# Patient Record
Sex: MALE | Race: ASIAN | NOT HISPANIC OR LATINO | URBAN - METROPOLITAN AREA
[De-identification: names, ages, dates, MRNs, and addresses within clinical notes are randomized per-mention and may not be internally consistent; named-entity substitution may affect disease eponyms.]

---

## 2017-12-26 PROBLEM — Z00.00 ENCOUNTER FOR PREVENTIVE HEALTH EXAMINATION: Status: ACTIVE | Noted: 2017-12-26

## 2018-06-18 ENCOUNTER — INPATIENT (INPATIENT)
Facility: HOSPITAL | Age: 78
LOS: 3 days | Discharge: HOME | End: 2018-06-22
Attending: LEGAL MEDICINE | Admitting: LEGAL MEDICINE
Payer: MEDICARE

## 2018-06-18 VITALS
DIASTOLIC BLOOD PRESSURE: 77 MMHG | OXYGEN SATURATION: 98 % | RESPIRATION RATE: 18 BRPM | TEMPERATURE: 98 F | HEART RATE: 77 BPM | SYSTOLIC BLOOD PRESSURE: 148 MMHG

## 2018-06-18 DIAGNOSIS — I10 ESSENTIAL (PRIMARY) HYPERTENSION: ICD-10-CM

## 2018-06-18 DIAGNOSIS — E11.59 TYPE 2 DIABETES MELLITUS WITH OTHER CIRCULATORY COMPLICATIONS: ICD-10-CM

## 2018-06-18 DIAGNOSIS — N17.9 ACUTE KIDNEY FAILURE, UNSPECIFIED: ICD-10-CM

## 2018-06-18 DIAGNOSIS — E87.6 HYPOKALEMIA: ICD-10-CM

## 2018-06-18 DIAGNOSIS — I25.10 ATHEROSCLEROTIC HEART DISEASE OF NATIVE CORONARY ARTERY WITHOUT ANGINA PECTORIS: ICD-10-CM

## 2018-06-18 DIAGNOSIS — Z29.9 ENCOUNTER FOR PROPHYLACTIC MEASURES, UNSPECIFIED: ICD-10-CM

## 2018-06-18 DIAGNOSIS — M25.551 PAIN IN RIGHT HIP: ICD-10-CM

## 2018-06-18 DIAGNOSIS — E78.5 HYPERLIPIDEMIA, UNSPECIFIED: ICD-10-CM

## 2018-06-18 LAB
BASOPHILS # BLD AUTO: 0.03 K/UL — SIGNIFICANT CHANGE UP (ref 0–0.2)
BASOPHILS NFR BLD AUTO: 0.4 % — SIGNIFICANT CHANGE UP (ref 0–2)
BUN SERPL-MCNC: 16 MG/DL — SIGNIFICANT CHANGE UP (ref 7–23)
CALCIUM SERPL-MCNC: 8.5 MG/DL — SIGNIFICANT CHANGE UP (ref 8.4–10.5)
CHLORIDE SERPL-SCNC: 98 MMOL/L — SIGNIFICANT CHANGE UP (ref 98–107)
CO2 SERPL-SCNC: 27 MMOL/L — SIGNIFICANT CHANGE UP (ref 22–31)
CREAT SERPL-MCNC: 1.55 MG/DL — HIGH (ref 0.5–1.3)
EOSINOPHIL # BLD AUTO: 0.11 K/UL — SIGNIFICANT CHANGE UP (ref 0–0.5)
EOSINOPHIL NFR BLD AUTO: 1.3 % — SIGNIFICANT CHANGE UP (ref 0–6)
GLUCOSE SERPL-MCNC: 185 MG/DL — HIGH (ref 70–99)
HCT VFR BLD CALC: 46.4 % — SIGNIFICANT CHANGE UP (ref 39–50)
HGB BLD-MCNC: 16.1 G/DL — SIGNIFICANT CHANGE UP (ref 13–17)
IMM GRANULOCYTES # BLD AUTO: 0.03 # — SIGNIFICANT CHANGE UP
IMM GRANULOCYTES NFR BLD AUTO: 0.4 % — SIGNIFICANT CHANGE UP (ref 0–1.5)
LYMPHOCYTES # BLD AUTO: 1.39 K/UL — SIGNIFICANT CHANGE UP (ref 1–3.3)
LYMPHOCYTES # BLD AUTO: 16.3 % — SIGNIFICANT CHANGE UP (ref 13–44)
MCHC RBC-ENTMCNC: 29.6 PG — SIGNIFICANT CHANGE UP (ref 27–34)
MCHC RBC-ENTMCNC: 34.7 % — SIGNIFICANT CHANGE UP (ref 32–36)
MCV RBC AUTO: 85.3 FL — SIGNIFICANT CHANGE UP (ref 80–100)
MONOCYTES # BLD AUTO: 0.98 K/UL — HIGH (ref 0–0.9)
MONOCYTES NFR BLD AUTO: 11.5 % — SIGNIFICANT CHANGE UP (ref 2–14)
NEUTROPHILS # BLD AUTO: 6 K/UL — SIGNIFICANT CHANGE UP (ref 1.8–7.4)
NEUTROPHILS NFR BLD AUTO: 70.1 % — SIGNIFICANT CHANGE UP (ref 43–77)
NRBC # FLD: 0 — SIGNIFICANT CHANGE UP
PLATELET # BLD AUTO: 227 K/UL — SIGNIFICANT CHANGE UP (ref 150–400)
PMV BLD: 10.9 FL — SIGNIFICANT CHANGE UP (ref 7–13)
POTASSIUM SERPL-MCNC: 3.3 MMOL/L — LOW (ref 3.5–5.3)
POTASSIUM SERPL-SCNC: 3.3 MMOL/L — LOW (ref 3.5–5.3)
RBC # BLD: 5.44 M/UL — SIGNIFICANT CHANGE UP (ref 4.2–5.8)
RBC # FLD: 12.6 % — SIGNIFICANT CHANGE UP (ref 10.3–14.5)
SODIUM SERPL-SCNC: 139 MMOL/L — SIGNIFICANT CHANGE UP (ref 135–145)
WBC # BLD: 8.54 K/UL — SIGNIFICANT CHANGE UP (ref 3.8–10.5)
WBC # FLD AUTO: 8.54 K/UL — SIGNIFICANT CHANGE UP (ref 3.8–10.5)

## 2018-06-18 PROCEDURE — 99223 1ST HOSP IP/OBS HIGH 75: CPT

## 2018-06-18 PROCEDURE — 73522 X-RAY EXAM HIPS BI 3-4 VIEWS: CPT | Mod: 26

## 2018-06-18 RX ORDER — AMLODIPINE BESYLATE 2.5 MG/1
10 TABLET ORAL DAILY
Qty: 0 | Refills: 0 | Status: DISCONTINUED | OUTPATIENT
Start: 2018-06-18 | End: 2018-06-22

## 2018-06-18 RX ORDER — LOSARTAN POTASSIUM 100 MG/1
100 TABLET, FILM COATED ORAL DAILY
Qty: 0 | Refills: 0 | Status: DISCONTINUED | OUTPATIENT
Start: 2018-06-18 | End: 2018-06-22

## 2018-06-18 RX ORDER — LIDOCAINE 4 G/100G
1 CREAM TOPICAL ONCE
Qty: 0 | Refills: 0 | Status: COMPLETED | OUTPATIENT
Start: 2018-06-18 | End: 2018-06-18

## 2018-06-18 RX ORDER — ACETAMINOPHEN 500 MG
650 TABLET ORAL ONCE
Qty: 0 | Refills: 0 | Status: COMPLETED | OUTPATIENT
Start: 2018-06-18 | End: 2018-06-18

## 2018-06-18 RX ORDER — DEXTROSE 50 % IN WATER 50 %
15 SYRINGE (ML) INTRAVENOUS ONCE
Qty: 0 | Refills: 0 | Status: DISCONTINUED | OUTPATIENT
Start: 2018-06-18 | End: 2018-06-22

## 2018-06-18 RX ORDER — LOSARTAN POTASSIUM 100 MG/1
100 TABLET, FILM COATED ORAL DAILY
Qty: 0 | Refills: 0 | Status: DISCONTINUED | OUTPATIENT
Start: 2018-06-18 | End: 2018-06-18

## 2018-06-18 RX ORDER — DEXTROSE 50 % IN WATER 50 %
25 SYRINGE (ML) INTRAVENOUS ONCE
Qty: 0 | Refills: 0 | Status: DISCONTINUED | OUTPATIENT
Start: 2018-06-18 | End: 2018-06-22

## 2018-06-18 RX ORDER — METOPROLOL TARTRATE 50 MG
50 TABLET ORAL DAILY
Qty: 0 | Refills: 0 | Status: DISCONTINUED | OUTPATIENT
Start: 2018-06-18 | End: 2018-06-22

## 2018-06-18 RX ORDER — METOPROLOL TARTRATE 50 MG
50 TABLET ORAL DAILY
Qty: 0 | Refills: 0 | Status: DISCONTINUED | OUTPATIENT
Start: 2018-06-18 | End: 2018-06-18

## 2018-06-18 RX ORDER — POTASSIUM CHLORIDE 20 MEQ
40 PACKET (EA) ORAL ONCE
Qty: 0 | Refills: 0 | Status: COMPLETED | OUTPATIENT
Start: 2018-06-18 | End: 2018-06-18

## 2018-06-18 RX ORDER — ACETAMINOPHEN 500 MG
650 TABLET ORAL EVERY 6 HOURS
Qty: 0 | Refills: 0 | Status: DISCONTINUED | OUTPATIENT
Start: 2018-06-18 | End: 2018-06-22

## 2018-06-18 RX ORDER — GLUCAGON INJECTION, SOLUTION 0.5 MG/.1ML
1 INJECTION, SOLUTION SUBCUTANEOUS ONCE
Qty: 0 | Refills: 0 | Status: DISCONTINUED | OUTPATIENT
Start: 2018-06-18 | End: 2018-06-22

## 2018-06-18 RX ORDER — ASPIRIN/CALCIUM CARB/MAGNESIUM 324 MG
81 TABLET ORAL DAILY
Qty: 0 | Refills: 0 | Status: DISCONTINUED | OUTPATIENT
Start: 2018-06-18 | End: 2018-06-22

## 2018-06-18 RX ORDER — SODIUM CHLORIDE 9 MG/ML
1000 INJECTION, SOLUTION INTRAVENOUS
Qty: 0 | Refills: 0 | Status: DISCONTINUED | OUTPATIENT
Start: 2018-06-18 | End: 2018-06-22

## 2018-06-18 RX ORDER — TRAMADOL HYDROCHLORIDE 50 MG/1
25 TABLET ORAL EVERY 8 HOURS
Qty: 0 | Refills: 0 | Status: DISCONTINUED | OUTPATIENT
Start: 2018-06-18 | End: 2018-06-22

## 2018-06-18 RX ORDER — DEXTROSE 50 % IN WATER 50 %
12.5 SYRINGE (ML) INTRAVENOUS ONCE
Qty: 0 | Refills: 0 | Status: DISCONTINUED | OUTPATIENT
Start: 2018-06-18 | End: 2018-06-22

## 2018-06-18 RX ORDER — GABAPENTIN 400 MG/1
100 CAPSULE ORAL EVERY 8 HOURS
Qty: 0 | Refills: 0 | Status: DISCONTINUED | OUTPATIENT
Start: 2018-06-18 | End: 2018-06-22

## 2018-06-18 RX ORDER — INSULIN LISPRO 100/ML
VIAL (ML) SUBCUTANEOUS
Qty: 0 | Refills: 0 | Status: DISCONTINUED | OUTPATIENT
Start: 2018-06-18 | End: 2018-06-22

## 2018-06-18 RX ORDER — ENOXAPARIN SODIUM 100 MG/ML
40 INJECTION SUBCUTANEOUS EVERY 24 HOURS
Qty: 0 | Refills: 0 | Status: DISCONTINUED | OUTPATIENT
Start: 2018-06-18 | End: 2018-06-22

## 2018-06-18 RX ADMIN — LIDOCAINE 1 PATCH: 4 CREAM TOPICAL at 22:39

## 2018-06-18 RX ADMIN — Medication 650 MILLIGRAM(S): at 09:30

## 2018-06-18 RX ADMIN — GABAPENTIN 100 MILLIGRAM(S): 400 CAPSULE ORAL at 22:36

## 2018-06-18 RX ADMIN — LIDOCAINE 1 PATCH: 4 CREAM TOPICAL at 09:30

## 2018-06-18 RX ADMIN — Medication 40 MILLIEQUIVALENT(S): at 13:33

## 2018-06-18 RX ADMIN — LIDOCAINE 1 PATCH: 4 CREAM TOPICAL at 09:49

## 2018-06-18 RX ADMIN — Medication 1: at 22:36

## 2018-06-18 NOTE — ED PROVIDER NOTE - ATTENDING CONTRIBUTION TO CARE
78 yr old male with hx of HTn, HLD, DM and cad with stent presents to ed c/o right posterior gluteal and hip pain chronically but worsening past 4 wks after having a stress test.  pt 2wks ago using walker to ambulate then past 4 days pain worsen to point where bearing weigh causes pain fro posterior hip down to leg.  no incontinence, no pain or discomfort when resting, no trauma, no injection to site, no fever, no chills, no rashes, no abd pain, no n/v, no dysuria.  feels weak on right hip.     *GEN:   comfortable, in no apparent distress, AOx3  *EYES:   PERRL, extra-occular movements intact  *HEENT:   airway patent, moist mucosal membranes, uvula midline  *CV:   regular rate and rhythm, normal S1/S2, no murmur, bilateral equal femoral pulses no mass or thrill.  2+ bilateral DP, equal temp  *RESP:   clear to auscultation bilaterally, non-labored, speaking in full sentences  *ABD:   soft, non tender, no guarding  *:   no cva tenderness  *MSK:   no musculoskeletal tenderness, 5/5 strength except at RLE +4/5 at rest, moving all extremity, when ambulating visible right foot drag, no point tenderness,   *SKIN:   dry, intact, no rash  *NEURO:   AOx3, no focal weakness or loss of sensation, GCS 15    pt with RLE weakness without any neurovascular compromise very likely strain vs partial tear vs piriformis syndrome vs deconditioning.  xr of hip/pelvis, lidoderm, f/u with ortho

## 2018-06-18 NOTE — H&P ADULT - NSHPPHYSICALEXAM_GEN_ALL_CORE
Vital Signs Last 24 Hrs  T(C): 36.7 (18 Jun 2018 14:10), Max: 36.8 (18 Jun 2018 08:44)  T(F): 98 (18 Jun 2018 14:10), Max: 98.3 (18 Jun 2018 08:44)  HR: 72 (18 Jun 2018 14:10) (72 - 77)  BP: 148/86 (18 Jun 2018 14:10) (148/77 - 148/86)  BP(mean): --  RR: 17 (18 Jun 2018 14:10) (17 - 18)  SpO2: 99% (18 Jun 2018 14:10) (98% - 99%)    GENERAL: NAD, well-developed  HEAD:  Atraumatic, Normocephalic  NECK: Supple, No JVD  CHEST/LUNG: Clear to auscultation bilaterally; No wheeze  HEART: Regular rate and rhythm; No murmurs, rubs, or gallops  ABDOMEN: Soft, Nontender, Nondistended; Bowel sounds present  EXTREMITIES:  2+ Peripheral Pulses, No clubbing, cyanosis, or edema  PSYCH: AAOx3  NEUROLOGY: Mild RLE proximal > distal weakness 4/5 compared to left  MSK: ROM in right hip mildly limited by pain   SKIN: No rashes or lesions

## 2018-06-18 NOTE — H&P ADULT - PROBLEM SELECTOR PLAN 3
S/p JOSH with recent stress test  - C/w ASA  - Hold statin for now in case of statin induced myalgias or myopathy; can re-start if no improvement off of statin   - C/w ARB and BB

## 2018-06-18 NOTE — H&P ADULT - NSHPLABSRESULTS_GEN_ALL_CORE
LABS:                      16.1   8.54  )-----------( 227      ( 18 Jun 2018 11:20 )             46.4     06-18    139  |  98  |  16  ----------------------------<  185<H>  3.3<L>   |  27  |  1.55<H>    Ca    8.5      18 Jun 2018 11:20    RADIOLOGY: Personally Reviewed  Right hip x-ray: No hip fracture or dislocation

## 2018-06-18 NOTE — H&P ADULT - PROBLEM SELECTOR PLAN 6
- Check A1C  - Hold oral hypoglycemic agents while hospitalized  - Monitor FS's on SS  - If FS's uncontrolled, will consider basal/bolus insulin

## 2018-06-18 NOTE — ED ADULT NURSE NOTE - OBJECTIVE STATEMENT
Received pt. in Intake 4 presenting to the ER complaining of inability to ambulate with pain in right hip and right leg. Patient have a history of DM, CAD with stent, High cholesterol, HTN. Patient stated " my right leg and hip have pain for 4 weeks and it is getting worse". Patient is alert and oriented x 4, vss, able to move right leg but unable to ambulate, labs sent will continue to monitor.

## 2018-06-18 NOTE — H&P ADULT - ASSESSMENT
78 yr old male with hx of HTN, HLD, DM 2 and CAD s/p JOSH who presents to ED who c/o chronic right posterior gluteal and hip pain worsening for the past 4 wks after having a stress test.

## 2018-06-18 NOTE — H&P ADULT - PMH
Diabetes mellitus type II    Essential hypertension  Hypertension  GERD (gastroesophageal reflux disease)    HTN - Hypertension    Hypercholesterolemia  Hypercholesteremia  Hyperlipidemia    Type 2 diabetes mellitus  Diabetes

## 2018-06-18 NOTE — H&P ADULT - HISTORY OF PRESENT ILLNESS
This is an  Patient was able to use walker to ambulate 2 weeks ago then in the past 4 days his pain worsened to the point where bearing weigh causes pain from posterior hip down to leg. He describes pain as aching, localized to hip and thighs, bilateral LE's, right worse than left, precipitated by doing treadmill stress test, better with rest, worse with ambulation, and associated with some right foot drop. He has no incontinence, no pain or discomfort when resting, no trauma, no injection to site, no fever, no chills, no rashes, no abd pain, no n/v, no dysuria. In the ED, patient HDS and afebrile. He was admitted to medicine for pain control and rehab evaluation.

## 2018-06-18 NOTE — H&P ADULT - PROBLEM SELECTOR PLAN 7
SCr 1.55; unlikely to be SUDHIR  - Would obtain outpatient records to confirm stability of SCr  - Can c/w ARB for now   - Trend SCr daily

## 2018-06-18 NOTE — ED PROVIDER NOTE - OBJECTIVE STATEMENT
78 yr old male with hx of HTn, HLD, DM and cad with stent presents to ed c/o right posterior gluteal and hip pain chronically but worsening past 4 wks after having a stress test.  pt 2wks ago using walker to ambulate then past 4 days pain worsen to point where bearing weigh causes pain fro posterior hip down to leg.  no incontinence, no pain or discomfort when resting, no trauma, no injection to site, no fever, no chills, no rashes, no abd pain, no n/v, no dysuria.  feels weak on right hip.

## 2018-06-18 NOTE — ED PROVIDER NOTE - PHYSICAL EXAMINATION
*GEN:   comfortable, in no apparent distress, AOx3  *EYES:   PERRL, extra-occular movements intact  *HEENT:   airway patent, moist mucosal membranes, uvula midline  *CV:   regular rate and rhythm, normal S1/S2, no murmur, bilateral equal femoral pulses no mass or thrill.  2+ bilateral DP, equal temp  *RESP:   clear to auscultation bilaterally, non-labored, speaking in full sentences  *ABD:   soft, non tender, no guarding  *:   no cva tenderness  *MSK:   no musculoskeletal tenderness, 5/5 strength except at RLE +4/5 at rest, moving all extremity, when ambulating visible right foot drag, no point tenderness,   *SKIN:   dry, intact, no rash  *NEURO:   AOx3, no focal weakness or loss of sensation, GCS 15

## 2018-06-18 NOTE — ED PROVIDER NOTE - MEDICAL DECISION MAKING DETAILS
pt with RLE weakness without any neurovascular compromise very likely strain vs partial tear vs piriformis syndrome vs deconditioning.  xr of hip/pelvis, lidoderm, f/u with ortho.

## 2018-06-18 NOTE — H&P ADULT - PROBLEM SELECTOR PLAN 1
R > L, appears to be acute on chronic with worsening after stress test 2 weeks ago. No clear injury noted on exam and without any signs of cord compression   - X-ray of right hip negative   - PMR consult placed; will await recs  - PT/OT  - Pain control: start gabapentin 100mg TID along with tramadol prn for pain  - Hold statin in case of statin induced myopathy although low suspicion   - Check CPK levels in AM  - Will hold off on MRI hip for now given patient clinically in no distress. Await PMR recs for further imaging R > L, appears to be acute on chronic with worsening after stress test 2 weeks ago. No clear injury noted on exam and without any signs of cord compression. DDx includes hip strain vs. neuropathy (less likely sciatica) vs. myopathy etc.  - X-ray of right hip negative   - PMR consult placed; will await recs  - PT/OT  - Pain control: start gabapentin 100mg TID along with tramadol prn for pain  - Hold statin in case of statin induced myopathy although low suspicion   - Check CPK levels in AM  - Will hold off on MRI hip for now given patient clinically in no distress. Await PMR recs for further imaging

## 2018-06-18 NOTE — H&P ADULT - FAMILY HISTORY
Father  Still living? Unknown  Family history of diabetes mellitus, Age at diagnosis: Age Unknown  Family history of cardiovascular disease, Age at diagnosis: Age Unknown     Mother  Still living? Unknown  Family history of diabetes mellitus, Age at diagnosis: Age Unknown  Family history of cardiovascular disease, Age at diagnosis: Age Unknown     Sibling  Still living? Unknown  Family history of diabetes mellitus, Age at diagnosis: Age Unknown  Family history of cardiovascular disease, Age at diagnosis: Age Unknown     Child  Still living? Unknown  Family history of diabetes mellitus, Age at diagnosis: Age Unknown  Family history of cardiovascular disease, Age at diagnosis: Age Unknown     Grandparent  Still living? Unknown  Family history of diabetes mellitus, Age at diagnosis: Age Unknown  Family history of cardiovascular disease, Age at diagnosis: Age Unknown     Aunt  Still living? Unknown  Family history of diabetes mellitus, Age at diagnosis: Age Unknown  Family history of cardiovascular disease, Age at diagnosis: Age Unknown

## 2018-06-18 NOTE — ED ADULT NURSE NOTE - CHPI ED SYMPTOMS NEG
no dizziness/no decreased eating/drinking/no fever/no chills/no weakness/no vomiting/no tingling/no numbness/no nausea

## 2018-06-19 LAB
BUN SERPL-MCNC: 15 MG/DL — SIGNIFICANT CHANGE UP (ref 7–23)
CALCIUM SERPL-MCNC: 8.6 MG/DL — SIGNIFICANT CHANGE UP (ref 8.4–10.5)
CHLORIDE SERPL-SCNC: 99 MMOL/L — SIGNIFICANT CHANGE UP (ref 98–107)
CHOLEST SERPL-MCNC: 198 MG/DL — SIGNIFICANT CHANGE UP (ref 120–199)
CK SERPL-CCNC: 130 U/L — SIGNIFICANT CHANGE UP (ref 30–200)
CO2 SERPL-SCNC: 29 MMOL/L — SIGNIFICANT CHANGE UP (ref 22–31)
CREAT SERPL-MCNC: 1.42 MG/DL — HIGH (ref 0.5–1.3)
GLUCOSE BLDC GLUCOMTR-MCNC: 101 MG/DL — HIGH (ref 70–99)
GLUCOSE BLDC GLUCOMTR-MCNC: 153 MG/DL — HIGH (ref 70–99)
GLUCOSE BLDC GLUCOMTR-MCNC: 163 MG/DL — HIGH (ref 70–99)
GLUCOSE SERPL-MCNC: 108 MG/DL — HIGH (ref 70–99)
HBA1C BLD-MCNC: 7.9 % — HIGH (ref 4–5.6)
HCT VFR BLD CALC: 51.4 % — HIGH (ref 39–50)
HDLC SERPL-MCNC: 42 MG/DL — SIGNIFICANT CHANGE UP (ref 35–55)
HGB BLD-MCNC: 16.9 G/DL — SIGNIFICANT CHANGE UP (ref 13–17)
LIPID PNL WITH DIRECT LDL SERPL: 135 MG/DL — SIGNIFICANT CHANGE UP
MCHC RBC-ENTMCNC: 29.7 PG — SIGNIFICANT CHANGE UP (ref 27–34)
MCHC RBC-ENTMCNC: 32.9 % — SIGNIFICANT CHANGE UP (ref 32–36)
MCV RBC AUTO: 89.3 FL — SIGNIFICANT CHANGE UP (ref 80–100)
NRBC # FLD: 0 — SIGNIFICANT CHANGE UP
PLATELET # BLD AUTO: 163 K/UL — SIGNIFICANT CHANGE UP (ref 150–400)
PMV BLD: 11.4 FL — SIGNIFICANT CHANGE UP (ref 7–13)
POTASSIUM SERPL-MCNC: 3.9 MMOL/L — SIGNIFICANT CHANGE UP (ref 3.5–5.3)
POTASSIUM SERPL-SCNC: 3.9 MMOL/L — SIGNIFICANT CHANGE UP (ref 3.5–5.3)
RBC # BLD: 5.69 M/UL — SIGNIFICANT CHANGE UP (ref 4.2–5.8)
RBC # FLD: 12.7 % — SIGNIFICANT CHANGE UP (ref 10.3–14.5)
SODIUM SERPL-SCNC: 141 MMOL/L — SIGNIFICANT CHANGE UP (ref 135–145)
TRIGL SERPL-MCNC: 165 MG/DL — HIGH (ref 10–149)
TSH SERPL-MCNC: 8.72 UIU/ML — HIGH (ref 0.27–4.2)
WBC # BLD: 8.65 K/UL — SIGNIFICANT CHANGE UP (ref 3.8–10.5)
WBC # FLD AUTO: 8.65 K/UL — SIGNIFICANT CHANGE UP (ref 3.8–10.5)

## 2018-06-19 PROCEDURE — 99222 1ST HOSP IP/OBS MODERATE 55: CPT | Mod: GC

## 2018-06-19 RX ADMIN — GABAPENTIN 100 MILLIGRAM(S): 400 CAPSULE ORAL at 21:54

## 2018-06-19 RX ADMIN — LOSARTAN POTASSIUM 100 MILLIGRAM(S): 100 TABLET, FILM COATED ORAL at 06:24

## 2018-06-19 RX ADMIN — GABAPENTIN 100 MILLIGRAM(S): 400 CAPSULE ORAL at 13:39

## 2018-06-19 RX ADMIN — AMLODIPINE BESYLATE 10 MILLIGRAM(S): 2.5 TABLET ORAL at 06:23

## 2018-06-19 RX ADMIN — GABAPENTIN 100 MILLIGRAM(S): 400 CAPSULE ORAL at 06:24

## 2018-06-19 RX ADMIN — Medication 81 MILLIGRAM(S): at 13:39

## 2018-06-19 RX ADMIN — Medication 50 MILLIGRAM(S): at 06:23

## 2018-06-19 RX ADMIN — Medication 1: at 12:16

## 2018-06-19 RX ADMIN — ENOXAPARIN SODIUM 40 MILLIGRAM(S): 100 INJECTION SUBCUTANEOUS at 06:23

## 2018-06-19 NOTE — PHYSICAL THERAPY INITIAL EVALUATION ADULT - GENERAL OBSERVATIONS, REHAB EVAL
Consult received, chart reviewed. Patient received supine in bed, NAD, family present. Patient agreed to Evaluation from Physical Therapist.

## 2018-06-19 NOTE — PHYSICAL THERAPY INITIAL EVALUATION ADULT - PERTINENT HX OF CURRENT PROBLEM, REHAB EVAL
Pt. admitted for right hip pain. Per documentation, right hip x-ray: No hip fracture or dislocation.

## 2018-06-19 NOTE — PHYSICAL THERAPY INITIAL EVALUATION ADULT - ADDITIONAL COMMENTS
Pt. reports owning DME of straight cane.     Pt. was left supine in bed post PT Evaluation, NAD, family present. RN aware of pt. participation in PT

## 2018-06-19 NOTE — PHYSICAL THERAPY INITIAL EVALUATION ADULT - CRITERIA FOR SKILLED THERAPEUTIC INTERVENTIONS
risk reduction/prevention/anticipated discharge recommendation/impairments found/anticipated equipment needs at discharge/rehab potential/predicted duration of therapy intervention/therapy frequency

## 2018-06-19 NOTE — CHART NOTE - NSCHARTNOTEFT_GEN_A_CORE
PM & R recs: MRI and Vascular consult :- this work-up can be done on an outpatient basis.:d/w Dr. Yasmani sequeira to work up as an outpt

## 2018-06-19 NOTE — CONSULT NOTE ADULT - SUBJECTIVE AND OBJECTIVE BOX
HPI:  This 79yo male was able to use walker (?cane) to ambulate 2 weeks ago; in the 4 days PTA 6/18/18 his pain worsened to the point where weight bearing causes pain from posterior hip down to leg. He describes pain as aching, localized to hip and thighs, bilateral LE's, right worse than left, precipitated by doing treadmill stress test, better with rest, worse with ambulation, and associated with some right foot drop. He has no incontinence, no pain or discomfort when resting, no trauma, no injection to site, no fever, no chills, no rashes, no abd pain, no n/v, no dysuria. In the ED, patient HDS and afebrile. He was admitted to medicine for pain control and rehab evaluation. (18 Jun 2018 15:58)    Interval: acute on chronic bilateral hip pain exacerbated by stress test several weeks ago. Not rushing to perform hip MRI. Statin on hold in case aetiology of pain is statin related myopathy. CK WNL.    XR hips bilateral 6/18/18:  IMPRESSION:  No acute bone or joint disease.    REVIEW OF SYSTEMS: No chest pain, shortness of breath, nausea, vomiting or diarrhea      PAST MEDICAL & SURGICAL HISTORY  Essential hypertension  Type 2 diabetes mellitus  Hypercholesterolemia  GERD (gastroesophageal reflux disease)  Hyperlipidemia  Other postprocedural status  H/O colonoscopy    SOCIAL HISTORY  Smoking - Denied, EtOH - Denied, Drugs - Denied    FUNCTIONAL HISTORY:   Lives in a house with wife and daughter, 2 TIARA   Independent PTA with AD (straight cane)    CURRENT FUNCTIONAL STATUS:  bed mobility independent  transfers independent  gait CGA/supervision 50' RW    FAMILY HISTORY   Family history of cardiovascular disease (Father, Mother, Sibling, Child, Grandparent, Aunt)  Family history of diabetes mellitus (Father, Mother, Sibling, Child, Grandparent, Aunt)    RECENT LABS/IMAGING  CBC Full  -  ( 19 Jun 2018 05:10 )  WBC Count : 8.65 K/uL  Hemoglobin : 16.9 g/dL  Hematocrit : 51.4 %  Platelet Count - Automated : 163 K/uL  Mean Cell Volume : 89.3 fL  Mean Cell Hemoglobin : 29.7 pg  Mean Cell Hemoglobin Concentration : 32.9 %  Auto Neutrophil # : x  Auto Lymphocyte # : x  Auto Monocyte # : x  Auto Eosinophil # : x  Auto Basophil # : x  Auto Neutrophil % : x  Auto Lymphocyte % : x  Auto Monocyte % : x  Auto Eosinophil % : x  Auto Basophil % : x    06-19    141  |  99  |  15  ----------------------------<  108<H>  3.9   |  29  |  1.42<H>    Ca    8.6      19 Jun 2018 05:10    VITALS  T(C): 36.4 (06-19-18 @ 05:19), Max: 36.8 (06-18-18 @ 21:35)  HR: 69 (06-19-18 @ 05:19) (63 - 72)  BP: 149/84 (06-19-18 @ 05:19) (146/74 - 152/79)  RR: 18 (06-19-18 @ 05:19) (17 - 18)  SpO2: 98% (06-19-18 @ 05:19) (95% - 99%)  Wt(kg): --    ALLERGIES  No Known Allergies    MEDICATIONS   acetaminophen   Tablet. 650 milliGRAM(s) Oral every 6 hours PRN  amLODIPine   Tablet 10 milliGRAM(s) Oral daily  aspirin enteric coated 81 milliGRAM(s) Oral daily  dextrose 40% Gel 15 Gram(s) Oral once PRN  dextrose 5%. 1000 milliLiter(s) IV Continuous <Continuous>  dextrose 50% Injectable 12.5 Gram(s) IV Push once  dextrose 50% Injectable 25 Gram(s) IV Push once  dextrose 50% Injectable 25 Gram(s) IV Push once  enoxaparin Injectable 40 milliGRAM(s) SubCutaneous every 24 hours  gabapentin 100 milliGRAM(s) Oral every 8 hours  glucagon  Injectable 1 milliGRAM(s) IntraMuscular once PRN  insulin lispro (HumaLOG) corrective regimen sliding scale   SubCutaneous three times a day before meals  losartan 100 milliGRAM(s) Oral daily  metoprolol succinate ER 50 milliGRAM(s) Oral daily  traMADol 25 milliGRAM(s) Oral every 8 hours PRN    ----------------------------------------------------------------------------------------  PHYSICAL EXAM  Constitutional - NAD, Comfortable  HEENT - NCAT, EOMI  Neck - Supple, No limited ROM  Chest - CTA bilaterally, No wheeze, No rhonchi, No crackles  Cardiovascular - RRR, S1S2, No murmurs  Abdomen - BS+, Soft, NTND  Extremities - No C/C/E, No calf tenderness   Neurologic Exam -                    Cognitive - Awake, Alert, AAO to self, place, date, year, situation     Communication - Fluent, No dysarthria, no aphasia     Cranial Nerves - CN 2-12 intact     Motor - No focal deficits                       Sensory - Intact to LT     Reflexes - DTR Intact, No primitive reflexive     Balance - WNL Static  Psychiatric - Mood stable, Affect WNL HPI:  This 77yo male was able to ambulate 2 weeks ago; in the 4 days PTA 6/18/18 his pain worsened to the point where weight bearing causes pain from posterior hip down to leg. He describes pain as aching, localized to hip and thighs, bilateral LE's, right worse than left, precipitated by doing treadmill stress test, better with rest, worse with ambulation, and associated with some right foot drop. He has no incontinence, no pain or discomfort when resting, no trauma, no injection to site, no fever, no chills, no rashes, no abd pain, no n/v, no dysuria. In the ED, patient HDS and afebrile. He was admitted to medicine for pain control and rehab evaluation. (18 Jun 2018 15:58)    Interval: acute on chronic bilateral hip pain exacerbated by stress test several weeks ago. Not rushing to perform hip MRI. Statin on hold in case aetiology of pain is statin related myopathy. CK WNL.    XR hips bilateral 6/18/18:  IMPRESSION:  No acute bone or joint disease.    REVIEW OF SYSTEMS: No chest pain, shortness of breath, nausea, vomiting or diarrhea    Patient endorses b/l R>LLE pain, especially right medial thigh and posterior calf, sore + aching in nature, exacerbated with weight bearing + walking.   Patient denies back pain.     PAST MEDICAL & SURGICAL HISTORY  Essential hypertension  Type 2 diabetes mellitus  Hypercholesterolemia  GERD (gastroesophageal reflux disease)  Hyperlipidemia  Other postprocedural status  H/O colonoscopy    SOCIAL HISTORY  Smoking - Denied, EtOH - Denied, Drugs - Denied    FUNCTIONAL HISTORY:   [address]   Independent PTA with no AD as per daughter     CURRENT FUNCTIONAL STATUS:  bed mobility independent  transfers independent  gait CGA/supervision 50' RW    FAMILY HISTORY   Family history of cardiovascular disease (Father, Mother, Sibling, Child, Grandparent, Aunt)  Family history of diabetes mellitus (Father, Mother, Sibling, Child, Grandparent, Aunt)    RECENT LABS/IMAGING  CBC Full  -  ( 19 Jun 2018 05:10 )  WBC Count : 8.65 K/uL  Hemoglobin : 16.9 g/dL  Hematocrit : 51.4 %  Platelet Count - Automated : 163 K/uL  Mean Cell Volume : 89.3 fL  Mean Cell Hemoglobin : 29.7 pg  Mean Cell Hemoglobin Concentration : 32.9 %  Auto Neutrophil # : x  Auto Lymphocyte # : x  Auto Monocyte # : x  Auto Eosinophil # : x  Auto Basophil # : x  Auto Neutrophil % : x  Auto Lymphocyte % : x  Auto Monocyte % : x  Auto Eosinophil % : x  Auto Basophil % : x    06-19    141  |  99  |  15  ----------------------------<  108<H>  3.9   |  29  |  1.42<H>    Ca    8.6      19 Jun 2018 05:10    VITALS  T(C): 36.4 (06-19-18 @ 05:19), Max: 36.8 (06-18-18 @ 21:35)  HR: 69 (06-19-18 @ 05:19) (63 - 72)  BP: 149/84 (06-19-18 @ 05:19) (146/74 - 152/79)  RR: 18 (06-19-18 @ 05:19) (17 - 18)  SpO2: 98% (06-19-18 @ 05:19) (95% - 99%)  Wt(kg): --    ALLERGIES  No Known Allergies    MEDICATIONS   acetaminophen   Tablet. 650 milliGRAM(s) Oral every 6 hours PRN  amLODIPine   Tablet 10 milliGRAM(s) Oral daily  aspirin enteric coated 81 milliGRAM(s) Oral daily  dextrose 40% Gel 15 Gram(s) Oral once PRN  dextrose 5%. 1000 milliLiter(s) IV Continuous <Continuous>  dextrose 50% Injectable 12.5 Gram(s) IV Push once  dextrose 50% Injectable 25 Gram(s) IV Push once  dextrose 50% Injectable 25 Gram(s) IV Push once  enoxaparin Injectable 40 milliGRAM(s) SubCutaneous every 24 hours  gabapentin 100 milliGRAM(s) Oral every 8 hours  glucagon  Injectable 1 milliGRAM(s) IntraMuscular once PRN  insulin lispro (HumaLOG) corrective regimen sliding scale   SubCutaneous three times a day before meals  losartan 100 milliGRAM(s) Oral daily  metoprolol succinate ER 50 milliGRAM(s) Oral daily  traMADol 25 milliGRAM(s) Oral every 8 hours PRN    ----------------------------------------------------------------------------------------  PHYSICAL EXAM  Constitutional - NAD, Comfortable  HEENT - NCAT, EOMI  Neck - Supple, No limited ROM  Chest - CTA bilaterally, No wheeze, No rhonchi, No crackles  Cardiovascular - RRR, S1S2, No murmurs  Abdomen - BS+, Soft, NTND  Extremities - No C/C/E, No calf tenderness. b/l LEs NTTP  Neurologic Exam -                    Cognitive - Awake, Alert, AAO to self, place, date, year, situation     Communication - Fluent, No dysarthria, no aphasia     Cranial Nerves - CN 2-12 intact     Motor -   5/5 MMT in b/ upper and lower extremities, however weak hip flexors when transferring from sit to stand   negative Audie test  negative MILKA test bilaterally.  no pain with SLR                  Sensory - Intact to LT     Reflexes - DTR Intact, No primitive reflexive     Balance - standing balance fair to poor  Psychiatric - Mood stable, Affect WNL    ASSESSMENT:    77yo male with RLE pain and 2 week h/o difficulty walking.     Patient with no focal motor deficits, no specific areas of LE tenderness, however patient endorses right medial thigh & right posterior calf pain. Special tests for hip pathology were unrevealing. He has independent bed mobility, and can rise from sit-stand with 1 person assist. He can ambulate with 1 person hand-held assist, with some unsteadiness and difficulty extending his knees in stance phase. Pain seems myalgic in nature, less so neuropathic.     Patient will benefit from continued bedside PT and OT to work on gait and ADLs. HPI:  This 77yo male was able to ambulate 2 weeks ago; in the 4 days PTA 6/18/18 his pain worsened to the point where weight bearing causes pain from posterior hip down to leg. He describes pain as aching, localized to hip and thighs, bilateral LE's, right worse than left, precipitated by doing treadmill stress test, better with rest, worse with ambulation, and associated with some right foot drop. He has no incontinence, no pain or discomfort when resting, no trauma, no injection to site, no fever, no chills, no rashes, no abd pain, no n/v, no dysuria. In the ED, patient HDS and afebrile. He was admitted to medicine for pain control and rehab evaluation. (18 Jun 2018 15:58)    Interval: acute on chronic bilateral hip pain exacerbated by stress test several weeks ago. Hip MRI not yet performed. Statin on hold in case aetiology of pain is statin related myopathy. CK WNL.    XR hips bilateral 6/18/18:  IMPRESSION:  No acute bone or joint disease.    REVIEW OF SYSTEMS: No chest pain, shortness of breath, nausea, vomiting or diarrhea    Patient endorses b/l R>LLE pain, especially right medial thigh and posterior calf, sore + aching in nature, exacerbated with weight bearing + walking.   Pain is better with forward flexion of the low back and exacerbated by extension.     PAST MEDICAL & SURGICAL HISTORY  Essential hypertension  Type 2 diabetes mellitus  Hypercholesterolemia  GERD (gastroesophageal reflux disease)  Hyperlipidemia  Other postprocedural status  H/O colonoscopy    SOCIAL HISTORY  Smoking - Denied, EtOH - Denied, Drugs - Denied    FUNCTIONAL HISTORY:   Lives in daughter's house in [address], [address] stairs inside   Independent PTA with no AD as per daughter     CURRENT FUNCTIONAL STATUS:  bed mobility independent  transfers independent  gait CGA/supervision 50' RW    FAMILY HISTORY   Family history of cardiovascular disease (Father, Mother, Sibling, Child, Grandparent, Aunt)  Family history of diabetes mellitus (Father, Mother, Sibling, Child, Grandparent, Aunt)    RECENT LABS/IMAGING  CBC Full  -  ( 19 Jun 2018 05:10 )  WBC Count : 8.65 K/uL  Hemoglobin : 16.9 g/dL  Hematocrit : 51.4 %  Platelet Count - Automated : 163 K/uL  Mean Cell Volume : 89.3 fL  Mean Cell Hemoglobin : 29.7 pg  Mean Cell Hemoglobin Concentration : 32.9 %  Auto Neutrophil # : x  Auto Lymphocyte # : x  Auto Monocyte # : x  Auto Eosinophil # : x  Auto Basophil # : x  Auto Neutrophil % : x  Auto Lymphocyte % : x  Auto Monocyte % : x  Auto Eosinophil % : x  Auto Basophil % : x    06-19    141  |  99  |  15  ----------------------------<  108<H>  3.9   |  29  |  1.42<H>    Ca    8.6      19 Jun 2018 05:10    VITALS  T(C): 36.4 (06-19-18 @ 05:19), Max: 36.8 (06-18-18 @ 21:35)  HR: 69 (06-19-18 @ 05:19) (63 - 72)  BP: 149/84 (06-19-18 @ 05:19) (146/74 - 152/79)  RR: 18 (06-19-18 @ 05:19) (17 - 18)  SpO2: 98% (06-19-18 @ 05:19) (95% - 99%)  Wt(kg): --    ALLERGIES  No Known Allergies    MEDICATIONS   acetaminophen   Tablet. 650 milliGRAM(s) Oral every 6 hours PRN  amLODIPine   Tablet 10 milliGRAM(s) Oral daily  aspirin enteric coated 81 milliGRAM(s) Oral daily  dextrose 40% Gel 15 Gram(s) Oral once PRN  dextrose 5%. 1000 milliLiter(s) IV Continuous <Continuous>  dextrose 50% Injectable 12.5 Gram(s) IV Push once  dextrose 50% Injectable 25 Gram(s) IV Push once  dextrose 50% Injectable 25 Gram(s) IV Push once  enoxaparin Injectable 40 milliGRAM(s) SubCutaneous every 24 hours  gabapentin 100 milliGRAM(s) Oral every 8 hours  glucagon  Injectable 1 milliGRAM(s) IntraMuscular once PRN  insulin lispro (HumaLOG) corrective regimen sliding scale   SubCutaneous three times a day before meals  losartan 100 milliGRAM(s) Oral daily  metoprolol succinate ER 50 milliGRAM(s) Oral daily  traMADol 25 milliGRAM(s) Oral every 8 hours PRN    ----------------------------------------------------------------------------------------  PHYSICAL EXAM  Constitutional - NAD, Comfortable  HEENT - NCAT, EOMI  Neck - Supple, No limited ROM  Chest - CTA bilaterally, No wheeze, No rhonchi, No crackles  Cardiovascular - RRR, S1S2, No murmurs  Abdomen - BS+, Soft, NTND  Extremities - No C/C/E, No calf tenderness. b/l LEs NTTP  Neurologic Exam -                    Cognitive - Awake, Alert, AAO to self, place, date, year, situation     Communication - Fluent, No dysarthria, no aphasia     Cranial Nerves - CN 2-12 intact     Motor -   5/5 MMT in b/l upper and lower extremities  negative Audie test  negative MILKA test bilaterally.  no pain with SLR                  Sensory - Intact to LT     Reflexes - DTR Intact, No primitive reflexive     Balance - standing balance fair. sit-stand transfer and walking with supervision/CGA  Psychiatric - Mood stable, Affect WNL    ASSESSMENT:    77yo male with right & left LE pain (R>>L) and 2 week h/o gait impairment.     Pain does not seem neuropathic in nature. Pain origin does not seem to be in the hips, in fact patient endorses pain in medial thigh region and posterior calves, same distribution bilaterally, but pain severity R >> L. Pain worse with ambulation and back extension.    Recommend lumbar spine MRI (r/o spinal stenosis, although identical distribution of b/l LE pain is atypical) and vascular consult (?claudication); this work-up can be done on an outpatient basis.    Discharge home with assistive device (d/w [facility] PT, consider rolling walker) when medically optimized.    Thank you for allowing us to participate in the care of this patient.

## 2018-06-20 LAB
GLUCOSE BLDC GLUCOMTR-MCNC: 124 MG/DL — HIGH (ref 70–99)
GLUCOSE BLDC GLUCOMTR-MCNC: 138 MG/DL — HIGH (ref 70–99)
GLUCOSE BLDC GLUCOMTR-MCNC: 142 MG/DL — HIGH (ref 70–99)
GLUCOSE BLDC GLUCOMTR-MCNC: 143 MG/DL — HIGH (ref 70–99)

## 2018-06-20 PROCEDURE — 99222 1ST HOSP IP/OBS MODERATE 55: CPT

## 2018-06-20 PROCEDURE — 99232 SBSQ HOSP IP/OBS MODERATE 35: CPT | Mod: GC

## 2018-06-20 RX ADMIN — TRAMADOL HYDROCHLORIDE 25 MILLIGRAM(S): 50 TABLET ORAL at 18:12

## 2018-06-20 RX ADMIN — AMLODIPINE BESYLATE 10 MILLIGRAM(S): 2.5 TABLET ORAL at 05:52

## 2018-06-20 RX ADMIN — Medication 81 MILLIGRAM(S): at 13:13

## 2018-06-20 RX ADMIN — LOSARTAN POTASSIUM 100 MILLIGRAM(S): 100 TABLET, FILM COATED ORAL at 05:52

## 2018-06-20 RX ADMIN — ENOXAPARIN SODIUM 40 MILLIGRAM(S): 100 INJECTION SUBCUTANEOUS at 05:52

## 2018-06-20 RX ADMIN — GABAPENTIN 100 MILLIGRAM(S): 400 CAPSULE ORAL at 13:13

## 2018-06-20 RX ADMIN — Medication 50 MILLIGRAM(S): at 05:52

## 2018-06-20 RX ADMIN — TRAMADOL HYDROCHLORIDE 25 MILLIGRAM(S): 50 TABLET ORAL at 18:40

## 2018-06-20 RX ADMIN — GABAPENTIN 100 MILLIGRAM(S): 400 CAPSULE ORAL at 05:52

## 2018-06-20 RX ADMIN — GABAPENTIN 100 MILLIGRAM(S): 400 CAPSULE ORAL at 22:12

## 2018-06-20 NOTE — CHART NOTE - NSCHARTNOTEFT_GEN_A_CORE
-Recommend  by Dr. Patten PM&R lumbar spine MRI (r/o spinal stenosis, although identical distribution of b/l LE pain is atypical) and vascular consult (?claudication): advised these test could be done as an outpt.  Daughter states she is not able to get the MRI as an outpt.  D/w Medical attending ok to get MRI as an outpt.    Pt can follow up with Vascular as an outp.

## 2018-06-21 DIAGNOSIS — M51.26 OTHER INTERVERTEBRAL DISC DISPLACEMENT, LUMBAR REGION: ICD-10-CM

## 2018-06-21 LAB
GLUCOSE BLDC GLUCOMTR-MCNC: 129 MG/DL — HIGH (ref 70–99)
GLUCOSE BLDC GLUCOMTR-MCNC: 153 MG/DL — HIGH (ref 70–99)
GLUCOSE BLDC GLUCOMTR-MCNC: 163 MG/DL — HIGH (ref 70–99)
GLUCOSE BLDC GLUCOMTR-MCNC: 181 MG/DL — HIGH (ref 70–99)

## 2018-06-21 PROCEDURE — 99233 SBSQ HOSP IP/OBS HIGH 50: CPT

## 2018-06-21 PROCEDURE — 72148 MRI LUMBAR SPINE W/O DYE: CPT | Mod: 26

## 2018-06-21 RX ADMIN — Medication 24 MILLIGRAM(S): at 18:34

## 2018-06-21 RX ADMIN — Medication 650 MILLIGRAM(S): at 20:17

## 2018-06-21 RX ADMIN — Medication 81 MILLIGRAM(S): at 13:29

## 2018-06-21 RX ADMIN — ENOXAPARIN SODIUM 40 MILLIGRAM(S): 100 INJECTION SUBCUTANEOUS at 06:24

## 2018-06-21 RX ADMIN — Medication 1: at 17:46

## 2018-06-21 RX ADMIN — Medication 650 MILLIGRAM(S): at 19:52

## 2018-06-21 RX ADMIN — GABAPENTIN 100 MILLIGRAM(S): 400 CAPSULE ORAL at 13:28

## 2018-06-21 RX ADMIN — Medication 1: at 12:38

## 2018-06-21 RX ADMIN — GABAPENTIN 100 MILLIGRAM(S): 400 CAPSULE ORAL at 06:23

## 2018-06-21 RX ADMIN — GABAPENTIN 100 MILLIGRAM(S): 400 CAPSULE ORAL at 21:48

## 2018-06-21 RX ADMIN — LOSARTAN POTASSIUM 100 MILLIGRAM(S): 100 TABLET, FILM COATED ORAL at 06:24

## 2018-06-21 RX ADMIN — AMLODIPINE BESYLATE 10 MILLIGRAM(S): 2.5 TABLET ORAL at 06:24

## 2018-06-21 RX ADMIN — Medication 50 MILLIGRAM(S): at 06:24

## 2018-06-21 NOTE — CONSULT NOTE ADULT - ATTENDING COMMENTS
Patient seen and examined  Agree with above    Plan for initial trial of Medrol pack but explained high likelihood of need for lumbar laminectomy and discectomy to alleviate symptoms  Patient will see me in the office in 2 weeks to discuss response to steroids

## 2018-06-21 NOTE — PROGRESS NOTE ADULT - SUBJECTIVE AND OBJECTIVE BOX
HPI:  This 77yo male was able to ambulate 2 weeks ago; in the 4 days PTA 6/18/18 his pain worsened to the point where weight bearing causes pain from posterior hip down to leg. He describes pain as aching, localized to hip and thighs, bilateral LE's, right worse than left, precipitated by doing treadmill stress test, better with rest, worse with ambulation, and associated with some right foot drop. He has no incontinence, no pain or discomfort when resting, no trauma, no injection to site, no fever, no chills, no rashes, no abd pain, no n/v, no dysuria. In the ED, patient HDS and afebrile. He was admitted to medicine for pain control and rehab evaluation. (18 Jun 2018 15:58)    Interval: acute on chronic bilateral hip pain exacerbated by stress test several weeks ago. Hip MRI not yet performed. Statin on hold in case aetiology of pain is statin related myopathy. CK WNL.XR hips bilateral 6/18/18:IMPRESSION:  No acute bone or joint disease.    Today pain is unchanged though pt was seen ambulating comfortably with nursing support and walker in hallway.     Vital Signs Last 24 Hrs  T(C): 36.8 (20 Jun 2018 12:43), Max: 36.8 (20 Jun 2018 12:43)  T(F): 98.3 (20 Jun 2018 12:43), Max: 98.3 (20 Jun 2018 12:43)  HR: 63 (20 Jun 2018 12:43) (60 - 70)  BP: 124/74 (20 Jun 2018 12:43) (124/74 - 145/87)  BP(mean): --  RR: 18 (20 Jun 2018 12:43) (17 - 18)  SpO2: 99% (20 Jun 2018 12:43) (97% - 100%)      ----------------------------------------------------------------------------------------  PHYSICAL EXAM  Constitutional - NAD, Comfortable  HEENT - NCAT, EOMI  Neck - Supple, No limited ROM  Chest - CTA bilaterally, No wheeze, No rhonchi, No crackles  Cardiovascular - RRR, S1S2, No murmurs  Abdomen - BS+, Soft, NTND  Extremities - No C/C/E, No calf tenderness. b/l LEs NTTP  Neurologic Exam -                    Cognitive - Awake, Alert, AAO to self, place, date, year, situation     Communication - Fluent, No dysarthria, no aphasia     Cranial Nerves - CN 2-12 intact     Motor -   5/5 MMT in b/l upper and lower extremities  negative Audie test  negative MILKA test bilaterally.  no pain with SLR                  Sensory - Intact to LT     Reflexes - DTR Intact, No primitive reflexive     Balance - standing balance fair. sit-stand transfer and walking with supervision/CGA  Psychiatric - Mood stable, Affect WNL    ASSESSMENT:    77yo male with right & left LE pain (R>>L) and 2 week h/o gait impairment.     Pain does not seem neuropathic in nature. Pain origin does not seem to be in the hips, in fact patient endorses pain in medial thigh region and posterior calves, same distribution bilaterally, but pain severity R >> L. Pain worse with ambulation and back extension.    MRI to be done as inpt, will follow results.     Discharge home with assistive device (d/w J PT, consider rolling walker) when medically optimized.    Thank you for allowing us to participate in the care of this patient.
MD DAHIANA  78y  Male      Patient is a 78y old  Male who presents with a chief complaint of Right hip pain (18 Jun 2018 15:58)  c/o b/l leg pains rt.>lt,mainly thigh area,worse on ambulation.n/o h/o fall.no numbness inlegs,no cp,no sob    REVIEW OF SYSTEMS:  as above  INTERVAL HPI/OVERNIGHT EVENTS:  T(C): 36.6 (06-20-18 @ 21:25), Max: 36.8 (06-20-18 @ 12:43)  HR: 71 (06-20-18 @ 21:25) (63 - 71)  BP: 142/84 (06-20-18 @ 21:25) (124/74 - 145/87)  RR: 17 (06-20-18 @ 21:25) (17 - 18)  SpO2: 95% (06-20-18 @ 21:25) (95% - 100%)  Wt(kg): --  I&O's Summary    T(C): 36.6 (06-20-18 @ 21:25), Max: 36.8 (06-20-18 @ 12:43)  HR: 71 (06-20-18 @ 21:25) (63 - 71)  BP: 142/84 (06-20-18 @ 21:25) (124/74 - 145/87)  RR: 17 (06-20-18 @ 21:25) (17 - 18)  SpO2: 95% (06-20-18 @ 21:25) (95% - 100%)  Wt(kg): --Vital Signs Last 24 Hrs  T(C): 36.6 (20 Jun 2018 21:25), Max: 36.8 (20 Jun 2018 12:43)  T(F): 97.8 (20 Jun 2018 21:25), Max: 98.3 (20 Jun 2018 12:43)  HR: 71 (20 Jun 2018 21:25) (63 - 71)  BP: 142/84 (20 Jun 2018 21:25) (124/74 - 145/87)  BP(mean): --  RR: 17 (20 Jun 2018 21:25) (17 - 18)  SpO2: 95% (20 Jun 2018 21:25) (95% - 100%)    LABS:                        16.9   8.65  )-----------( 163      ( 19 Jun 2018 05:10 )             51.4     06-19    141  |  99  |  15  ----------------------------<  108<H>  3.9   |  29  |  1.42<H>    Ca    8.6      19 Jun 2018 05:10          CAPILLARY BLOOD GLUCOSE      POCT Blood Glucose.: 124 mg/dL (20 Jun 2018 17:19)  POCT Blood Glucose.: 143 mg/dL (20 Jun 2018 11:54)  POCT Blood Glucose.: 142 mg/dL (20 Jun 2018 07:20)            PAST MEDICAL & SURGICAL HISTORY:  Essential hypertension: Hypertension  Type 2 diabetes mellitus: Diabetes  Hypercholesterolemia: Hypercholesteremia  GERD (gastroesophageal reflux disease)  Diabetes mellitus type II  Hyperlipidemia  HTN - Hypertension  Other postprocedural status: S/P hemorrhoidectomy  H/O colonoscopy: 7/12      MEDICATIONS  (STANDING):  amLODIPine   Tablet 10 milliGRAM(s) Oral daily  aspirin enteric coated 81 milliGRAM(s) Oral daily  dextrose 5%. 1000 milliLiter(s) (50 mL/Hr) IV Continuous <Continuous>  dextrose 50% Injectable 12.5 Gram(s) IV Push once  dextrose 50% Injectable 25 Gram(s) IV Push once  dextrose 50% Injectable 25 Gram(s) IV Push once  enoxaparin Injectable 40 milliGRAM(s) SubCutaneous every 24 hours  gabapentin 100 milliGRAM(s) Oral every 8 hours  insulin lispro (HumaLOG) corrective regimen sliding scale   SubCutaneous three times a day before meals  losartan 100 milliGRAM(s) Oral daily  metoprolol succinate ER 50 milliGRAM(s) Oral daily    MEDICATIONS  (PRN):  acetaminophen   Tablet. 650 milliGRAM(s) Oral every 6 hours PRN Mild Pain (1 - 3)  dextrose 40% Gel 15 Gram(s) Oral once PRN Blood Glucose LESS THAN 70 milliGRAM(s)/deciliter  glucagon  Injectable 1 milliGRAM(s) IntraMuscular once PRN Glucose LESS THAN 70 milligrams/deciliter  traMADol 25 milliGRAM(s) Oral every 8 hours PRN Moderate Pain (4 - 6)        RADIOLOGY & ADDITIONAL TESTS:    Imaging Personally Reviewed:  [ ] YES  [ ] NO    Consultant(s) Notes Reviewed:  [ ] YES  [ ] NO    PHYSICAL EXAM:  GENERAL: NAD, well-groomed, well-developed  HEAD:  Atraumatic, Normocephalic  EYES: EOMI, PERRLA, conjunctiva and sclera clear  ENMT: No tonsillar erythema, exudates, or enlargement; Moist mucous membranes, Good dentition, No lesions  NECK: Supple, No JVD, Normal thyroid  NERVOUS SYSTEM:  Alert & Oriented X3, Good concentration; Motor Strength 5/5 B/L upper and lower extremities; DTRs 2+ intact and symmetric  CHEST/LUNG: Clear to percussion bilaterally; No rales, rhonchi, wheezing, or rubs  HEART: Regular rate and rhythm; No murmurs, rubs, or gallops  ABDOMEN: Soft, Nontender, Nondistended; Bowel sounds present  EXTREMITIES:  2+ Peripheral Pulses, No clubbing, cyanosis, or edema  LYMPH: No lymphadenopathy noted  SKIN: No rashes or lesions    Care Discussed with Consultants/Other Providers [x ] YES  [ ] NO      Code Status: [] Full Code [] DNR [] DNI [] Goals of Care:   Disposition: [] ICU [] Stroke Unit [] RCU []PCU []Floor [] Discharge Home         NARINDER Gruber.FACP
MD DAHIANA  78y  Male      Patient is a 78y old  Male who presents with a chief complaint of Right hip pain (18 Jun 2018 15:58)  Patient seen and examined.c/o rt hip pains chronic?no h/o fall.x-ray hip-neg    REVIEW OF SYSTEMS:  as above      INTERVAL HPI/OVERNIGHT EVENTS:  T(C): 36.4 (06-19-18 @ 20:39), Max: 36.8 (06-18-18 @ 21:35)  HR: 60 (06-19-18 @ 20:39) (60 - 69)  BP: 131/68 (06-19-18 @ 20:39) (125/66 - 152/79)  RR: 17 (06-19-18 @ 20:39) (17 - 18)  SpO2: 97% (06-19-18 @ 20:39) (96% - 99%)  Wt(kg): --  I&O's Summary    T(C): 36.4 (06-19-18 @ 20:39), Max: 36.8 (06-18-18 @ 21:35)  HR: 60 (06-19-18 @ 20:39) (60 - 69)  BP: 131/68 (06-19-18 @ 20:39) (125/66 - 152/79)  RR: 17 (06-19-18 @ 20:39) (17 - 18)  SpO2: 97% (06-19-18 @ 20:39) (96% - 99%)  Wt(kg): --Vital Signs Last 24 Hrs  T(C): 36.4 (19 Jun 2018 20:39), Max: 36.8 (18 Jun 2018 21:35)  T(F): 97.6 (19 Jun 2018 20:39), Max: 98.2 (18 Jun 2018 21:35)  HR: 60 (19 Jun 2018 20:39) (60 - 69)  BP: 131/68 (19 Jun 2018 20:39) (125/66 - 152/79)  BP(mean): --  RR: 17 (19 Jun 2018 20:39) (17 - 18)  SpO2: 97% (19 Jun 2018 20:39) (96% - 99%)    LABS:                        16.9   8.65  )-----------( 163      ( 19 Jun 2018 05:10 )             51.4     06-19    141  |  99  |  15  ----------------------------<  108<H>  3.9   |  29  |  1.42<H>    Ca    8.6      19 Jun 2018 05:10          CAPILLARY BLOOD GLUCOSE      POCT Blood Glucose.: 101 mg/dL (19 Jun 2018 17:27)  POCT Blood Glucose.: 163 mg/dL (19 Jun 2018 11:54)  POCT Blood Glucose.: 128 mg/dL (19 Jun 2018 07:53)  POCT Blood Glucose.: 193 mg/dL (18 Jun 2018 22:11)            PAST MEDICAL & SURGICAL HISTORY:  Essential hypertension: Hypertension  Type 2 diabetes mellitus: Diabetes  Hypercholesterolemia: Hypercholesteremia  GERD (gastroesophageal reflux disease)  Diabetes mellitus type II  Hyperlipidemia  HTN - Hypertension  Other postprocedural status: S/P hemorrhoidectomy  H/O colonoscopy: 7/12      MEDICATIONS  (STANDING):  amLODIPine   Tablet 10 milliGRAM(s) Oral daily  aspirin enteric coated 81 milliGRAM(s) Oral daily  dextrose 5%. 1000 milliLiter(s) (50 mL/Hr) IV Continuous <Continuous>  dextrose 50% Injectable 12.5 Gram(s) IV Push once  dextrose 50% Injectable 25 Gram(s) IV Push once  dextrose 50% Injectable 25 Gram(s) IV Push once  enoxaparin Injectable 40 milliGRAM(s) SubCutaneous every 24 hours  gabapentin 100 milliGRAM(s) Oral every 8 hours  insulin lispro (HumaLOG) corrective regimen sliding scale   SubCutaneous three times a day before meals  losartan 100 milliGRAM(s) Oral daily  metoprolol succinate ER 50 milliGRAM(s) Oral daily    MEDICATIONS  (PRN):  acetaminophen   Tablet. 650 milliGRAM(s) Oral every 6 hours PRN Mild Pain (1 - 3)  dextrose 40% Gel 15 Gram(s) Oral once PRN Blood Glucose LESS THAN 70 milliGRAM(s)/deciliter  glucagon  Injectable 1 milliGRAM(s) IntraMuscular once PRN Glucose LESS THAN 70 milligrams/deciliter  traMADol 25 milliGRAM(s) Oral every 8 hours PRN Moderate Pain (4 - 6)        RADIOLOGY & ADDITIONAL TESTS:    Imaging Personally Reviewed:  [ ] YES  [ ] NO    Consultant(s) Notes Reviewed:  [ ] YES  [ ] NO    PHYSICAL EXAM:  GENERAL: NAD, well-groomed, well-developed  HEAD:  Atraumatic, Normocephalic  EYES: EOMI, PERRLA, conjunctiva and sclera clear  ENMT: No tonsillar erythema, exudates, or enlargement; Moist mucous membranes, Good dentition, No lesions  NECK: Supple, No JVD, Normal thyroid  NERVOUS SYSTEM:  Alert & Oriented X3, Good concentration; Motor Strength 5/5 B/L upper and lower extremities; DTRs 2+ intact and symmetric  CHEST/LUNG: Clear to percussion bilaterally; No rales, rhonchi, wheezing, or rubs  HEART: Regular rate and rhythm; No murmurs, rubs, or gallops  ABDOMEN: Soft, Nontender, Nondistended; Bowel sounds present  EXTREMITIES:  2+ Peripheral Pulses, No clubbing, cyanosis, or edema  LYMPH: No lymphadenopathy noted  SKIN: No rashes or lesions    Care Discussed with Consultants/Other Providers [x ] YES  [ ] NO      Code Status: [] Full Code [] DNR [] DNI [] Goals of Care:   Disposition: [] ICU [] Stroke Unit [] RCU []PCU []Floor [] Discharge Home         NARINDER Gruber.FACP
MD DAHIANA  78y  Male      Patient is a 78y old  Male who presents with a chief complaint of Right hip pain (18 Jun 2018 15:58)  c/o chronic pain in b/l legs,R>.L.NO NEW C/O    REVIEW OF SYSTEMS:        INTERVAL HPI/OVERNIGHT EVENTS:  T(C): 36.6 (06-21-18 @ 12:22), Max: 36.6 (06-20-18 @ 21:25)  HR: 65 (06-21-18 @ 12:22) (63 - 71)  BP: 139/70 (06-21-18 @ 12:22) (121/75 - 142/84)  RR: 18 (06-21-18 @ 12:22) (17 - 18)  SpO2: 96% (06-21-18 @ 12:22) (95% - 98%)  Wt(kg): --  I&O's Summary    T(C): 36.6 (06-21-18 @ 12:22), Max: 36.6 (06-20-18 @ 21:25)  HR: 65 (06-21-18 @ 12:22) (63 - 71)  BP: 139/70 (06-21-18 @ 12:22) (121/75 - 142/84)  RR: 18 (06-21-18 @ 12:22) (17 - 18)  SpO2: 96% (06-21-18 @ 12:22) (95% - 98%)  Wt(kg): --Vital Signs Last 24 Hrs  T(C): 36.6 (21 Jun 2018 12:22), Max: 36.6 (20 Jun 2018 21:25)  T(F): 97.8 (21 Jun 2018 12:22), Max: 97.8 (20 Jun 2018 21:25)  HR: 65 (21 Jun 2018 12:22) (63 - 71)  BP: 139/70 (21 Jun 2018 12:22) (121/75 - 142/84)  BP(mean): --  RR: 18 (21 Jun 2018 12:22) (17 - 18)  SpO2: 96% (21 Jun 2018 12:22) (95% - 98%)    LABS:              CAPILLARY BLOOD GLUCOSE      POCT Blood Glucose.: 181 mg/dL (21 Jun 2018 12:09)  POCT Blood Glucose.: 129 mg/dL (21 Jun 2018 07:41)  POCT Blood Glucose.: 138 mg/dL (20 Jun 2018 21:40)  POCT Blood Glucose.: 124 mg/dL (20 Jun 2018 17:19)            PAST MEDICAL & SURGICAL HISTORY:  Essential hypertension: Hypertension  Type 2 diabetes mellitus: Diabetes  Hypercholesterolemia: Hypercholesteremia  GERD (gastroesophageal reflux disease)  Diabetes mellitus type II  Hyperlipidemia  HTN - Hypertension  Other postprocedural status: S/P hemorrhoidectomy  H/O colonoscopy: 7/12      MEDICATIONS  (STANDING):  amLODIPine   Tablet 10 milliGRAM(s) Oral daily  aspirin enteric coated 81 milliGRAM(s) Oral daily  dextrose 5%. 1000 milliLiter(s) (50 mL/Hr) IV Continuous <Continuous>  dextrose 50% Injectable 12.5 Gram(s) IV Push once  dextrose 50% Injectable 25 Gram(s) IV Push once  dextrose 50% Injectable 25 Gram(s) IV Push once  enoxaparin Injectable 40 milliGRAM(s) SubCutaneous every 24 hours  gabapentin 100 milliGRAM(s) Oral every 8 hours  insulin lispro (HumaLOG) corrective regimen sliding scale   SubCutaneous three times a day before meals  losartan 100 milliGRAM(s) Oral daily  metoprolol succinate ER 50 milliGRAM(s) Oral daily    MEDICATIONS  (PRN):  acetaminophen   Tablet. 650 milliGRAM(s) Oral every 6 hours PRN Mild Pain (1 - 3)  dextrose 40% Gel 15 Gram(s) Oral once PRN Blood Glucose LESS THAN 70 milliGRAM(s)/deciliter  glucagon  Injectable 1 milliGRAM(s) IntraMuscular once PRN Glucose LESS THAN 70 milligrams/deciliter  traMADol 25 milliGRAM(s) Oral every 8 hours PRN Moderate Pain (4 - 6)        RADIOLOGY & ADDITIONAL TESTS:    Imaging Personally Reviewed:  [ ] YES  [ ] NO    Consultant(s) Notes Reviewed:  [X ] YES  [ ] NO    PHYSICAL EXAM:  GENERAL: NAD, well-groomed, well-developed  HEAD:  Atraumatic, Normocephalic  EYES: EOMI, PERRLA, conjunctiva and sclera clear  ENMT: No tonsillar erythema, exudates, or enlargement; Moist mucous membranes, Good dentition, No lesions  NECK: Supple, No JVD, Normal thyroid  NERVOUS SYSTEM:  Alert & Oriented X3, Good concentration; Motor Strength 5/5 B/L upper and lower extremities; DTRs 2+ intact and symmetric  CHEST/LUNG: Clear to percussion bilaterally; No rales, rhonchi, wheezing, or rubs  HEART: Regular rate and rhythm; No murmurs, rubs, or gallops  ABDOMEN: Soft, Nontender, Nondistended; Bowel sounds present  EXTREMITIES:  2+ Peripheral Pulses, No clubbing, cyanosis, or edema  LYMPH: No lymphadenopathy noted  SKIN: No rashes or lesions    Care Discussed with Consultants/Other Providers [ ] YES  [ ] NO      Code Status: [] Full Code [] DNR [] DNI [] Goals of Care:   Disposition: [] ICU [] Stroke Unit [] RCU []PCU []Floor [] Discharge Home         NARINDER Gruber.FACP

## 2018-06-21 NOTE — CONSULT NOTE ADULT - PROBLEM SELECTOR RECOMMENDATION 9
Pain Control  Medrol Dosepack  Physical Therapy  Follow up with Dr. Munoz as outpatient  Case to be d/w Dr. Munoz Trial of Conservative management  Medrol Dosepack  Follow up with Dr. Munoz as outpatient 7/3/18, Call the office to make an appointment  Will discuss surgery as follow up   Case d/w Dr. Munoz

## 2018-06-21 NOTE — PROGRESS NOTE ADULT - ATTENDING COMMENTS
PT  -d/c planning  outpt MRI HIPS
-f/u MRI -[id number] DISC  -PT ,SPONDYLOSIS?-ORTHO EVAL.D/W FAMILY AT BEDSIDE  -Outpt.vascular eval.  f/u with PCP,PMR as outpt
-f/u MRI Hips  -PT  -Outpt.vascular eval.  f/u with PCP,PMR as outpt

## 2018-06-21 NOTE — CONSULT NOTE ADULT - SUBJECTIVE AND OBJECTIVE BOX
HPI:  This is an  Patient was able to use walker to ambulate 2 weeks ago then in the past 4 days his pain worsened to the point where bearing weigh causes pain from posterior hip down to leg. He describes pain as aching, localized to hip and thighs, bilateral LE's, right worse than left, precipitated by doing treadmill stress test, better with rest, worse with ambulation, and associated with some right foot drop. He has no incontinence, no pain or discomfort when resting, no trauma, no injection to site, no fever, no chills, no rashes, no abd pain, no n/v, no dysuria. In the ED, patient HDS and afebrile. He was admitted to medicine for pain control and rehab evaluation. (18 Jun 2018 15:58)\  Neurosurgery consulted for MRI with L4-L5 HNP, pt endorses pain in b/l posterior LE Right side greater than left for a few months worsening over the last 4 weeks. Pt reports pain worsened after treadmill stress test. Over Last two weeks he has experienced difficulty with ambulation secondary to pain, now using a walker. He recently retired 7 months ago and states he does a lot of sitting    PAST MEDICAL & SURGICAL HISTORY:  Essential hypertension: Hypertension  Type 2 diabetes mellitus: Diabetes  Hypercholesterolemia: Hypercholesteremia  GERD (gastroesophageal reflux disease)  Diabetes mellitus type II  Hyperlipidemia  HTN - Hypertension  Other postprocedural status: S/P hemorrhoidectomy  H/O colonoscopy: 7/12    Allergies    No Known Allergies    Intolerances      acetaminophen   Tablet. 650 milliGRAM(s) Oral every 6 hours PRN  amLODIPine   Tablet 10 milliGRAM(s) Oral daily  aspirin enteric coated 81 milliGRAM(s) Oral daily  dextrose 40% Gel 15 Gram(s) Oral once PRN  dextrose 5%. 1000 milliLiter(s) IV Continuous <Continuous>  dextrose 50% Injectable 12.5 Gram(s) IV Push once  dextrose 50% Injectable 25 Gram(s) IV Push once  dextrose 50% Injectable 25 Gram(s) IV Push once  enoxaparin Injectable 40 milliGRAM(s) SubCutaneous every 24 hours  gabapentin 100 milliGRAM(s) Oral every 8 hours  glucagon  Injectable 1 milliGRAM(s) IntraMuscular once PRN  insulin lispro (HumaLOG) corrective regimen sliding scale   SubCutaneous three times a day before meals  losartan 100 milliGRAM(s) Oral daily  metoprolol succinate ER 50 milliGRAM(s) Oral daily  traMADol 25 milliGRAM(s) Oral every 8 hours PRN    SOCIAL HISTORY:  FAMILY HISTORY:  Family history of cardiovascular disease (Father, Mother, Sibling, Child, Grandparent, Aunt): Family history of hypertension  Family history of diabetes mellitus (Father, Mother, Sibling, Child, Grandparent, Aunt): Family history of diabetes mellitus    Vital Signs Last 24 Hrs  T(C): 36.6 (21 Jun 2018 12:22), Max: 36.6 (20 Jun 2018 21:25)  T(F): 97.8 (21 Jun 2018 12:22), Max: 97.8 (20 Jun 2018 21:25)  HR: 65 (21 Jun 2018 12:22) (63 - 71)  BP: 139/70 (21 Jun 2018 12:22) (121/75 - 142/84)  BP(mean): --  RR: 18 (21 Jun 2018 12:22) (17 - 18)  SpO2: 96% (21 Jun 2018 12:22) (95% - 98%)    PHYSICAL EXAM:  Awake Alert Attentive Affect appropriate Ox3  PERRL EOMI  Motor:   Tone: normal.                  Strength:     [X] Upper extremity                      Delt       Bicep    Tricep                                                  R         5/5        5/5        5/5       5/5                                               L          5/5        5/5        5/5       5/5  [X] Lower extremity                       HF          KE          KF        DF         PF                                               R        5/5        5/5        5/5       5/5       5/5                                               L         5/5        5/5       5/5       5/5        5/5  Sensory Intact to Light Touch  Reflexes WNL, No clonus    RADIOLOGY & ADDITIONAL STUDIES:  < from: MR Lumbar Spine No Cont (06.21.18 @ 11:57) >  IMPRESSION:    Spondylitic changes as described above.  L4-L5 disc bulge with superimposed central extruded disc herniation with   cephalad migration. Moderate to severe L4-L5 spinal stenosis with   compression of the thecal sac and bilateral foraminal stenosis.  Chronic T11 compression fracture.  < end of copied text >

## 2018-06-21 NOTE — PROGRESS NOTE ADULT - PROBLEM SELECTOR PROBLEM 3
CAD S/P percutaneous coronary angioplasty

## 2018-06-21 NOTE — PROGRESS NOTE ADULT - PROBLEM SELECTOR PROBLEM 7
R/O Stage 3 chronic kidney disease

## 2018-06-21 NOTE — PROGRESS NOTE ADULT - PROBLEM SELECTOR PLAN 8
DVT ppx- Lovenox  Diet- DASH Halal

## 2018-06-21 NOTE — PROGRESS NOTE ADULT - PROBLEM SELECTOR PLAN 1
R > L, appears to be acute on chronic with worsening after stress test 2 weeks ago. No clear injury noted on exam and without any signs of cord compression. DDx includes hip strain vs. neuropathy (less likely sciatica) vs. myopathy etc.  - X-ray of right hip negative   - PMR f/u noted.MRI Hips to be done inpatient  - PT/OT  - Pain control: start gabapentin 100mg TID along with tramadol prn for pain  - Hold statin in case of statin induced myopathy although low suspicion
R > L, appears to be acute on chronic with worsening after stress test 2 weeks ago. No clear injury noted on exam and without any signs of cord compression. DDx includes hip strain vs. neuropathy (less likely sciatica) vs. myopathy etc.  - X-ray of right hip negative   - PMR f/u noted.MRI Hips to be done inpatient  - PT/OT  - Pain control: start gabapentin 100mg TID along with tramadol prn for pain  - Hold statin in case of statin induced myopathy although low suspicion
R > L, appears to be acute on chronic with worsening after stress test 2 weeks ago. No clear injury noted on exam and without any signs of cord compression. DDx includes hip strain vs. neuropathy (less likely sciatica) vs. myopathy etc.  - X-ray of right hip negative   - PMR consult placed; will await recs  - PT/OT  - Pain control: start gabapentin 100mg TID along with tramadol prn for pain  - Hold statin in case of statin induced myopathy although low suspicion   - Check CPK levels in AM  - Will hold off on MRI hip for now given patient clinically in no distress. Await PMR recs for further imaging

## 2018-06-21 NOTE — CONSULT NOTE ADULT - ASSESSMENT
78year old male with PMH DM, HTN, HLD, CAD s/p stents p/w b/l lower extremity pain found to have L4-L5 HNP with spinal stenosis

## 2018-06-21 NOTE — PROGRESS NOTE ADULT - PROBLEM SELECTOR PROBLEM 6
Type 2 diabetes mellitus with other circulatory complication, without long-term current use of insulin

## 2018-06-21 NOTE — PROGRESS NOTE ADULT - PROBLEM SELECTOR PLAN 5
- Hold statin for now per above  - Check lipid panel

## 2018-06-21 NOTE — PROGRESS NOTE ADULT - PROBLEM SELECTOR PLAN 2
K 3.3 > repleted  - Check BMP in AM

## 2018-06-22 ENCOUNTER — TRANSCRIPTION ENCOUNTER (OUTPATIENT)
Age: 78
End: 2018-06-22

## 2018-06-22 VITALS
SYSTOLIC BLOOD PRESSURE: 121 MMHG | TEMPERATURE: 98 F | HEART RATE: 69 BPM | OXYGEN SATURATION: 98 % | DIASTOLIC BLOOD PRESSURE: 73 MMHG | RESPIRATION RATE: 17 BRPM

## 2018-06-22 LAB
GLUCOSE BLDC GLUCOMTR-MCNC: 220 MG/DL — HIGH (ref 70–99)
GLUCOSE BLDC GLUCOMTR-MCNC: 246 MG/DL — HIGH (ref 70–99)

## 2018-06-22 RX ORDER — GABAPENTIN 400 MG/1
1 CAPSULE ORAL
Qty: 90 | Refills: 0 | OUTPATIENT
Start: 2018-06-22 | End: 2018-07-21

## 2018-06-22 RX ORDER — ACETAMINOPHEN 500 MG
2 TABLET ORAL
Qty: 0 | Refills: 0 | COMMUNITY
Start: 2018-06-22

## 2018-06-22 RX ORDER — TRAMADOL HYDROCHLORIDE 50 MG/1
0.5 TABLET ORAL
Qty: 10.5 | Refills: 0 | OUTPATIENT
Start: 2018-06-22 | End: 2018-06-28

## 2018-06-22 RX ADMIN — ENOXAPARIN SODIUM 40 MILLIGRAM(S): 100 INJECTION SUBCUTANEOUS at 05:07

## 2018-06-22 RX ADMIN — LOSARTAN POTASSIUM 100 MILLIGRAM(S): 100 TABLET, FILM COATED ORAL at 05:05

## 2018-06-22 RX ADMIN — Medication 2: at 12:34

## 2018-06-22 RX ADMIN — Medication 4 MILLIGRAM(S): at 13:06

## 2018-06-22 RX ADMIN — Medication 81 MILLIGRAM(S): at 13:06

## 2018-06-22 RX ADMIN — GABAPENTIN 100 MILLIGRAM(S): 400 CAPSULE ORAL at 05:05

## 2018-06-22 RX ADMIN — AMLODIPINE BESYLATE 10 MILLIGRAM(S): 2.5 TABLET ORAL at 05:05

## 2018-06-22 RX ADMIN — GABAPENTIN 100 MILLIGRAM(S): 400 CAPSULE ORAL at 13:06

## 2018-06-22 RX ADMIN — Medication 50 MILLIGRAM(S): at 05:05

## 2018-06-22 RX ADMIN — Medication 4 MILLIGRAM(S): at 06:23

## 2018-06-22 RX ADMIN — Medication 2: at 08:00

## 2018-06-22 NOTE — DISCHARGE NOTE ADULT - CARE PROVIDERS DIRECT ADDRESSES
[email or web address]

## 2018-06-22 NOTE — DISCHARGE NOTE ADULT - CARE PLAN
Principal Discharge DX:	Herniated nucleus pulposus, L4-5  Goal:	pain management  Assessment and plan of treatment:	Please follow up with your pcp within 1 week of discharge.  Please call to make an appointment within 1 week of discharge. Please follow up with Dr. Donald Neurosurgeon.as outpatient 7/3/18  Please call to make an appointment within 1 week of discharge.  Please continue steroids as prescribed.  Secondary Diagnosis:	CAD S/P percutaneous coronary angioplasty  Goal:	continue current regimen  Assessment and plan of treatment:	Please follow up with your pcp within 1 week of discharge.  please call to make an appointment within 1 week of discharge. .  Please follow up with your cardiologist within 1 week of discharge.  Please call to make an appointment within 1 week of discharge.  Secondary Diagnosis:	SUDHIR (acute kidney injury)  Goal:	resolved  Assessment and plan of treatment:	- Can c/w ARB for now   - Trend SCr daily.  Secondary Diagnosis:	Diabetes mellitus type II, controlled  Goal:	maintain adequate glucose control  Assessment and plan of treatment:	Please monitor fingersticks.  please continue your oral regimen as prescribed.  Please follow up with your pcp within 1 week of discharge.  Please call to make an appointment within 1 week of discharge  Secondary Diagnosis:	Essential hypertension  Goal:	maintain adequate blood pressure control  Assessment and plan of treatment:	please monitor your blood pressure prior to taking your blood pressure medications.   Please continue your medication as prescribed. prescribed.  Please follow up with your pcp within 1 week of discharge.  Please call to make an appointment within 1 week of discharge  Secondary Diagnosis:	Hyperlipidemia  Goal:	continue current regimen  Assessment and plan of treatment:	prescribed.  Please follow up with your pcp within 1 week of discharge.  Please call to make an appointment within 1 week of discharge

## 2018-06-22 NOTE — DISCHARGE NOTE ADULT - MEDICATION SUMMARY - MEDICATIONS TO TAKE
I will START or STAY ON the medications listed below when I get home from the hospital:    Medrol Dosepak 4 mg oral tablet  -- 4mg before breakfast stop after 4 doses; 4mg after lunch x3 doses, 4mg after dinner x 2 doses, 8mg qhs x 1 dose, 4mg qhs x 3 doses medrolpak  -- It is very important that you take or use this exactly as directed.  Do not skip doses or discontinue unless directed by your doctor.  Obtain medical advice before taking any non-prescription drugs as some may affect the action of this medication.  Take with food or milk.    -- Indication: For Herniated nucleus pulposus, L4-5    Aspir 81 oral delayed release tablet  -- 1 tab(s) by mouth once a day  -- Indication: For CAD S/P percutaneous coronary angioplasty    acetaminophen 325 mg oral tablet  -- 2 tab(s) by mouth every 6 hours, As needed, Mild Pain (1 - 3)  -- Indication: For pain    traMADol 50 mg oral tablet  -- 0.5 tab(s) by mouth every 8 hours, As needed, Moderate Pain (4 - 6) MDD:[id number]  -- Indication: For pain    losartan 100 mg oral tablet  -- 1 tab(s) by mouth once a day  -- Indication: For Htn     gabapentin 100 mg oral capsule  -- 1 cap(s) by mouth every 8 hours  -- Indication: For pain     glimepiride 1 mg oral tablet  -- 1 tab(s) by mouth once a day  -- Indication: For diabetes mellitus    simvastatin 10 mg oral tablet  -- 1 tab(s) by mouth once a day (at bedtime)  -- Indication: For Hyperlipidemia     metoprolol succinate 50 mg oral tablet, extended release  -- 1 tab(s) by mouth once a day  -- Indication: For Htn     amLODIPine 10 mg oral tablet  -- 1 tab(s) by mouth once a day  -- Indication: For Htn

## 2018-06-22 NOTE — DISCHARGE NOTE ADULT - SECONDARY DIAGNOSIS.
CAD S/P percutaneous coronary angioplasty SUDHIR (acute kidney injury) Diabetes mellitus type II, controlled Essential hypertension Hyperlipidemia

## 2018-06-22 NOTE — DISCHARGE NOTE ADULT - CARE PROVIDER_API CALL
Hao Munoz), Neurosurgery  General  [address] [address]  Phone: [phone]  Fax: [phone]    Sanchez Locke (MD), Surgery; Vascular Surgery  [address] [place] [address]  Phone: [phone]  Fax: [phone]

## 2018-06-22 NOTE — DISCHARGE NOTE ADULT - HOSPITAL COURSE
78 yr old male with hx of HTN, HLD, DM 2 and CAD s/p JOSH who presents to ED who c/o chronic right posterior gluteal and hip pain worsening for the past 4 wks after having a stress test.    Hip pain, bilateral.  -R > L, appears to be acute on chronic with worsening after stress test 2 weeks ago.  - X-ray of right hip: No acute bone or joint disease.  -PM & R recs  Pt to follow up guillermo Locke [phone] Vascular as an outpt d/w medical attending r/o claudication  -Discharge home with assistive device (d/w [facility] PT, consider rolling walker) when medically optimized.  - PT/OT: home with home PT   - Pain control: start gabapentin 100mg TID along with tramadol prn for pain  - Hold statin in case of statin induced myopathy although low suspicion   - CK: 130 WNL  -PM &R:MRI lumbar spine:Spondyolitic changes. L4-L5 disc bulge with superimposed central extruded disc herniation with   cephalad migration. Moderate to severe L4-L5 spinal stenosis with   compression of the thecal sac and bilateral foraminal stenosis.  Chronic T11 compression fracture.  -Neuro Sx consulted  Herniated nucleus pulposus, L4-5  -Trial of Conservative management  -Medrol Dosepack  -Follow up with Dr. Munoz as outpatient 7/3/18:explained high likelihood of need for lumbar laminectomy and discectomy to alleviate symptoms    Hypokalemia  - K 3.3 > repleted    CAD   -S/P percutaneous coronary angioplasty.    - C/w ASA  - Hold statin for now in case of statin induced myalgias or myopathy; can re-start if no improvement off of statin   - C/w ARB and BB.     Essential hypertension  - C/w CCB, ARB, and BB  - Monitor VS's.     HLD  - Hold statin for now per above    Type 2 DM  - Hold oral hypoglycemic agents while hospitalized  -A1C: 7.9 elevated     R/O Stage 3 chronic kidney disease  - Can c/w ARB for now   - Trend SCr daily.       Dispo: Home with home PT

## 2018-06-22 NOTE — DISCHARGE NOTE ADULT - PATIENT PORTAL LINK FT
You can access the [facility] Patient Portal, offered by [facility], by registering with the following website: [email or web address]

## 2018-06-22 NOTE — DISCHARGE NOTE ADULT - PLAN OF CARE
continue current regimen prescribed.  Please follow up with your pcp within 1 week of discharge.  Please call to make an appointment within 1 week of discharge pain management Please follow up with your pcp within 1 week of discharge.  Please call to make an appointment within 1 week of discharge. Please follow up with Dr. Donald Neurosurgeon.as outpatient 7/3/18  Please call to make an appointment within 1 week of discharge.  Please continue steroids as prescribed. Please follow up with your pcp within 1 week of discharge.  please call to make an appointment within 1 week of discharge. .  Please follow up with your cardiologist within 1 week of discharge.  Please call to make an appointment within 1 week of discharge. resolved - Can c/w ARB for now   - Trend SCr daily. maintain adequate glucose control Please monitor fingersticks.  please continue your oral regimen as prescribed.  Please follow up with your pcp within 1 week of discharge.  Please call to make an appointment within 1 week of discharge maintain adequate blood pressure control please monitor your blood pressure prior to taking your blood pressure medications.   Please continue your medication as prescribed. prescribed.  Please follow up with your pcp within 1 week of discharge.  Please call to make an appointment within 1 week of discharge

## 2018-07-26 ENCOUNTER — APPOINTMENT (OUTPATIENT)
Dept: VASCULAR SURGERY | Facility: CLINIC | Age: 78
End: 2018-07-26
Payer: MEDICARE

## 2018-07-26 VITALS
WEIGHT: 150 LBS | SYSTOLIC BLOOD PRESSURE: 147 MMHG | HEART RATE: 75 BPM | DIASTOLIC BLOOD PRESSURE: 75 MMHG | HEIGHT: 62 IN | BODY MASS INDEX: 27.6 KG/M2 | TEMPERATURE: 98.1 F

## 2018-07-26 VITALS — SYSTOLIC BLOOD PRESSURE: 138 MMHG | DIASTOLIC BLOOD PRESSURE: 76 MMHG | HEART RATE: 76 BPM

## 2018-07-26 DIAGNOSIS — M79.604 PAIN IN RIGHT LEG: ICD-10-CM

## 2018-07-26 DIAGNOSIS — M79.605 PAIN IN RIGHT LEG: ICD-10-CM

## 2018-07-26 PROCEDURE — 99203 OFFICE O/P NEW LOW 30 MIN: CPT

## 2018-07-31 ENCOUNTER — APPOINTMENT (OUTPATIENT)
Dept: NEUROSURGERY | Facility: CLINIC | Age: 78
End: 2018-07-31
Payer: MEDICARE

## 2018-07-31 VITALS
HEART RATE: 67 BPM | WEIGHT: 150 LBS | HEIGHT: 62 IN | SYSTOLIC BLOOD PRESSURE: 159 MMHG | BODY MASS INDEX: 27.6 KG/M2 | DIASTOLIC BLOOD PRESSURE: 77 MMHG

## 2018-07-31 DIAGNOSIS — M54.16 RADICULOPATHY, LUMBAR REGION: ICD-10-CM

## 2018-07-31 DIAGNOSIS — M54.10 RADICULOPATHY, SITE UNSPECIFIED: ICD-10-CM

## 2018-07-31 DIAGNOSIS — Z86.39 PERSONAL HISTORY OF OTHER ENDOCRINE, NUTRITIONAL AND METABOLIC DISEASE: ICD-10-CM

## 2018-07-31 DIAGNOSIS — R20.2 PARESTHESIA OF SKIN: ICD-10-CM

## 2018-07-31 DIAGNOSIS — Z86.79 PERSONAL HISTORY OF OTHER DISEASES OF THE CIRCULATORY SYSTEM: ICD-10-CM

## 2018-07-31 DIAGNOSIS — Z83.3 FAMILY HISTORY OF DIABETES MELLITUS: ICD-10-CM

## 2018-07-31 DIAGNOSIS — Z82.49 FAMILY HISTORY OF ISCHEMIC HEART DISEASE AND OTHER DISEASES OF THE CIRCULATORY SYSTEM: ICD-10-CM

## 2018-07-31 PROCEDURE — 99214 OFFICE O/P EST MOD 30 MIN: CPT

## 2018-08-03 PROBLEM — Z86.79 HISTORY OF ESSENTIAL HYPERTENSION: Status: RESOLVED | Noted: 2018-07-26 | Resolved: 2018-08-03

## 2018-08-03 PROBLEM — Z83.3 FAMILY HISTORY OF DIABETES MELLITUS: Status: ACTIVE | Noted: 2018-07-26

## 2018-08-03 PROBLEM — M54.10 ACUTE LOW BACK PAIN WITH RADICULAR SYMPTOMS, DURATION LESS THAN 6 WEEKS: Status: ACTIVE | Noted: 2018-08-03

## 2018-08-03 PROBLEM — M54.16 CHRONIC LUMBAR RADICULOPATHY: Status: ACTIVE | Noted: 2018-07-31

## 2018-08-03 PROBLEM — Z86.39 HISTORY OF DIABETES MELLITUS: Status: RESOLVED | Noted: 2018-07-26 | Resolved: 2018-08-03

## 2018-08-03 PROBLEM — R20.2 BILATERAL LEG PARESTHESIA: Status: ACTIVE | Noted: 2018-07-31

## 2018-08-03 PROBLEM — Z82.49 FAMILY HISTORY OF ESSENTIAL HYPERTENSION: Status: ACTIVE | Noted: 2018-07-26

## 2018-08-03 RX ORDER — GLIMEPIRIDE 1 MG/1
1 TABLET ORAL
Refills: 0 | Status: ACTIVE | COMMUNITY

## 2018-08-03 RX ORDER — METOPROLOL SUCCINATE 50 MG/1
50 TABLET, EXTENDED RELEASE ORAL
Refills: 0 | Status: ACTIVE | COMMUNITY

## 2018-08-03 RX ORDER — GABAPENTIN 100 MG
100 TABLET ORAL
Refills: 0 | Status: ACTIVE | COMMUNITY

## 2018-08-03 RX ORDER — AMLODIPINE BESYLATE 10 MG/1
10 TABLET ORAL
Refills: 0 | Status: ACTIVE | COMMUNITY

## 2018-08-03 RX ORDER — LOSARTAN POTASSIUM 100 MG/1
100 TABLET, FILM COATED ORAL
Refills: 0 | Status: ACTIVE | COMMUNITY

## 2018-08-03 RX ORDER — SIMVASTATIN 10 MG/1
10 TABLET, FILM COATED ORAL
Refills: 0 | Status: ACTIVE | COMMUNITY

## 2018-08-03 RX ORDER — ASPIRIN 81 MG
81 TABLET, DELAYED RELEASE (ENTERIC COATED) ORAL
Refills: 0 | Status: ACTIVE | COMMUNITY

## 2018-10-05 ENCOUNTER — APPOINTMENT (OUTPATIENT)
Dept: NEUROLOGY | Facility: CLINIC | Age: 78
End: 2018-10-05
Payer: MEDICARE

## 2018-10-05 VITALS
WEIGHT: 150 LBS | HEART RATE: 81 BPM | DIASTOLIC BLOOD PRESSURE: 60 MMHG | HEIGHT: 62 IN | BODY MASS INDEX: 27.6 KG/M2 | SYSTOLIC BLOOD PRESSURE: 159 MMHG

## 2018-10-05 DIAGNOSIS — M48.061 SPINAL STENOSIS, LUMBAR REGION WITHOUT NEUROGENIC CLAUDICATION: ICD-10-CM

## 2018-10-05 PROCEDURE — 99203 OFFICE O/P NEW LOW 30 MIN: CPT

## 2018-10-05 RX ORDER — METHYLPREDNISOLONE 4 MG/1
4 TABLET ORAL
Refills: 0 | Status: DISCONTINUED | COMMUNITY
End: 2018-10-05

## 2018-10-05 RX ORDER — TRAMADOL HYDROCHLORIDE 50 MG/1
50 TABLET, COATED ORAL
Refills: 0 | Status: DISCONTINUED | COMMUNITY
End: 2018-10-05

## 2018-10-28 NOTE — PROGRESS NOTE ADULT - PROBLEM SELECTOR PLAN 4
- C/w CCB, ARB, and BB  - Monitor VS's
none

## 2018-11-08 VITALS
HEIGHT: 63 IN | TEMPERATURE: 98 F | DIASTOLIC BLOOD PRESSURE: 82 MMHG | RESPIRATION RATE: 16 BRPM | WEIGHT: 147.05 LBS | OXYGEN SATURATION: 98 % | HEART RATE: 68 BPM | SYSTOLIC BLOOD PRESSURE: 155 MMHG

## 2018-11-08 NOTE — H&P ADULT - NSHPLABSRESULTS_GEN_ALL_CORE
EKG: SR with 1st degree AV block, PVC                          16.3   8.6   )-----------( 245      ( 09 Nov 2018 10:56 )             47.2   PT/INR - ( 09 Nov 2018 10:56 )   PT: 10.8 sec;   INR: 0.96          PTT - ( 09 Nov 2018 10:56 )  PTT:32.8 sec

## 2018-11-08 NOTE — H&P ADULT - ASSESSMENT
77 y/o Kiswahili/Limited English Speaking M w/ FMHX CAD, PMHX HTN, HLD, DM, CAD s/p prior PCI at [facility] in 5/2015 (JOSH OM1, EDP 8), CKD stage 3 (Cr. 1.4-1.5 in 6/2018), h/o lower GI bleed in 2012 (s/p colonoscopy w/ polyp removal and s/p rectal tumor resection/hemorroidectomy that admission, denies any GI bleed since then, denies h/o EGD), Herniated nucleus pulposus and Spinal Stenosis L4-5 and Chronic Back Pain resulting in hospitalization at [facility] in 6/2018 treated w/ steroids (did not have laminectomy/discectomy), who in light of pt's risk factors, CCS Angina Class 3 Sx, abnormal NST, now presents for cardiac catheterization w/ possible intervention and IV hydration prior to cath.    ASA: III   Mallampati: III    Precath/consented  Pt states he took ASA 81mg PO this AM. Load with  mg PO x 1 and Brilinta 180mg PO x 1 as per d/w Dr. Fisher  IVF NS @ 50cc/hr as pt noted to have 1+ pitting edema b/l. No JVD and lungs CTABL    Risks & benefits of procedure and alternative therapy have been explained to the patient including but not limited to: allergic reaction, bleeding w/possible need for blood transfusion, infection, renal and vascular compromise, limb damage, arrhythmia, stroke, vessel dissection/perforation, Myocardial infarction, emergent CABG. Informed consent obtained and in chart. 79 y/o Hebrew/Limited English Speaking M w/ FMHX CAD, PMHX HTN, HLD, DM, CAD s/p prior PCI at [facility] in 5/2015 (JOSH OM1, EDP 8), CKD stage 3 (Cr. 1.4-1.5 in 6/2018), h/o lower GI bleed in 2012 (s/p colonoscopy w/ polyp removal and s/p rectal tumor resection/hemorroidectomy that admission, denies any GI bleed since then, denies h/o EGD), Herniated nucleus pulposus and Spinal Stenosis L4-5 and Chronic Back Pain resulting in hospitalization at [facility] in 6/2018 treated w/ steroids (did not have laminectomy/discectomy), who in light of pt's risk factors, CCS Angina Class 3 Sx, abnormal NST, now presents for cardiac catheterization w/ possible intervention and IV hydration prior to cath.    ASA: III   Mallampati: III    Precath/consented  Pt states he took ASA 81mg PO this AM. Load with  mg PO x 1 and Brilinta 180mg PO x 1 as per d/w Dr. Fisher  IVF NS @ 50cc/hr as pt noted to have 1+ pitting edema b/l. No JVD and lungs CTABL, Cr 1.33  K 3.6 repleted with KCl 40mEq PO x 1    Risks & benefits of procedure and alternative therapy have been explained to the patient including but not limited to: allergic reaction, bleeding w/possible need for blood transfusion, infection, renal and vascular compromise, limb damage, arrhythmia, stroke, vessel dissection/perforation, Myocardial infarction, emergent CABG. Informed consent obtained and in chart.

## 2018-11-08 NOTE — H&P ADULT - RS GEN PE MLT RESP DETAILS PC
normal/respirations non-labored/no intercostal retractions/no rales/no wheezes/no rhonchi/no chest wall tenderness

## 2018-11-08 NOTE — H&P ADULT - HISTORY OF PRESENT ILLNESS
*Confirm Medications on Arrival    History obtained from Daughter Most Begum (Semi-Reliable Historian)    79 y/o Japanese/Limited English Speaking M w/ FMHX CAD, PMHX HTN, HLD, DM, CAD s/p prior PCI at [facility] in 5/2015 (JOSH OM1, EDP 8), CKD stage 3 (Cr. 1.4-1.5 in 6/2018), h/o lower GI bleed in 2012 (s/p colonoscopy w/ polyp removal and s/p rectal tumor resection/hemorroidectomy that admission, denies any GI bleed since then, denies h/o EGD), Herniated nucleus pulposus and Spinal Stenosis L4-5 and Chronic Back Pain resulting in hospitalization at [facility] in 6/2018 treated w/ steroids (did not have laminectomy/discectomy), who presented to their cardiologist c/o KEATING on ambulating 2 blocks (CCS Angina Equivalent Class 3 symptoms). Pt had an abnormal NST in 9/2018 revealing a small amount of ischemia in the distal infero-lateral wall, EF 61%.  Echo in 4/2018 showed EF 75%, no wall motion abnormalities. Pt denies CP, dizziness, diaphoresis, palpitations, LE edema, orthopnea, PND, syncope    Due to pts risk factors, CCS Angina Class 3 Sx, abnormal NST, pt is referred for cardiac catheterization w/ possible intervention. IV hydration for CKD pre-cath. 77 y/o Spanish/Limited English Speaking M w/ FMHX CAD, PMHX HTN, HLD, DM, CAD s/p prior PCI at [facility] in 5/2015 (JOSH OM1, EDP 8), CKD stage 3 (Cr. 1.4-1.5 in 6/2018), h/o lower GI bleed in 2012 (s/p colonoscopy w/ polyp removal and s/p rectal tumor resection/hemorroidectomy that admission, denies any GI bleed since then, denies h/o EGD), Herniated nucleus pulposus and Spinal Stenosis L4-5 and Chronic Back Pain resulting in hospitalization at [facility] in 6/2018 treated w/ steroids (did not have laminectomy/discectomy), who presented to their cardiologist c/o KEATING on ambulating 2 blocks (CCS Angina Equivalent Class 3 symptoms). Pt had an abnormal NST in 9/2018 revealing a small amount of ischemia in the distal infero-lateral wall, EF 61%.  Echo in 4/2018 showed EF 75%, no wall motion abnormalities. Pt denies CP, dizziness, diaphoresis, palpitations, LE edema, orthopnea, PND, syncope    Due to pts risk factors, CCS Angina Class 3 Sx, abnormal NST, pt is referred for cardiac catheterization w/ possible intervention. IV hydration for CKD pre-cath.

## 2018-11-09 ENCOUNTER — INPATIENT (INPATIENT)
Facility: HOSPITAL | Age: 78
LOS: 0 days | Discharge: HOME | DRG: 247 | End: 2018-11-10
Attending: INTERNAL MEDICINE | Admitting: INTERNAL MEDICINE
Payer: MEDICARE

## 2018-11-09 LAB
ALBUMIN SERPL ELPH-MCNC: 4.5 G/DL — SIGNIFICANT CHANGE UP (ref 3.3–5)
ALP SERPL-CCNC: 77 U/L — SIGNIFICANT CHANGE UP (ref 40–120)
ALT FLD-CCNC: 16 U/L — SIGNIFICANT CHANGE UP (ref 10–45)
ANION GAP SERPL CALC-SCNC: 17 MMOL/L — SIGNIFICANT CHANGE UP (ref 5–17)
APTT BLD: 32.8 SEC — SIGNIFICANT CHANGE UP (ref 27.5–36.3)
AST SERPL-CCNC: 24 U/L — SIGNIFICANT CHANGE UP (ref 10–40)
BASOPHILS NFR BLD AUTO: 0.2 % — SIGNIFICANT CHANGE UP (ref 0–2)
BILIRUB SERPL-MCNC: 0.5 MG/DL — SIGNIFICANT CHANGE UP (ref 0.2–1.2)
BUN SERPL-MCNC: 14 MG/DL — SIGNIFICANT CHANGE UP (ref 7–23)
CALCIUM SERPL-MCNC: 9.5 MG/DL — SIGNIFICANT CHANGE UP (ref 8.4–10.5)
CHLORIDE SERPL-SCNC: 94 MMOL/L — LOW (ref 96–108)
CHOLEST SERPL-MCNC: 164 MG/DL — SIGNIFICANT CHANGE UP (ref 10–199)
CK MB CFR SERPL CALC: 6.6 NG/ML — SIGNIFICANT CHANGE UP (ref 0–6.7)
CK SERPL-CCNC: 162 U/L — SIGNIFICANT CHANGE UP (ref 30–200)
CO2 SERPL-SCNC: 26 MMOL/L — SIGNIFICANT CHANGE UP (ref 22–31)
CREAT SERPL-MCNC: 1.33 MG/DL — HIGH (ref 0.5–1.3)
CRP SERPL-MCNC: 0.04 MG/DL — SIGNIFICANT CHANGE UP (ref 0–0.4)
EOSINOPHIL NFR BLD AUTO: 1.3 % — SIGNIFICANT CHANGE UP (ref 0–6)
GLUCOSE BLDC GLUCOMTR-MCNC: 75 MG/DL — SIGNIFICANT CHANGE UP (ref 70–99)
GLUCOSE SERPL-MCNC: 119 MG/DL — HIGH (ref 70–99)
HBA1C BLD-MCNC: 7.1 % — HIGH (ref 4–5.6)
HCT VFR BLD CALC: 47.2 % — SIGNIFICANT CHANGE UP (ref 39–50)
HDLC SERPL-MCNC: 43 MG/DL — SIGNIFICANT CHANGE UP
HGB BLD-MCNC: 16.3 G/DL — SIGNIFICANT CHANGE UP (ref 13–17)
INR BLD: 0.96 — SIGNIFICANT CHANGE UP (ref 0.88–1.16)
LIPID PNL WITH DIRECT LDL SERPL: 95 MG/DL — SIGNIFICANT CHANGE UP
LYMPHOCYTES # BLD AUTO: 31.3 % — SIGNIFICANT CHANGE UP (ref 13–44)
MCHC RBC-ENTMCNC: 30.6 PG — SIGNIFICANT CHANGE UP (ref 27–34)
MCHC RBC-ENTMCNC: 34.5 G/DL — SIGNIFICANT CHANGE UP (ref 32–36)
MCV RBC AUTO: 88.6 FL — SIGNIFICANT CHANGE UP (ref 80–100)
MONOCYTES NFR BLD AUTO: 9.4 % — SIGNIFICANT CHANGE UP (ref 2–14)
NEUTROPHILS NFR BLD AUTO: 57.8 % — SIGNIFICANT CHANGE UP (ref 43–77)
PLATELET # BLD AUTO: 245 K/UL — SIGNIFICANT CHANGE UP (ref 150–400)
POTASSIUM SERPL-MCNC: 3.6 MMOL/L — SIGNIFICANT CHANGE UP (ref 3.5–5.3)
POTASSIUM SERPL-SCNC: 3.6 MMOL/L — SIGNIFICANT CHANGE UP (ref 3.5–5.3)
PROT SERPL-MCNC: 7.6 G/DL — SIGNIFICANT CHANGE UP (ref 6–8.3)
PROTHROM AB SERPL-ACNC: 10.8 SEC — SIGNIFICANT CHANGE UP (ref 10–12.9)
RBC # BLD: 5.33 M/UL — SIGNIFICANT CHANGE UP (ref 4.2–5.8)
RBC # FLD: 12.7 % — SIGNIFICANT CHANGE UP (ref 10.3–16.9)
SODIUM SERPL-SCNC: 137 MMOL/L — SIGNIFICANT CHANGE UP (ref 135–145)
TOTAL CHOLESTEROL/HDL RATIO MEASUREMENT: 3.8 RATIO — SIGNIFICANT CHANGE UP (ref 3.4–9.6)
TRIGL SERPL-MCNC: 128 MG/DL — SIGNIFICANT CHANGE UP (ref 10–149)
WBC # BLD: 8.6 K/UL — SIGNIFICANT CHANGE UP (ref 3.8–10.5)
WBC # FLD AUTO: 8.6 K/UL — SIGNIFICANT CHANGE UP (ref 3.8–10.5)

## 2018-11-09 PROCEDURE — 93458 L HRT ARTERY/VENTRICLE ANGIO: CPT | Mod: 26,XU

## 2018-11-09 PROCEDURE — 93571 IV DOP VEL&/PRESS C FLO 1ST: CPT | Mod: 26

## 2018-11-09 PROCEDURE — 92928 PRQ TCAT PLMT NTRAC ST 1 LES: CPT | Mod: RC

## 2018-11-09 RX ORDER — SODIUM CHLORIDE 9 MG/ML
500 INJECTION, SOLUTION INTRAVENOUS
Qty: 0 | Refills: 0 | Status: DISCONTINUED | OUTPATIENT
Start: 2018-11-09 | End: 2018-11-10

## 2018-11-09 RX ORDER — LOSARTAN POTASSIUM 100 MG/1
1 TABLET, FILM COATED ORAL
Qty: 0 | Refills: 0 | COMMUNITY

## 2018-11-09 RX ORDER — AMLODIPINE BESYLATE 2.5 MG/1
10 TABLET ORAL DAILY
Qty: 0 | Refills: 0 | Status: DISCONTINUED | OUTPATIENT
Start: 2018-11-09 | End: 2018-11-10

## 2018-11-09 RX ORDER — METOPROLOL TARTRATE 50 MG
50 TABLET ORAL DAILY
Qty: 0 | Refills: 0 | Status: DISCONTINUED | OUTPATIENT
Start: 2018-11-09 | End: 2018-11-10

## 2018-11-09 RX ORDER — AMLODIPINE BESYLATE 2.5 MG/1
1 TABLET ORAL
Qty: 0 | Refills: 0 | COMMUNITY

## 2018-11-09 RX ORDER — METOPROLOL TARTRATE 50 MG
1 TABLET ORAL
Qty: 0 | Refills: 0 | COMMUNITY

## 2018-11-09 RX ORDER — ASPIRIN/CALCIUM CARB/MAGNESIUM 324 MG
243 TABLET ORAL ONCE
Qty: 0 | Refills: 0 | Status: COMPLETED | OUTPATIENT
Start: 2018-11-09 | End: 2018-11-09

## 2018-11-09 RX ORDER — ATORVASTATIN CALCIUM 80 MG/1
20 TABLET, FILM COATED ORAL AT BEDTIME
Qty: 0 | Refills: 0 | Status: DISCONTINUED | OUTPATIENT
Start: 2018-11-09 | End: 2018-11-10

## 2018-11-09 RX ORDER — POTASSIUM CHLORIDE 20 MEQ
40 PACKET (EA) ORAL ONCE
Qty: 0 | Refills: 0 | Status: COMPLETED | OUTPATIENT
Start: 2018-11-09 | End: 2018-11-09

## 2018-11-09 RX ORDER — SIMVASTATIN 20 MG/1
10 TABLET, FILM COATED ORAL AT BEDTIME
Qty: 0 | Refills: 0 | Status: DISCONTINUED | OUTPATIENT
Start: 2018-11-09 | End: 2018-11-09

## 2018-11-09 RX ORDER — TICAGRELOR 90 MG/1
90 TABLET ORAL
Qty: 0 | Refills: 0 | Status: DISCONTINUED | OUTPATIENT
Start: 2018-11-09 | End: 2018-11-10

## 2018-11-09 RX ORDER — ASPIRIN/CALCIUM CARB/MAGNESIUM 324 MG
81 TABLET ORAL DAILY
Qty: 0 | Refills: 0 | Status: DISCONTINUED | OUTPATIENT
Start: 2018-11-09 | End: 2018-11-10

## 2018-11-09 RX ORDER — SODIUM CHLORIDE 9 MG/ML
500 INJECTION INTRAMUSCULAR; INTRAVENOUS; SUBCUTANEOUS
Qty: 0 | Refills: 0 | Status: DISCONTINUED | OUTPATIENT
Start: 2018-11-09 | End: 2018-11-09

## 2018-11-09 RX ORDER — TICAGRELOR 90 MG/1
180 TABLET ORAL ONCE
Qty: 0 | Refills: 0 | Status: COMPLETED | OUTPATIENT
Start: 2018-11-09 | End: 2018-11-09

## 2018-11-09 RX ADMIN — TICAGRELOR 180 MILLIGRAM(S): 90 TABLET ORAL at 12:47

## 2018-11-09 RX ADMIN — Medication 243 MILLIGRAM(S): at 12:48

## 2018-11-09 RX ADMIN — TICAGRELOR 90 MILLIGRAM(S): 90 TABLET ORAL at 19:56

## 2018-11-09 RX ADMIN — SODIUM CHLORIDE 50 MILLILITER(S): 9 INJECTION, SOLUTION INTRAVENOUS at 17:17

## 2018-11-09 RX ADMIN — Medication 20 MILLIEQUIVALENT(S): at 12:48

## 2018-11-09 RX ADMIN — SODIUM CHLORIDE 50 MILLILITER(S): 9 INJECTION, SOLUTION INTRAVENOUS at 16:25

## 2018-11-09 RX ADMIN — SODIUM CHLORIDE 50 MILLILITER(S): 9 INJECTION INTRAMUSCULAR; INTRAVENOUS; SUBCUTANEOUS at 12:38

## 2018-11-09 RX ADMIN — ATORVASTATIN CALCIUM 20 MILLIGRAM(S): 80 TABLET, FILM COATED ORAL at 21:44

## 2018-11-09 NOTE — PROGRESS NOTE ADULT - SUBJECTIVE AND OBJECTIVE BOX
Pt is s/p Cardiac Cath   for Hx Of CAD       Pt is s/p PTCA to RCA   80 % lesion to 0   PTCA to LAD  70 % to Zero FFR 0.80   Pt tolerated procedure well    PAST MEDICAL & SURGICAL HISTORY:  Essential hypertension: Hypertension  Type 2 diabetes mellitus: Diabetes  Hypercholesterolemia: Hypercholesteremia  GERD (gastroesophageal reflux disease)  Diabetes mellitus type II  Hyperlipidemia  HTN - Hypertension  Other postprocedural status: S/P hemorrhoidectomy  H/O colonoscopy: 7/12    MEDICATIONS  (STANDING):  amLODIPine   Tablet 10 milliGRAM(s) Oral daily  aspirin enteric coated 81 milliGRAM(s) Oral daily  metoprolol succinate ER 50 milliGRAM(s) Oral daily  simvastatin 10 milliGRAM(s) Oral at bedtime  sodium chloride 0.45%. 500 milliLiter(s) (50 mL/Hr) IV Continuous <Continuous>  ticagrelor 90 milliGRAM(s) Oral two times a day    MEDICATIONS  (PRN):    Vital Signs Last 24 Hrs  T(C): 36.6 (08 Nov 2018 19:29), Max: 36.6 (08 Nov 2018 19:29)  T(F): 97.8 (08 Nov 2018 19:29), Max: 97.8 (08 Nov 2018 19:29)  HR: 68 (08 Nov 2018 19:29) (68 - 68)  BP: 155/82 (08 Nov 2018 19:29) (155/82 - 155/82)  BP(mean): 106 (08 Nov 2018 19:29) (106 - 106)  RR: 16 (08 Nov 2018 19:29) (16 - 16)  SpO2: 98% (08 Nov 2018 19:29) (98% - 98%)    Lungs clear  CV s1 s2   Abd soft    Ext stable                          16.3   8.6   )-----------( 245      ( 09 Nov 2018 10:56 )             47.2   11-09    137  |  94<L>  |  14  ----------------------------<  119<H>  3.6   |  26  |  1.33<H>    Ca    9.5      09 Nov 2018 10:56    TPro  7.6  /  Alb  4.5  /  TBili  0.5  /  DBili  x   /  AST  24  /  ALT  16  /  AlkPhos  77  11-09

## 2018-11-10 ENCOUNTER — TRANSCRIPTION ENCOUNTER (OUTPATIENT)
Age: 78
End: 2018-11-10

## 2018-11-10 VITALS — TEMPERATURE: 98 F

## 2018-11-10 LAB
ANION GAP SERPL CALC-SCNC: 13 MMOL/L — SIGNIFICANT CHANGE UP (ref 5–17)
BUN SERPL-MCNC: 19 MG/DL — SIGNIFICANT CHANGE UP (ref 7–23)
CALCIUM SERPL-MCNC: 9.3 MG/DL — SIGNIFICANT CHANGE UP (ref 8.4–10.5)
CHLORIDE SERPL-SCNC: 96 MMOL/L — SIGNIFICANT CHANGE UP (ref 96–108)
CO2 SERPL-SCNC: 27 MMOL/L — SIGNIFICANT CHANGE UP (ref 22–31)
CREAT SERPL-MCNC: 1.45 MG/DL — HIGH (ref 0.5–1.3)
GLUCOSE BLDC GLUCOMTR-MCNC: 124 MG/DL — HIGH (ref 70–99)
GLUCOSE BLDC GLUCOMTR-MCNC: 189 MG/DL — HIGH (ref 70–99)
GLUCOSE SERPL-MCNC: 126 MG/DL — HIGH (ref 70–99)
HCT VFR BLD CALC: 46.5 % — SIGNIFICANT CHANGE UP (ref 39–50)
HGB BLD-MCNC: 16 G/DL — SIGNIFICANT CHANGE UP (ref 13–17)
MAGNESIUM SERPL-MCNC: 2.1 MG/DL — SIGNIFICANT CHANGE UP (ref 1.6–2.6)
MCHC RBC-ENTMCNC: 30.7 PG — SIGNIFICANT CHANGE UP (ref 27–34)
MCHC RBC-ENTMCNC: 34.4 G/DL — SIGNIFICANT CHANGE UP (ref 32–36)
MCV RBC AUTO: 89.1 FL — SIGNIFICANT CHANGE UP (ref 80–100)
PLATELET # BLD AUTO: 240 K/UL — SIGNIFICANT CHANGE UP (ref 150–400)
POTASSIUM SERPL-MCNC: 3.6 MMOL/L — SIGNIFICANT CHANGE UP (ref 3.5–5.3)
POTASSIUM SERPL-SCNC: 3.6 MMOL/L — SIGNIFICANT CHANGE UP (ref 3.5–5.3)
RBC # BLD: 5.22 M/UL — SIGNIFICANT CHANGE UP (ref 4.2–5.8)
RBC # FLD: 12.6 % — SIGNIFICANT CHANGE UP (ref 10.3–16.9)
SODIUM SERPL-SCNC: 136 MMOL/L — SIGNIFICANT CHANGE UP (ref 135–145)
WBC # BLD: 9.6 K/UL — SIGNIFICANT CHANGE UP (ref 3.8–10.5)
WBC # FLD AUTO: 9.6 K/UL — SIGNIFICANT CHANGE UP (ref 3.8–10.5)

## 2018-11-10 RX ORDER — INSULIN LISPRO 100/ML
VIAL (ML) SUBCUTANEOUS
Qty: 0 | Refills: 0 | Status: DISCONTINUED | OUTPATIENT
Start: 2018-11-10 | End: 2018-11-10

## 2018-11-10 RX ORDER — DEXTROSE 50 % IN WATER 50 %
25 SYRINGE (ML) INTRAVENOUS ONCE
Qty: 0 | Refills: 0 | Status: DISCONTINUED | OUTPATIENT
Start: 2018-11-10 | End: 2018-11-10

## 2018-11-10 RX ORDER — SIMVASTATIN 20 MG/1
1 TABLET, FILM COATED ORAL
Qty: 30 | Refills: 3 | OUTPATIENT
Start: 2018-11-10 | End: 2019-03-09

## 2018-11-10 RX ORDER — GLUCAGON INJECTION, SOLUTION 0.5 MG/.1ML
1 INJECTION, SOLUTION SUBCUTANEOUS ONCE
Qty: 0 | Refills: 0 | Status: DISCONTINUED | OUTPATIENT
Start: 2018-11-10 | End: 2018-11-10

## 2018-11-10 RX ORDER — DEXTROSE 50 % IN WATER 50 %
15 SYRINGE (ML) INTRAVENOUS ONCE
Qty: 0 | Refills: 0 | Status: DISCONTINUED | OUTPATIENT
Start: 2018-11-10 | End: 2018-11-10

## 2018-11-10 RX ORDER — ASPIRIN/CALCIUM CARB/MAGNESIUM 324 MG
1 TABLET ORAL
Qty: 30 | Refills: 11 | OUTPATIENT
Start: 2018-11-10 | End: 2019-11-04

## 2018-11-10 RX ORDER — TICAGRELOR 90 MG/1
1 TABLET ORAL
Qty: 60 | Refills: 11 | OUTPATIENT
Start: 2018-11-10 | End: 2019-11-04

## 2018-11-10 RX ORDER — DEXTROSE 50 % IN WATER 50 %
12.5 SYRINGE (ML) INTRAVENOUS ONCE
Qty: 0 | Refills: 0 | Status: DISCONTINUED | OUTPATIENT
Start: 2018-11-10 | End: 2018-11-10

## 2018-11-10 RX ORDER — SIMVASTATIN 20 MG/1
1 TABLET, FILM COATED ORAL
Qty: 0 | Refills: 0 | COMMUNITY

## 2018-11-10 RX ORDER — ASPIRIN/CALCIUM CARB/MAGNESIUM 324 MG
1 TABLET ORAL
Qty: 0 | Refills: 0 | COMMUNITY

## 2018-11-10 RX ORDER — SODIUM CHLORIDE 9 MG/ML
1000 INJECTION, SOLUTION INTRAVENOUS
Qty: 0 | Refills: 0 | Status: DISCONTINUED | OUTPATIENT
Start: 2018-11-10 | End: 2018-11-10

## 2018-11-10 RX ORDER — ATORVASTATIN CALCIUM 80 MG/1
1 TABLET, FILM COATED ORAL
Qty: 30 | Refills: 3
Start: 2018-11-10 | End: 2019-03-09

## 2018-11-10 RX ADMIN — Medication 50 MILLIGRAM(S): at 06:51

## 2018-11-10 RX ADMIN — AMLODIPINE BESYLATE 10 MILLIGRAM(S): 2.5 TABLET ORAL at 06:51

## 2018-11-10 RX ADMIN — Medication 1: at 11:37

## 2018-11-10 RX ADMIN — TICAGRELOR 90 MILLIGRAM(S): 90 TABLET ORAL at 06:52

## 2018-11-10 RX ADMIN — Medication 81 MILLIGRAM(S): at 11:37

## 2018-11-10 NOTE — DISCHARGE NOTE ADULT - CARE PROVIDER_API CALL
Flavio Fisher (MD), Cardiovascular Disease; Interventional Cardiology  [address] [place] [address]  Phone: [phone]  Fax: [phone]

## 2018-11-10 NOTE — DISCHARGE NOTE ADULT - PATIENT PORTAL LINK FT
You can access the [facility] Patient Portal, offered by [facility], by registering with the following website: [email or web address]

## 2018-11-10 NOTE — DISCHARGE NOTE ADULT - HOSPITAL COURSE
77 y/o Romansh/Limited English Speaking M w/ FMHX CAD, PMHX HTN, HLD, DM, CAD s/p prior PCI at [facility] in 5/2015 (JOSH OM1, EDP 8), CKD stage 3 (Cr. 1.4-1.5 in 6/2018), h/o lower GI bleed in 2012 (s/p colonoscopy w/ polyp removal and s/p rectal tumor resection/hemorroidectomy that admission, denies any GI bleed since then, denies h/o EGD), Herniated nucleus pulposus and Spinal Stenosis L4-5 and Chronic Back Pain resulting in hospitalization at [facility] in 6/2018 treated w/ steroids (did not have laminectomy/discectomy), who presented to their cardiologist c/o KEATING on ambulating 2 blocks (CCS Angina Equivalent Class 3 symptoms). Pt had an abnormal NST in 9/2018 revealing a small amount of ischemia in the distal infero-lateral wall, EF 61%.  Echo in 4/2018 showed EF 75%, no wall motion abnormalities. Pt denies CP, dizziness, diaphoresis, palpitations, LE edema, orthopnea, PND, syncope. Due to pts risk factors, CCS Angina Class 3 Sx, abnormal NST, pt was referred for cardiac catheterization w/ possible intervention. IV hydration for CKD pre-cath. Patient underwent cardiac cath on 11/9/18 and received PTCA/JOSH proxRCA and PTCA/JOSH proxLAD, right radial access. Patient to be discharged on ASA/brilinta/lipitor. Patient was seen and examined this AM and was asymptomatic without complaints. Patient's VSS, labs and tele reviewed. Patient is stable for discharge per Dr. Juarez. Patient has been given appropriate discharge instructions including medication regimen, access site management and follow up. All meds e-prescribed, brilinta covered by ins.    Temp 98.1F, HR 69bpm, /74, RR 16, O2 sat 96% RA  Gen: NAD, A&O x 3  Cards: RRR, clear S1 and S2 without murmur  Pulm: CTA b/l without w/r/r  Abd: BS x 4, soft, NT  Ext: No LE edema or ulcerations b/l

## 2018-11-10 NOTE — DISCHARGE NOTE ADULT - CARE PLAN
Principal Discharge DX:	Coronary artery disease  Goal:	continue medications, follow up  Assessment and plan of treatment:	You underwent a cardiac angiogram and received a stent to the right coronary artery and the left anterior descending artery. PLEASE CONTINUE ASPIRIN 81MG DAILY AND BRILINTA 90MG TWICE DAILY. DO NOT STOP THESE MEDICATIONS FOR ANY REASON AS THEY ARE KEEPING YOUR STENT OPEN AND PREVENTING A HEART ATTACK. Avoid strenuous activity or heavy lifting for the next five days. Do not take a bath or swim for the next five days; you may shower. For any bleeding or hematoma formation (hardened blood collection under the skin) at the access site of right wrist please hold pressure and go to the emergency room. Please follow up with Dr. Fisher in 1-2 weeks. For recurrent chest pain, please call your doctor or go to the emergency room.  Secondary Diagnosis:	HTN (hypertension)  Goal:	continue medications  Assessment and plan of treatment:	Please continue medications as listed to keep your blood pressure controlled. For blood pressure that is too high or too low please see your doctor or go to the emergency room as necessary.  Secondary Diagnosis:	Hyperlipidemia  Goal:	continue medications  Assessment and plan of treatment:	Please continue lipitor to keep your cholesterol low. High cholesterol contributes to heart disease.  Secondary Diagnosis:	Diabetes  Goal:	continue medications

## 2018-11-10 NOTE — DISCHARGE NOTE ADULT - MEDICATION SUMMARY - MEDICATIONS TO TAKE
I will START or STAY ON the medications listed below when I get home from the hospital:    Aspir 81 oral delayed release tablet  -- 1 tab(s) by mouth once a day  -- Indication: For Coronary artery disease, keeps stent open    losartan 50 mg oral tablet  -- 1 tab(s) by mouth once a day  -- Indication: For high blood pressure    glimepiride 1 mg oral tablet  -- 1 tab(s) by mouth once a day  -- Indication: For diabetes    atorvastatin 20 mg oral tablet  -- 1 tab(s) by mouth once a day (at bedtime)  -- Indication: For high cholesterol    ticagrelor 90 mg oral tablet  -- 1 tab(s) by mouth 2 times a day  -- Indication: For Coronary artery disease, keeps stent open    metoprolol succinate 50 mg oral tablet, extended release  -- 1 tab(s) by mouth once a day  -- Indication: For high blood pressure, protects heart    amLODIPine 10 mg oral tablet  -- 1 tab(s) by mouth once a day  -- Indication: For high blood pressure

## 2018-11-10 NOTE — DISCHARGE NOTE ADULT - PLAN OF CARE
continue medications, follow up You underwent a cardiac angiogram and received a stent to the right coronary artery and the left anterior descending artery. PLEASE CONTINUE ASPIRIN 81MG DAILY AND BRILINTA 90MG TWICE DAILY. DO NOT STOP THESE MEDICATIONS FOR ANY REASON AS THEY ARE KEEPING YOUR STENT OPEN AND PREVENTING A HEART ATTACK. Avoid strenuous activity or heavy lifting for the next five days. Do not take a bath or swim for the next five days; you may shower. For any bleeding or hematoma formation (hardened blood collection under the skin) at the access site of right wrist please hold pressure and go to the emergency room. Please follow up with Dr. Fisher in 1-2 weeks. For recurrent chest pain, please call your doctor or go to the emergency room. continue medications Please continue medications as listed to keep your blood pressure controlled. For blood pressure that is too high or too low please see your doctor or go to the emergency room as necessary. Please continue lipitor to keep your cholesterol low. High cholesterol contributes to heart disease.

## 2018-11-16 DIAGNOSIS — I12.9 HYPERTENSIVE CHRONIC KIDNEY DISEASE WITH STAGE 1 THROUGH STAGE 4 CHRONIC KIDNEY DISEASE, OR UNSPECIFIED CHRONIC KIDNEY DISEASE: ICD-10-CM

## 2018-11-16 DIAGNOSIS — Z83.3 FAMILY HISTORY OF DIABETES MELLITUS: ICD-10-CM

## 2018-11-16 DIAGNOSIS — E11.22 TYPE 2 DIABETES MELLITUS WITH DIABETIC CHRONIC KIDNEY DISEASE: ICD-10-CM

## 2018-11-16 DIAGNOSIS — E78.5 HYPERLIPIDEMIA, UNSPECIFIED: ICD-10-CM

## 2018-11-16 DIAGNOSIS — I25.119 ATHEROSCLEROTIC HEART DISEASE OF NATIVE CORONARY ARTERY WITH UNSPECIFIED ANGINA PECTORIS: ICD-10-CM

## 2018-11-16 DIAGNOSIS — M48.061 SPINAL STENOSIS, LUMBAR REGION WITHOUT NEUROGENIC CLAUDICATION: ICD-10-CM

## 2018-11-16 DIAGNOSIS — N18.3 CHRONIC KIDNEY DISEASE, STAGE 3 (MODERATE): ICD-10-CM

## 2018-11-16 DIAGNOSIS — Z82.49 FAMILY HISTORY OF ISCHEMIC HEART DISEASE AND OTHER DISEASES OF THE CIRCULATORY SYSTEM: ICD-10-CM

## 2018-11-16 DIAGNOSIS — Z79.82 LONG TERM (CURRENT) USE OF ASPIRIN: ICD-10-CM

## 2018-11-16 DIAGNOSIS — Z95.5 PRESENCE OF CORONARY ANGIOPLASTY IMPLANT AND GRAFT: ICD-10-CM

## 2018-11-16 DIAGNOSIS — K21.9 GASTRO-ESOPHAGEAL REFLUX DISEASE WITHOUT ESOPHAGITIS: ICD-10-CM

## 2018-11-16 DIAGNOSIS — E78.00 PURE HYPERCHOLESTEROLEMIA, UNSPECIFIED: ICD-10-CM

## 2018-11-24 PROCEDURE — C1725: CPT

## 2018-11-24 PROCEDURE — 80048 BASIC METABOLIC PNL TOTAL CA: CPT

## 2018-11-24 PROCEDURE — 80053 COMPREHEN METABOLIC PANEL: CPT

## 2018-11-24 PROCEDURE — 85027 COMPLETE CBC AUTOMATED: CPT

## 2018-11-24 PROCEDURE — 82553 CREATINE MB FRACTION: CPT

## 2018-11-24 PROCEDURE — 86140 C-REACTIVE PROTEIN: CPT

## 2018-11-24 PROCEDURE — 82550 ASSAY OF CK (CPK): CPT

## 2018-11-24 PROCEDURE — 83036 HEMOGLOBIN GLYCOSYLATED A1C: CPT

## 2018-11-24 PROCEDURE — C1894: CPT

## 2018-11-24 PROCEDURE — C1769: CPT

## 2018-11-24 PROCEDURE — 36415 COLL VENOUS BLD VENIPUNCTURE: CPT

## 2018-11-24 PROCEDURE — 82962 GLUCOSE BLOOD TEST: CPT

## 2018-11-24 PROCEDURE — 83735 ASSAY OF MAGNESIUM: CPT

## 2018-11-24 PROCEDURE — C1887: CPT

## 2018-11-24 PROCEDURE — 85610 PROTHROMBIN TIME: CPT

## 2018-11-24 PROCEDURE — 85025 COMPLETE CBC W/AUTO DIFF WBC: CPT

## 2018-11-24 PROCEDURE — C1874: CPT

## 2018-11-24 PROCEDURE — 85730 THROMBOPLASTIN TIME PARTIAL: CPT

## 2018-11-24 PROCEDURE — 80061 LIPID PANEL: CPT

## 2019-04-05 ENCOUNTER — APPOINTMENT (OUTPATIENT)
Dept: NEUROLOGY | Facility: CLINIC | Age: 79
End: 2019-04-05

## 2019-05-01 ENCOUNTER — OUTPATIENT (OUTPATIENT)
Dept: OUTPATIENT SERVICES | Facility: HOSPITAL | Age: 79
LOS: 1 days | End: 2019-05-01
Payer: MEDICARE

## 2019-05-01 PROCEDURE — G9001: CPT

## 2019-05-22 DIAGNOSIS — Z71.89 OTHER SPECIFIED COUNSELING: ICD-10-CM

## 2019-09-11 ENCOUNTER — INPATIENT (INPATIENT)
Facility: HOSPITAL | Age: 79
LOS: 1 days | Discharge: HOME | End: 2019-09-13
Attending: INTERNAL MEDICINE | Admitting: INTERNAL MEDICINE
Payer: MEDICARE

## 2019-09-11 VITALS
SYSTOLIC BLOOD PRESSURE: 135 MMHG | RESPIRATION RATE: 19 BRPM | OXYGEN SATURATION: 95 % | HEIGHT: 64 IN | WEIGHT: 149.91 LBS | DIASTOLIC BLOOD PRESSURE: 73 MMHG | HEART RATE: 78 BPM | TEMPERATURE: 98 F

## 2019-09-11 DIAGNOSIS — R55 SYNCOPE AND COLLAPSE: ICD-10-CM

## 2019-09-11 DIAGNOSIS — E78.00 PURE HYPERCHOLESTEROLEMIA, UNSPECIFIED: ICD-10-CM

## 2019-09-11 DIAGNOSIS — E78.49 OTHER HYPERLIPIDEMIA: ICD-10-CM

## 2019-09-11 DIAGNOSIS — E11.9 TYPE 2 DIABETES MELLITUS WITHOUT COMPLICATIONS: ICD-10-CM

## 2019-09-11 DIAGNOSIS — I10 ESSENTIAL (PRIMARY) HYPERTENSION: ICD-10-CM

## 2019-09-11 DIAGNOSIS — E87.6 HYPOKALEMIA: ICD-10-CM

## 2019-09-11 LAB
ALBUMIN SERPL ELPH-MCNC: 3.5 G/DL — SIGNIFICANT CHANGE UP (ref 3.3–5)
ALP SERPL-CCNC: 67 U/L — SIGNIFICANT CHANGE UP (ref 40–120)
ALT FLD-CCNC: 19 U/L — SIGNIFICANT CHANGE UP (ref 12–78)
ANION GAP SERPL CALC-SCNC: 9 MMOL/L — SIGNIFICANT CHANGE UP (ref 5–17)
APPEARANCE UR: CLEAR — SIGNIFICANT CHANGE UP
APTT BLD: 25.7 SEC — LOW (ref 27.5–36.3)
AST SERPL-CCNC: 14 U/L — LOW (ref 15–37)
BACTERIA # UR AUTO: ABNORMAL
BASOPHILS # BLD AUTO: 0.04 K/UL — SIGNIFICANT CHANGE UP (ref 0–0.2)
BASOPHILS NFR BLD AUTO: 0.4 % — SIGNIFICANT CHANGE UP (ref 0–2)
BILIRUB SERPL-MCNC: 0.6 MG/DL — SIGNIFICANT CHANGE UP (ref 0.2–1.2)
BILIRUB UR-MCNC: NEGATIVE — SIGNIFICANT CHANGE UP
BUN SERPL-MCNC: 9 MG/DL — SIGNIFICANT CHANGE UP (ref 7–23)
CALCIUM SERPL-MCNC: 8.1 MG/DL — LOW (ref 8.5–10.1)
CHLORIDE SERPL-SCNC: 105 MMOL/L — SIGNIFICANT CHANGE UP (ref 96–108)
CK MB BLD-MCNC: 2.3 % — SIGNIFICANT CHANGE UP (ref 0–3.5)
CK MB CFR SERPL CALC: 2.9 NG/ML — SIGNIFICANT CHANGE UP (ref 0.5–3.6)
CK SERPL-CCNC: 124 U/L — SIGNIFICANT CHANGE UP (ref 26–308)
CO2 SERPL-SCNC: 27 MMOL/L — SIGNIFICANT CHANGE UP (ref 22–31)
COLOR SPEC: YELLOW — SIGNIFICANT CHANGE UP
COMMENT - URINE: SIGNIFICANT CHANGE UP
CREAT SERPL-MCNC: 1.65 MG/DL — HIGH (ref 0.5–1.3)
D DIMER BLD IA.RAPID-MCNC: <150 NG/ML DDU — SIGNIFICANT CHANGE UP
DIFF PNL FLD: NEGATIVE — SIGNIFICANT CHANGE UP
EOSINOPHIL # BLD AUTO: 0.15 K/UL — SIGNIFICANT CHANGE UP (ref 0–0.5)
EOSINOPHIL NFR BLD AUTO: 1.3 % — SIGNIFICANT CHANGE UP (ref 0–6)
GLUCOSE BLDC GLUCOMTR-MCNC: 140 MG/DL — HIGH (ref 70–99)
GLUCOSE BLDC GLUCOMTR-MCNC: 158 MG/DL — HIGH (ref 70–99)
GLUCOSE BLDC GLUCOMTR-MCNC: 94 MG/DL — SIGNIFICANT CHANGE UP (ref 70–99)
GLUCOSE SERPL-MCNC: 189 MG/DL — HIGH (ref 70–99)
GLUCOSE UR QL: 100 MG/DL
HCT VFR BLD CALC: 43.9 % — SIGNIFICANT CHANGE UP (ref 39–50)
HGB BLD-MCNC: 15 G/DL — SIGNIFICANT CHANGE UP (ref 13–17)
IMM GRANULOCYTES NFR BLD AUTO: 0.4 % — SIGNIFICANT CHANGE UP (ref 0–1.5)
INR BLD: 0.98 RATIO — SIGNIFICANT CHANGE UP (ref 0.88–1.16)
KETONES UR-MCNC: NEGATIVE — SIGNIFICANT CHANGE UP
LEUKOCYTE ESTERASE UR-ACNC: NEGATIVE — SIGNIFICANT CHANGE UP
LYMPHOCYTES # BLD AUTO: 1.56 K/UL — SIGNIFICANT CHANGE UP (ref 1–3.3)
LYMPHOCYTES # BLD AUTO: 13.8 % — SIGNIFICANT CHANGE UP (ref 13–44)
MCHC RBC-ENTMCNC: 30.7 PG — SIGNIFICANT CHANGE UP (ref 27–34)
MCHC RBC-ENTMCNC: 34.2 GM/DL — SIGNIFICANT CHANGE UP (ref 32–36)
MCV RBC AUTO: 90 FL — SIGNIFICANT CHANGE UP (ref 80–100)
MONOCYTES # BLD AUTO: 1.01 K/UL — HIGH (ref 0–0.9)
MONOCYTES NFR BLD AUTO: 8.9 % — SIGNIFICANT CHANGE UP (ref 2–14)
NEUTROPHILS # BLD AUTO: 8.52 K/UL — HIGH (ref 1.8–7.4)
NEUTROPHILS NFR BLD AUTO: 75.2 % — SIGNIFICANT CHANGE UP (ref 43–77)
NITRITE UR-MCNC: NEGATIVE — SIGNIFICANT CHANGE UP
NRBC # BLD: 0 /100 WBCS — SIGNIFICANT CHANGE UP (ref 0–0)
PH UR: 8 — SIGNIFICANT CHANGE UP (ref 5–8)
PLATELET # BLD AUTO: 279 K/UL — SIGNIFICANT CHANGE UP (ref 150–400)
POTASSIUM SERPL-MCNC: 2.8 MMOL/L — CRITICAL LOW (ref 3.5–5.3)
POTASSIUM SERPL-SCNC: 2.8 MMOL/L — CRITICAL LOW (ref 3.5–5.3)
PROT SERPL-MCNC: 7.4 GM/DL — SIGNIFICANT CHANGE UP (ref 6–8.3)
PROT UR-MCNC: 30 MG/DL
PROTHROM AB SERPL-ACNC: 11 SEC — SIGNIFICANT CHANGE UP (ref 10–12.9)
RBC # BLD: 4.88 M/UL — SIGNIFICANT CHANGE UP (ref 4.2–5.8)
RBC # FLD: 13.6 % — SIGNIFICANT CHANGE UP (ref 10.3–14.5)
SODIUM SERPL-SCNC: 141 MMOL/L — SIGNIFICANT CHANGE UP (ref 135–145)
SP GR SPEC: 1.01 — SIGNIFICANT CHANGE UP (ref 1.01–1.02)
TROPONIN I SERPL-MCNC: <.015 NG/ML — SIGNIFICANT CHANGE UP (ref 0.01–0.04)
UROBILINOGEN FLD QL: NEGATIVE MG/DL — SIGNIFICANT CHANGE UP
WBC # BLD: 11.32 K/UL — HIGH (ref 3.8–10.5)
WBC # FLD AUTO: 11.32 K/UL — HIGH (ref 3.8–10.5)

## 2019-09-11 PROCEDURE — 71045 X-RAY EXAM CHEST 1 VIEW: CPT | Mod: 26

## 2019-09-11 PROCEDURE — 99285 EMERGENCY DEPT VISIT HI MDM: CPT

## 2019-09-11 PROCEDURE — 99223 1ST HOSP IP/OBS HIGH 75: CPT | Mod: AI

## 2019-09-11 PROCEDURE — 70450 CT HEAD/BRAIN W/O DYE: CPT | Mod: 26

## 2019-09-11 PROCEDURE — 93010 ELECTROCARDIOGRAM REPORT: CPT

## 2019-09-11 RX ORDER — SODIUM CHLORIDE 9 MG/ML
1000 INJECTION, SOLUTION INTRAVENOUS
Refills: 0 | Status: DISCONTINUED | OUTPATIENT
Start: 2019-09-11 | End: 2019-09-13

## 2019-09-11 RX ORDER — SODIUM CHLORIDE 9 MG/ML
500 INJECTION INTRAMUSCULAR; INTRAVENOUS; SUBCUTANEOUS ONCE
Refills: 0 | Status: COMPLETED | OUTPATIENT
Start: 2019-09-11 | End: 2019-09-11

## 2019-09-11 RX ORDER — TICAGRELOR 90 MG/1
90 TABLET ORAL EVERY 12 HOURS
Refills: 0 | Status: DISCONTINUED | OUTPATIENT
Start: 2019-09-11 | End: 2019-09-13

## 2019-09-11 RX ORDER — POTASSIUM CHLORIDE 20 MEQ
40 PACKET (EA) ORAL ONCE
Refills: 0 | Status: COMPLETED | OUTPATIENT
Start: 2019-09-11 | End: 2019-09-11

## 2019-09-11 RX ORDER — DEXTROSE 50 % IN WATER 50 %
25 SYRINGE (ML) INTRAVENOUS ONCE
Refills: 0 | Status: DISCONTINUED | OUTPATIENT
Start: 2019-09-11 | End: 2019-09-13

## 2019-09-11 RX ORDER — DEXTROSE 50 % IN WATER 50 %
15 SYRINGE (ML) INTRAVENOUS ONCE
Refills: 0 | Status: DISCONTINUED | OUTPATIENT
Start: 2019-09-11 | End: 2019-09-13

## 2019-09-11 RX ORDER — ATORVASTATIN CALCIUM 80 MG/1
20 TABLET, FILM COATED ORAL AT BEDTIME
Refills: 0 | Status: DISCONTINUED | OUTPATIENT
Start: 2019-09-11 | End: 2019-09-13

## 2019-09-11 RX ORDER — AMLODIPINE BESYLATE 2.5 MG/1
10 TABLET ORAL DAILY
Refills: 0 | Status: DISCONTINUED | OUTPATIENT
Start: 2019-09-11 | End: 2019-09-13

## 2019-09-11 RX ORDER — INSULIN LISPRO 100/ML
VIAL (ML) SUBCUTANEOUS
Refills: 0 | Status: DISCONTINUED | OUTPATIENT
Start: 2019-09-11 | End: 2019-09-13

## 2019-09-11 RX ORDER — POTASSIUM CHLORIDE 20 MEQ
10 PACKET (EA) ORAL ONCE
Refills: 0 | Status: COMPLETED | OUTPATIENT
Start: 2019-09-11 | End: 2019-09-11

## 2019-09-11 RX ORDER — DEXTROSE 50 % IN WATER 50 %
12.5 SYRINGE (ML) INTRAVENOUS ONCE
Refills: 0 | Status: DISCONTINUED | OUTPATIENT
Start: 2019-09-11 | End: 2019-09-13

## 2019-09-11 RX ORDER — ASPIRIN/CALCIUM CARB/MAGNESIUM 324 MG
81 TABLET ORAL DAILY
Refills: 0 | Status: DISCONTINUED | OUTPATIENT
Start: 2019-09-11 | End: 2019-09-13

## 2019-09-11 RX ORDER — METOPROLOL TARTRATE 50 MG
50 TABLET ORAL DAILY
Refills: 0 | Status: DISCONTINUED | OUTPATIENT
Start: 2019-09-11 | End: 2019-09-13

## 2019-09-11 RX ORDER — LOSARTAN POTASSIUM 100 MG/1
50 TABLET, FILM COATED ORAL DAILY
Refills: 0 | Status: DISCONTINUED | OUTPATIENT
Start: 2019-09-11 | End: 2019-09-13

## 2019-09-11 RX ORDER — INSULIN LISPRO 100/ML
VIAL (ML) SUBCUTANEOUS AT BEDTIME
Refills: 0 | Status: DISCONTINUED | OUTPATIENT
Start: 2019-09-11 | End: 2019-09-13

## 2019-09-11 RX ORDER — INFLUENZA VIRUS VACCINE 15; 15; 15; 15 UG/.5ML; UG/.5ML; UG/.5ML; UG/.5ML
0.5 SUSPENSION INTRAMUSCULAR ONCE
Refills: 0 | Status: DISCONTINUED | OUTPATIENT
Start: 2019-09-11 | End: 2019-09-13

## 2019-09-11 RX ORDER — GLUCAGON INJECTION, SOLUTION 0.5 MG/.1ML
1 INJECTION, SOLUTION SUBCUTANEOUS ONCE
Refills: 0 | Status: DISCONTINUED | OUTPATIENT
Start: 2019-09-11 | End: 2019-09-13

## 2019-09-11 RX ADMIN — SODIUM CHLORIDE 500 MILLILITER(S): 9 INJECTION INTRAMUSCULAR; INTRAVENOUS; SUBCUTANEOUS at 13:37

## 2019-09-11 RX ADMIN — SODIUM CHLORIDE 500 MILLILITER(S): 9 INJECTION INTRAMUSCULAR; INTRAVENOUS; SUBCUTANEOUS at 14:39

## 2019-09-11 RX ADMIN — TICAGRELOR 90 MILLIGRAM(S): 90 TABLET ORAL at 20:59

## 2019-09-11 RX ADMIN — Medication 40 MILLIEQUIVALENT(S): at 14:55

## 2019-09-11 RX ADMIN — ATORVASTATIN CALCIUM 20 MILLIGRAM(S): 80 TABLET, FILM COATED ORAL at 22:32

## 2019-09-11 RX ADMIN — Medication 100 MILLIEQUIVALENT(S): at 14:55

## 2019-09-11 NOTE — ED ADULT NURSE NOTE - NSIMPLEMENTINTERV_GEN_ALL_ED
Implemented All Fall Risk Interventions:  [place] to call system. Call bell, personal items and telephone within reach. Instruct patient to call for assistance. Room bathroom lighting operational. Non-slip footwear when patient is off stretcher. Physically safe environment: no spills, clutter or unnecessary equipment. Stretcher in lowest position, wheels locked, appropriate side rails in place. Provide visual cue, wrist band, yellow gown, etc. Monitor gait and stability. Monitor for mental status changes and reorient to person, place, and time. Review medications for side effects contributing to fall risk. Reinforce activity limits and safety measures with patient and family.

## 2019-09-11 NOTE — H&P ADULT - ASSESSMENT
79m with history of syncope has had precvious attacks of syncope in the past with hypokalemia and probable dehydration with minimally elevated cr - he was walking outside when this happened ans will get card consult       IMPROVE VTE Individual Risk Assessment          RISK                                                          Points  [  ] Previous VTE                                                3  [  ] Thrombophilia                                             2  [  ] Lower limb paralysis                                   2        (unable to hold up >15 seconds)    [  ] Current Cancer                                             2         (within 6 months)  [  ] Immobilization > 24 hrs                              1  [  ] ICU/CCU stay > 24 hours                             1  [  ] Age > 60                                                         1    IMPROVE VTE Score:         [ 2       ]    Total Risk Factor Score:    0 - 1:   Consider IPC  >2 - 3:  Thromboprophylaxis required (enoxaparin or SQ heparin)        >4:   High Risk: Thromboprophylaxis required (enoxaparin or SQ heparin), optional add IPC  **If CONTRAINDICATION to enoxaparin or SQ heparin, USE IPCs**

## 2019-09-11 NOTE — H&P ADULT - NSICDXPASTMEDICALHX_GEN_ALL_CORE_FT
PAST MEDICAL HISTORY:  Diabetes mellitus type II     Essential hypertension Hypertension    GERD (gastroesophageal reflux disease)     HTN - Hypertension     Hypercholesterolemia Hypercholesteremia    Hyperlipidemia     Type 2 diabetes mellitus Diabetes

## 2019-09-11 NOTE — H&P ADULT - NSHPPHYSICALEXAM_GEN_ALL_CORE
ICU Vital Signs Last 24 Hrs  T(C): 36.4 (11 Sep 2019 15:57), Max: 36.7 (11 Sep 2019 11:53)  T(F): 97.6 (11 Sep 2019 15:57), Max: 98.1 (11 Sep 2019 11:53)  HR: 82 (11 Sep 2019 15:57) (78 - 82)  BP: 141/73 (11 Sep 2019 15:57) (135/73 - 141/73)  BP(mean): --  ABP: --  ABP(mean): --  RR: 20 (11 Sep 2019 15:57) (19 - 20)  SpO2: 94% (11 Sep 2019 15:57) (94% - 95%)  GENERAL: NAD well-developed  HEAD:  Atraumatic, Normocephalic  EYES: EOMI, PERRLA, conjunctiva and sclera clear  ENMT: No tonsillar erythema, exudates, or enlargement; Moist mucous membranes, Good dentition, No lesions  NECK: Supple, No JVD, Normal thyroid  NERVOUS SYSTEM:  Alert & Oriented X3, Good concentration; Motor Strength 5/5 B/L upper and lower extremities; DTRs 2+ intact and symmetric  CHEST/LUNG: Clear to percussion bilaterally; No rales, rhonchi, wheezing, or rubs  HEART: Regular rate and rhythm; No murmurs, rubs, or gallops  ABDOMEN: Soft, Nontender, Nondistended; Bowel sounds present  EXTREMITIES:  2+ Peripheral Pulses, No clubbing, cyanosis, or edema  LYMPH: No lymphadenopathy   SKIN: No rashes or lesions

## 2019-09-11 NOTE — H&P ADULT - NSICDXFAMILYHX_GEN_ALL_CORE_FT
FAMILY HISTORY:  Father  Still living? Unknown  Family history of cardiovascular disease, Age at diagnosis: Age Unknown  Family history of diabetes mellitus, Age at diagnosis: Age Unknown    Mother  Still living? Unknown  Family history of cardiovascular disease, Age at diagnosis: Age Unknown  Family history of diabetes mellitus, Age at diagnosis: Age Unknown    Sibling  Still living? Unknown  Family history of cardiovascular disease, Age at diagnosis: Age Unknown  Family history of diabetes mellitus, Age at diagnosis: Age Unknown    Child  Still living? Unknown  Family history of cardiovascular disease, Age at diagnosis: Age Unknown  Family history of diabetes mellitus, Age at diagnosis: Age Unknown    Grandparent  Still living? Unknown  Family history of cardiovascular disease, Age at diagnosis: Age Unknown  Family history of diabetes mellitus, Age at diagnosis: Age Unknown    Aunt  Still living? Unknown  Family history of cardiovascular disease, Age at diagnosis: Age Unknown  Family history of diabetes mellitus, Age at diagnosis: Age Unknown

## 2019-09-11 NOTE — ED ADULT TRIAGE NOTE - CHIEF COMPLAINT QUOTE
patient  BIBA , as per patient he had an episode of syncope today while walking , denied fall denied hitting head , no LOC , patient denied  chest pain denied headache denied dizziness denied difficulty breathing , moving all extremities no facial droop clear speech at the time  of triage

## 2019-09-11 NOTE — ED PROVIDER NOTE - NONTENDER LOCATION
left upper quadrant/right costovertebral angle/periumbilical/right upper quadrant/suprapubic/left lower quadrant/right lower quadrant/umbilical/left costovertebral angle

## 2019-09-11 NOTE — H&P ADULT - HISTORY OF PRESENT ILLNESS
79 years old male by ems with son and wife c/o pt was dizziness and past out  did not hit his head for one minute while sitting down at the step at 11:00 am. Son sts pt is back to his  base line. Pt is alert and oriented x 3 denies headache, dizziness, neck/back/hips pain, blurred visions, light sensitivities, focal/distal weakness or numbness, cough, sob, chest pain, nausea, vomiting, fever, chills, abd pain, dysuria or irregular bowel 79 years old male by ems with son and wife c/o pt was dizziness and past out  did not hit his head for one minute while sitting down at the step at 11:00 am. Son sts pt is back to his  base line. Pt is alert and oriented x 3 denies headache, dizziness, neck/back/hips pain, blurred visions, light sensitivities, focal/distal weakness or numbness, cough, sob, chest pain, nausea, vomiting, fever, chills, abd pain, dysuria or irregular bowel - patient has had similar problems in the past

## 2019-09-11 NOTE — ED ADULT NURSE NOTE - OBJECTIVE STATEMENT
Pt received in bed alert and oriented and resting in bed with the c/o that he had an episode of syncope today while walking, as per pt's son, pt fell in arms and so he broke his fall. As per Md's orders Iv janina placed blood specimen obtained and sent to the lab. Nursing care ongoing and safety maintained.

## 2019-09-11 NOTE — CONSULT NOTE ADULT - ASSESSMENT
Recurrent syncope  Dizziness  Hypokalemia  Dehydration  HTN  DM2  HLD    - I doubt seizure, TIA, or stroke. He has a non-focal neurologic exam.   - check orthostatic vitals. Consider EP consult as outpatient.   - neuro stable and non-focal  - cardiology consult    Thank you for the consult.

## 2019-09-11 NOTE — CONSULT NOTE ADULT - SUBJECTIVE AND OBJECTIVE BOX
HPI: 79 year old man with hx of recurrent syncope, dizziness, DM2, HTN, and HLD presenting with syncope. He was walking outside and sat down on the porch. He passed out while sitting. His duaghter noticed his head went down while sitting and did not lose posture. No convulsions. He felt dizzy. This has happened 4 other times and similar symptoms of dizziness prior to passing out. No confusion, tongue bite, focal weakness, or incontinence. Patient noted to be dehydrated today. Back to baseline.     PMHx: HTN, HLD, DM2, recurrent syncope, dizziness, GERD  PSHx: colonoscopy  FHx: None  Allergies: NKDA  Social Hx: non-smoker, no etoh, no illicit drug use  Meds: see EMR  ROS: dizziness, syncope    Vitals Temp 97.6F   HR 82   RR 16   /73  General: NAD  Neuro Exam: AOx3. Follows commands. No dysrthria. No aphasia. EOM intact. No facial droop. PERRL. VFF. Tongue is midline. Palate elevates symmetrically. Shoulder shrug is intact. 5/5 Motor in all four extremities. Finger to nose and heel to shin intact. Reflexes diminished and toes down. Gait exam deferred. No nystagmus noted.     CT head and labs reviwed

## 2019-09-11 NOTE — ED PROVIDER NOTE - CONSTITUTIONAL, MLM
normal... Well appearing, well nourished, awake, alert, oriented to person, place, time/situation and in no apparent distress. Speaking in clear full sentences no nasal flaring no shoulders retractions no diaphoresis, not holding his head/chest/abdomens, smiling appears very comfortable

## 2019-09-11 NOTE — ED ADULT NURSE NOTE - ED STAT RN HANDOFF DETAILS
Pt stable and resting in bed. Pt denies any pain or discomfort as of this time. Pt admitted and handoff report given to FEDE Bob for continuum of care. No sign of distress noted

## 2019-09-12 DIAGNOSIS — Z29.9 ENCOUNTER FOR PROPHYLACTIC MEASURES, UNSPECIFIED: ICD-10-CM

## 2019-09-12 LAB
ANION GAP SERPL CALC-SCNC: 10 MMOL/L — SIGNIFICANT CHANGE UP (ref 5–17)
BUN SERPL-MCNC: 7 MG/DL — SIGNIFICANT CHANGE UP (ref 7–23)
CALCIUM SERPL-MCNC: 8.4 MG/DL — LOW (ref 8.5–10.1)
CHLORIDE SERPL-SCNC: 103 MMOL/L — SIGNIFICANT CHANGE UP (ref 96–108)
CO2 SERPL-SCNC: 26 MMOL/L — SIGNIFICANT CHANGE UP (ref 22–31)
CREAT SERPL-MCNC: 1.33 MG/DL — HIGH (ref 0.5–1.3)
CULTURE RESULTS: SIGNIFICANT CHANGE UP
GLUCOSE BLDC GLUCOMTR-MCNC: 100 MG/DL — HIGH (ref 70–99)
GLUCOSE BLDC GLUCOMTR-MCNC: 110 MG/DL — HIGH (ref 70–99)
GLUCOSE BLDC GLUCOMTR-MCNC: 128 MG/DL — HIGH (ref 70–99)
GLUCOSE BLDC GLUCOMTR-MCNC: 194 MG/DL — HIGH (ref 70–99)
GLUCOSE SERPL-MCNC: 142 MG/DL — HIGH (ref 70–99)
HBA1C BLD-MCNC: 7.2 % — HIGH (ref 4–5.6)
HCT VFR BLD CALC: 46.7 % — SIGNIFICANT CHANGE UP (ref 39–50)
HGB BLD-MCNC: 15.8 G/DL — SIGNIFICANT CHANGE UP (ref 13–17)
MAGNESIUM SERPL-MCNC: 2.2 MG/DL — SIGNIFICANT CHANGE UP (ref 1.6–2.6)
MCHC RBC-ENTMCNC: 30.7 PG — SIGNIFICANT CHANGE UP (ref 27–34)
MCHC RBC-ENTMCNC: 33.8 GM/DL — SIGNIFICANT CHANGE UP (ref 32–36)
MCV RBC AUTO: 90.9 FL — SIGNIFICANT CHANGE UP (ref 80–100)
NRBC # BLD: 0 /100 WBCS — SIGNIFICANT CHANGE UP (ref 0–0)
PHOSPHATE SERPL-MCNC: 2.5 MG/DL — SIGNIFICANT CHANGE UP (ref 2.5–4.5)
PLATELET # BLD AUTO: 292 K/UL — SIGNIFICANT CHANGE UP (ref 150–400)
POTASSIUM SERPL-MCNC: 3.3 MMOL/L — LOW (ref 3.5–5.3)
POTASSIUM SERPL-SCNC: 3.3 MMOL/L — LOW (ref 3.5–5.3)
RBC # BLD: 5.14 M/UL — SIGNIFICANT CHANGE UP (ref 4.2–5.8)
RBC # FLD: 13.6 % — SIGNIFICANT CHANGE UP (ref 10.3–14.5)
SODIUM SERPL-SCNC: 139 MMOL/L — SIGNIFICANT CHANGE UP (ref 135–145)
SPECIMEN SOURCE: SIGNIFICANT CHANGE UP
WBC # BLD: 8.69 K/UL — SIGNIFICANT CHANGE UP (ref 3.8–10.5)
WBC # FLD AUTO: 8.69 K/UL — SIGNIFICANT CHANGE UP (ref 3.8–10.5)

## 2019-09-12 PROCEDURE — 99233 SBSQ HOSP IP/OBS HIGH 50: CPT

## 2019-09-12 PROCEDURE — 93306 TTE W/DOPPLER COMPLETE: CPT | Mod: 26

## 2019-09-12 PROCEDURE — 99223 1ST HOSP IP/OBS HIGH 75: CPT

## 2019-09-12 RX ORDER — POTASSIUM CHLORIDE 20 MEQ
40 PACKET (EA) ORAL ONCE
Refills: 0 | Status: DISCONTINUED | OUTPATIENT
Start: 2019-09-12 | End: 2019-09-13

## 2019-09-12 RX ORDER — REGADENOSON 0.08 MG/ML
0.4 INJECTION, SOLUTION INTRAVENOUS ONCE
Refills: 0 | Status: COMPLETED | OUTPATIENT
Start: 2019-09-12 | End: 2019-09-13

## 2019-09-12 RX ORDER — POTASSIUM CHLORIDE 20 MEQ
40 PACKET (EA) ORAL ONCE
Refills: 0 | Status: COMPLETED | OUTPATIENT
Start: 2019-09-12 | End: 2019-09-12

## 2019-09-12 RX ADMIN — Medication 81 MILLIGRAM(S): at 11:32

## 2019-09-12 RX ADMIN — TICAGRELOR 90 MILLIGRAM(S): 90 TABLET ORAL at 17:03

## 2019-09-12 RX ADMIN — LOSARTAN POTASSIUM 50 MILLIGRAM(S): 100 TABLET, FILM COATED ORAL at 05:27

## 2019-09-12 RX ADMIN — Medication 50 MILLIGRAM(S): at 05:27

## 2019-09-12 RX ADMIN — AMLODIPINE BESYLATE 10 MILLIGRAM(S): 2.5 TABLET ORAL at 05:27

## 2019-09-12 RX ADMIN — TICAGRELOR 90 MILLIGRAM(S): 90 TABLET ORAL at 05:27

## 2019-09-12 RX ADMIN — ATORVASTATIN CALCIUM 20 MILLIGRAM(S): 80 TABLET, FILM COATED ORAL at 21:07

## 2019-09-12 RX ADMIN — Medication 1: at 11:32

## 2019-09-12 RX ADMIN — Medication 40 MILLIEQUIVALENT(S): at 09:27

## 2019-09-12 NOTE — PROGRESS NOTE ADULT - PROBLEM SELECTOR PLAN 1
No neuro deficit on exam, Head CT no acute changes.   f/u Carotid doppler and Echocardiogram   Cardiology consult appreciated, stress test in am  f/u Neuro evaluation. cont w/ ASA, Brilinta and Atorvastatin

## 2019-09-12 NOTE — CONSULT NOTE ADULT - SUBJECTIVE AND OBJECTIVE BOX
CARDIOLOGY CONSULT NOTE    Patient is a 79y Male with a known history of :  Hypokalemia (E87.6)  Other hyperlipidemia (E78.49)  Type 2 diabetes mellitus without complication, without long-term current use of insulin (E11.9)  Hypercholesterolemia (E78.00)  Essential hypertension (I10)  Syncope (R55)    HPI:  80yo man with a PMH CAD s/p 70% pLAD (3.0 x 18mm Resolute JOSH) and 80% pRCA (2.25 x 8mm JOSH) PCI 11/2018 at [facility], HTN, HL, DM; BIBEMS c/o dizziness and syncope for ~one minute while sitting down at the step at 11:00 am. Son sts pt is back to his  base line. Pt is alert and oriented x 3.  Denies chest pain/dyspnea/palpitations.  ECG: sinus 1st degree AVB; minimal lateral ST depressions (<1/2mm)  Gaby neg x 1  No tele events.      REVIEW OF SYSTEMS:    CONSTITUTIONAL: No fever, weight loss, or fatigue  EYES: No eye pain, visual disturbances, or discharge  ENMT:  No difficulty hearing, tinnitus, vertigo; No sinus or throat pain  NECK: No pain or stiffness  BREASTS: No pain, masses, or nipple discharge  RESPIRATORY: No cough, wheezing, chills or hemoptysis; No shortness of breath  CARDIOVASCULAR: No chest pain, palpitations, dizziness, or leg swelling  GASTROINTESTINAL: No abdominal or epigastric pain. No nausea, vomiting, or hematemesis; No diarrhea or constipation. No melena or hematochezia.  GENITOURINARY: No dysuria, frequency, hematuria, or incontinence  NEUROLOGICAL: No headaches, memory loss, loss of strength, numbness, or tremors  SKIN: No itching, burning, rashes, or lesions   LYMPH NODES: No enlarged glands  ENDOCRINE: No heat or cold intolerance; No hair loss  MUSCULOSKELETAL: No joint pain or swelling; No muscle, back, or extremity pain  PSYCHIATRIC: No depression, anxiety, mood swings, or difficulty sleeping  HEME/LYMPH: No easy bruising, or bleeding gums  ALLERGY AND IMMUNOLOGIC: No hives or eczema    MEDICATIONS  (STANDING):  amLODIPine   Tablet 10 milliGRAM(s) Oral daily  aspirin enteric coated 81 milliGRAM(s) Oral daily  atorvastatin 20 milliGRAM(s) Oral at bedtime  influenza   Vaccine 0.5 milliLiter(s) IntraMuscular once  insulin lispro (HumaLOG) corrective regimen sliding scale   SubCutaneous three times a day before meals  insulin lispro (HumaLOG) corrective regimen sliding scale   SubCutaneous at bedtime  losartan 50 milliGRAM(s) Oral daily  metoprolol succinate ER 50 milliGRAM(s) Oral daily  potassium chloride    Tablet ER 40 milliEquivalent(s) Oral once  ticagrelor 90 milliGRAM(s) Oral every 12 hours    MEDICATIONS  (PRN):  dextrose 40% Gel 15 Gram(s) Oral once PRN Blood Glucose LESS THAN 70 milliGRAM(s)/deciliter  glucagon  Injectable 1 milliGRAM(s) IntraMuscular once PRN Glucose LESS THAN 70 milligrams/deciliter      ALLERGIES: No Known Allergies      FAMILY HISTORY:  Family history of cardiovascular disease (Father, Mother, Sibling, Child, Grandparent, Aunt): Family history of hypertension  Family history of diabetes mellitus (Father, Mother, Sibling, Child, Grandparent, Aunt): Family history of diabetes mellitus      PHYSICAL EXAMINATION:  -----------------------------  T(C): 36.5 (09-12-19 @ 05:01), Max: 36.7 (09-11-19 @ 19:44)  HR: 76 (09-12-19 @ 05:01) (76 - 91)  BP: 139/80 (09-12-19 @ 05:01) (139/80 - 158/85)  RR: 17 (09-12-19 @ 05:01) (17 - 20)  SpO2: 98% (09-12-19 @ 05:01) (94% - 98%)  Wt(kg): --    09-11 @ 07:01  -  09-12 @ 07:00  --------------------------------------------------------  IN:  Total IN: 0 mL    OUT:    Voided: 1000 mL  Total OUT: 1000 mL    Total NET: -1000 mL        Height (cm): 162.6 (09-11 @ 21:58)  Weight (kg): 69.9 (09-11 @ 21:58)  BMI (kg/m2): 26.4 (09-11 @ 21:58)  BSA (m2): 1.75 (09-11 @ 21:58)    Constitutional: well developed, normal appearance, well groomed, well nourished, no deformities and no acute distress.   Eyes: the conjunctiva exhibited no abnormalities and the eyelids demonstrated no xanthelasmas.   HEENT: normal oral mucosa, no oral pallor and no oral cyanosis.   Neck: normal jugular venous A waves present, normal jugular venous V waves present and no jugular venous valentin A waves.   Pulmonary: no respiratory distress, normal respiratory rhythm and effort, no accessory muscle use and lungs were clear to auscultation bilaterally.   Cardiovascular: heart rate and rhythm were normal, normal S1 and S2 and no murmur, gallop, rub, heave or thrill are present.   Abdomen: soft, non-tender, no hepato-splenomegaly and no abdominal mass palpated.   Musculoskeletal: the gait could not be assessed..   Extremities: no clubbing of the fingernails, no localized cyanosis, no petechial hemorrhages and no ischemic changes.   Skin: normal skin color and pigmentation, no rash, no venous stasis, no skin lesions, no skin ulcer and no xanthoma was observed.   Psychiatric: oriented to person, place, and time, the affect was normal, the mood was normal and not feeling anxious.     LABS:   --------  09-12    139  |  103  |  7   ----------------------------<  142<H>  3.3<L>   |  26  |  1.33<H>    Ca    8.4<L>      12 Sep 2019 06:17  Phos  2.5     09-12  Mg     2.2     09-12    TPro  7.4  /  Alb  3.5  /  TBili  0.6  /  DBili  x   /  AST  14<L>  /  ALT  19  /  AlkPhos  67  09-11                         15.8   8.69  )-----------( 292      ( 12 Sep 2019 06:17 )             46.7     PT/INR - ( 11 Sep 2019 13:48 )   PT: 11.0 sec;   INR: 0.98 ratio         PTT - ( 11 Sep 2019 13:48 )  PTT:25.7 sec    09-11 @ 13:48 CPK total:--, CKMB --, Troponin I - <.015 ng/mL          RADIOLOGY:  -----------------    ECG: sinus 1st degree AVB; minimal lateral ST depressions (<1/2mm)

## 2019-09-12 NOTE — PROGRESS NOTE ADULT - SUBJECTIVE AND OBJECTIVE BOX
Neurology Progress Note    No acute events. No dizziness or syncope.    Neuro Exam: AOx3. Follows commands, No dysarthria. No facial droop. PERRL. No focal weakness.     A/P:  Recurrent syncope  Dizziness  Hypokalemia  Dehydration  HTN  DM2  HLD    - I doubt seizure, TIA, or stroke. He has a non-focal neurologic exam.   - neuro stable and non-focal  - no further neurologic work up needed at this time.  - cardio following  - will see as needed

## 2019-09-12 NOTE — PHYSICAL THERAPY INITIAL EVALUATION ADULT - LEVEL OF CONSCIOUSNESS, REHAB EVAL
1/3/2019   Celestino Altman is a 66 year old male here for a annual subsequent wellness exam.  Patient Care Team:  See Story MD as PCP - General (Family Practice)  Mj Ibrahim MD (Urology)  HPI       He filled out a Medicare health risk assessment form and I went over this with him today.  It is filed in the EMR.  We discussed the pertinent points that he had marked.  We did discuss treatment options and associated risks and benefits for his medical problems.The following items were also identified and addressed:    Bowel / bladder control problems  Vision concerns   He has no evidence of cognitive dysfunction by direct observation  Advance directives on file: Yes    He does have a living will and power of  set up already.  He is not at risk for aneurysms.   His immunization status was reviewed and listed below.   He has no risk factors for hepatitis B.    Before he left, a preventative screening education printout along with a completed list of services and screening recommendations was given to the patient.  Over the last 2 weeks, how often have you been bothered by the following problems?          PHQ2 Score:  0  1. Little interest or pleasure in doing things?:  Not at all  2. Feeling down, depressed, or hopeless?:  Not at all     PHQ9 Score:  6  3. Trouble falling, staying asleep or sleeping too much?:  Nearly every day  4. Feeling tired or having little energy?:  Several days  5. Poor appetite or overeating?:  Several days  6. Feeling bad about yourself - or that you are a failure or that you have let yourself or your family down?:  Not at all  7. Trouble concentrating on things such as reading a newspaper or watching television?:  Several days  8. Moving or speaking so slowly that other people could have noticed? Or the opposite - being so fidgety or restless that you were moving around a lot more than usual?:  Not at all  9. Thoughts that you would be better off dead, or of hurting yourself in  some way?:  Not at all     If you checked off any problems, how difficult have these problems made it for you to do your work, take care of tings at home, or get along with other people?:  somewhat difficult  0 - 4 = Normal  5 - 9 = Mild Symptoms  10 - 14 = Moderate Symptoms  15 - 19 = Moderate - Severe Symptoms  20 - 27 = Severe Symptoms         .  ROS   Negative other than that detailed in the HPI.  MEDICAL HISTORY     Patient Active Problem List   Diagnosis   • Hayfever   • BPH associated with nocturia   • Hyperlipidemia LDL goal <70   • HTN (hypertension), benign   • Blindness of left eye due to strabismus   • Elevated PSA   • High coronary artery calcium score     Past Surgical History:   Procedure Laterality Date   • Colonoscopy  7/14/2015    due 2020   • Strabismus surgery      as child   • [place] tooth extraction       Immunization History   Administered Date(s) Administered   • Influenza, Unspecified Formulation 10/30/2017   • Influenza, high dose seasonal, preservative-free 10/11/2018   • Influenza, seasonal, injectable, trivalent 10/19/2016   • Pneumococcal Conjugate 13 valent 12/01/2016   • Pneumococcal polysaccharide, adult, 23 valent 12/12/2017   • Zoster Shingles 06/24/2015     FAMILY & SOCIAL HISTORY     Family History   Problem Relation Age of Onset   • Cancer, Ovarian Mother    • Diabetes Mother    • Heart disease Father    • Glaucoma Father    • Multiple Sclerosis Sister    • Multiple Sclerosis Maternal Grandmother    • Cancer, Prostate Brother 63     Social History     Socioeconomic History   • Marital status: /Civil Union     Spouse name: Not on file   • Number of children: 2   • Years of education: Not on file   • Highest education level: Not on file   Social Needs   • Financial resource strain: Not on file   • Food insecurity - worry: Not on file   • Food insecurity - inability: Not on file   • Transportation needs - medical: Not on file   • Transportation needs - non-medical: Not on  file   Occupational History   • Occupation: retired [employer] management   Tobacco Use   • Smoking status: Never Smoker   • Smokeless tobacco: Never Used   Substance and Sexual Activity   • Alcohol use: Yes     Alcohol/week: 1.2 oz     Types: 2 Standard drinks or equivalent per week   • Drug use: No   • Sexual activity: Not on file   Other Topics Concern   • Not on file   Social History Narrative   • Not on file     See Health Risk Assessment questionnaire for additional information on diet/nutrition/physical activity/exercise etc.  PHYSICAL EXAM   Blood pressure 118/72, pulse 60, height 5' 10\" (1.778 m), weight 97.5 kg.Body mass index is 30.85 kg/m².  No exam data present   He was able to hear a forced whisper.   He is able to ambulate without any assistive devices.  General:  A calm male in no acute respiratory distress.  Blind in left eye.   HEENT:  Sclera and lids appear normal. TM's are normal.  Oropharynx is normal.   Neck:  Supple, without adenopathy, JVD, or bruits.  No masses. Thyroid is not enlarged.   Heart:  RRR without murmurs rubs or gallops  Lungs:  Clear to auscultation.  Psych: Mood appears good.  Skin:  No suspicious looking skin lesions were seen.    Abdomen: Soft, nontender, nondistended, with no obvious hepatosplenomegaly.   Extremities:  No edema.   Negative SLR. No calf pain.      ASSESSMENT & PLAN   1. Medicare annual wellness visit, subsequent      2. Hyperlipidemia LDL goal <70    - LIPID PANEL WITHOUT REFLEX    3. HTN (hypertension), benign      4. High coronary artery calcium score      5. Elevated PSA    6. BPH associated with nocturia      7. Blindness of left eye due to strabismus      Patient Instructions     Medicare Wellness Visit  Personalized Plan for Preventive Care  1/3/2019    An important way to stay healthy is to use preventive services provided by doctors and health care providers.  Preventive services can find health problems early when treatment works best and can keep you  from getting certain diseases or illnesses.  Medicare pays for many preventive services to help keep you healthy. To complete your personal preventive care plan, you are in need of the following Health Maintenance screening services. The next due date is listed after the specific service.     Health Maintenance Summary     Topic Due On Due Status Completed On    Colorectal Cancer Screening - Colonoscopy Jul 14, 2020 Not Due Jul 14, 2015    Medicare Wellness Visit Dec 12, 2018 Due On Dec 12, 2017    IMMUNIZATION - DTaP/Tdap/Td Jul 11, 1969 Overdue     Immunization-Influenza  Completed Oct 11, 2018    Depression Screening Dec 12, 2018 Due On Dec 12, 2017    Hepatitis C Screening  Completed Dec 1, 2016    Pneumococcal Vaccine 65+ Low/Medium Risk  Completed Dec 12, 2017    Immunization - Shingles Aug 19, 2015 Overdue Jun 24, 2015    Immunization - MMR  Hidden         Orders Placed This Encounter   • Lipid Panel without Reflex       Preventive Care for Women and Men    Heart Screenings (Cardiovascular):  · Blood tests are used to check your cholesterol, lipid and triglyceride levels. High levels can increase your risk for heart disease and stroke. High levels can be treated with medications, diet and exercise. Lowering your levels can help keep your heart and blood vessels healthy.  Your provider will order these tests if they are needed.    · An ultrasound is done to see if you have an abdominal aortic aneurysm (AAA).  This is an enlargement of one of the main blood vessels that delivers blood to the body.   In the [place], 9,000 deaths are caused by AAA.  You may not even know you have this problem and as many as 1 in 3 people will have a serious problem if it is not treated.  Early diagnosis allows for more effective treatment and cure.  If you have a family history of AAA or are a male age 65-75 who has smoked, you are at higher risk of an AAA.  Your provider can order this test, if needed.    Colorectal  Screening:  · There are many tests that are used to check for cancer of your colon and rectum. You and your provider should discuss what test is best for you and when to have it done.  Options include:  · Screening Colonoscopy: exam of the entire colon, seen through a flexible lighted tube.  · Cologuard DNA stool test: a sample of your stool is used to screen for cancer and unseen blood in your stool.  · Fecal Occult Blood Test: a sample of your stool is studied to find any unseen blood    Flu Shot:  · An immunization that helps to prevent influenza (the flu). You should get this every year. The best time to get the shot is in the fall.    Pneumococcal Shot:  • Vaccines are available that can help prevent pneumococcal disease, which is any type of infection caused by Streptococcus pneumoniae bacteria.   Their use can prevent some cases of pneumonia, meningitis, and sepsis. There are two types of pneumococcal vaccines:   o Conjugate vaccines (PCV-13 or Prevnar 13®) - helps protect against the 13 types of pneumococcal bacteria that are the most common causes of serious infections in children and adults.    o Polysaccharide vaccine (PPSV23 or [id number]®) - helps protect against 23 types of pneumococcal bacteria for patients who are recommended to get it.  These vaccines should be given at least 12 months apart.  A booster is usually not needed.     Hepatitis B Shot:  · An immunization that helps to protect people from getting Hepatitis B. Hepatitis B is a virus that spreads through contact with infected blood or body fluids. Many people with the virus do not have symptoms.  The virus can lead to serious problems, such as liver disease. Some people are at higher risk than others. Your doctor will tell you if you need this shot.     Diabetes Screening:  · A test to measure sugar (glucose) in your blood is called a fasting blood sugar. Fasting means you cannot have food or drink for at least 8 hours before the test.  This test can detect diabetes long before you may notice symptoms.    Glaucoma Screening:  · Glaucoma screening is performed by your eye doctor. The test measures the fluid pressure inside your eyes to determine if you have glaucoma.     Hepatitis C Screening:  · A blood test to see if you have the hepatitis C virus.  Hepatitis C attacks the liver and is a major cause of chronic liver disease.  Medicare will cover a single screening for all adults born between 1945 & 1965, or high risk patients (people who have injected illegal drugs or people who have had blood transfusions).  High risk patients who continue to inject illegal drugs can be screened for Hepatitis C every year.    Smoking and Tobacco-Use Cessation Counseling:  · Tobacco is the single greatest cause of disease and early death in our country today. Medication and counseling together can increase a person’s chance of quitting for good.   · Medicare covers two quitting attempts per year, with four counseling sessions per attempt (eight sessions in a 12 month period)    Preventive Screening tests for Women    Screening Mammograms and Breast Exams:  · An x-ray of your breasts to check for breast cancer before you or your doctor may be able to feel it.  If breast cancer is found early it can usually be treated with success.    Pelvic Exams and Pap Tests:  · An exam to check for cervical and vaginal cancer. A Pap test is a lab test in which cells are taken from your cervix and sent to the lab to look for signs of cervical cancer. If cancer of the cervix is found early, chances for a cure are good. Testing can generally end at age 65, or if a woman has a hysterectomy for a benign condition. Your provider may recommend more frequent testing if certain abnormal results are found.    Bone Mass Measurements:  · A painless x-ray of your bone density to see if you are at risk for a broken bone. Bone density refers to the thickness of bones or how tightly the bone  tissue is packed.    Preventive Screening tests for Men    Prostate Screening:  · PSA - Prostate Cancer blood test.  Experts do not recommend routine screening of healthy men with no signs or symptoms of prostate disease.  However, men should not ignore urinary symptoms, and should discuss their family history with their doctor.    *Medicare pays for many preventive services to keep you healthy. For some of these services, you might have to pay a deductible, coinsurance, and / or copayment.  The amounts vary depending on the type of services you need and the kind of Medicare health plan you have.      Return in about 1 year (around 1/3/2020), or if symptoms worsen or fail to improve, for Wellness Exam.       Medication List           Accurate as of 1/3/19  9:03 AM. If you have any questions, ask your nurse or doctor.               CONTINUE taking these medications    amLODIPine 5 MG tablet  Commonly known as:  NORVASC  Take 1 tablet by mouth daily.     aspirin 81 MG chewable tablet  Chew 1 tablet by mouth daily.     cholecalciferol 1000 UNITS tablet  Commonly known as:  VITAMIN D3     Fish Oil 1000 MG capsule     GLUCOSAMINE CHONDR COMPLEX PO     MULTIVITAMIN PO     rosuvastatin 10 MG tablet  Commonly known as:  CRESTOR  Take 1 tablet by mouth daily.        STOP taking these medications    naproxen 500 MG tablet  Commonly known as:  NAPROSYN  Stopped by:  See Story MD          Allergies as of 01/03/2019 - Reviewed 01/03/2019   Allergen Reaction Noted   • Penicillins ANAPHYLAXIS 02/24/2014   • Seasonal Other (See Comments) 07/09/2015     Return in about 1 year (around 1/3/2020), or if symptoms worsen or fail to improve, for Wellness Exam.    Dominic Story MD  1/3/2019     alert

## 2019-09-12 NOTE — PROGRESS NOTE ADULT - SUBJECTIVE AND OBJECTIVE BOX
Patient is a 79y old  Male who presents with a chief complaint of syncope (12 Sep 2019 12:50)      INTERVAL HPI/ OVERNIGHT EVENTS: Pt was seen and examined at bedside today, No significant overnight events, pt denies any complaints at this time.      MEDICATIONS  (STANDING):  amLODIPine   Tablet 10 milliGRAM(s) Oral daily  aspirin enteric coated 81 milliGRAM(s) Oral daily  atorvastatin 20 milliGRAM(s) Oral at bedtime  dextrose 5%. 1000 milliLiter(s) (50 mL/Hr) IV Continuous <Continuous>  dextrose 50% Injectable 12.5 Gram(s) IV Push once  dextrose 50% Injectable 25 Gram(s) IV Push once  dextrose 50% Injectable 25 Gram(s) IV Push once  influenza   Vaccine 0.5 milliLiter(s) IntraMuscular once  insulin lispro (HumaLOG) corrective regimen sliding scale   SubCutaneous three times a day before meals  insulin lispro (HumaLOG) corrective regimen sliding scale   SubCutaneous at bedtime  losartan 50 milliGRAM(s) Oral daily  metoprolol succinate ER 50 milliGRAM(s) Oral daily  potassium chloride    Tablet ER 40 milliEquivalent(s) Oral once  regadenoson Injectable 0.4 milliGRAM(s) IV Push once  ticagrelor 90 milliGRAM(s) Oral every 12 hours    MEDICATIONS  (PRN):  dextrose 40% Gel 15 Gram(s) Oral once PRN Blood Glucose LESS THAN 70 milliGRAM(s)/deciliter  glucagon  Injectable 1 milliGRAM(s) IntraMuscular once PRN Glucose LESS THAN 70 milligrams/deciliter      Allergies    No Known Allergies    Intolerances        REVIEW OF SYSTEMS:    Unable to examine due to [ ] Encephalopathy [ ] Advanced Dementia [ ] Expressive Aphasia [ ] Non-verbal patient    CONSTITUTIONAL: No fever, NO generalized weakness/Fatigue, No weight loss  EYES: No eye pain, visual disturbances, or discharge  ENMT:  No difficulty hearing, tinnitus, vertigo; No sinus or throat pain  NECK: No pain or stiffness  RESPIRATORY: No shortness of breath,  cough, wheezing, sputum or hemoptysis   CARDIOVASCULAR: No chest pain, palpitations, or leg swelling  GASTROINTESTINAL: No abdominal pain. No nausea, vomiting, diarrhea or constipation. No melena or hematochezia.  GENITOURINARY: No dysuria, frequency, hematuria, or incontinence  NEUROLOGICAL: No headaches, Dizziness, memory loss, loss of strength, numbness, or tremors  SKIN: No itching, burning, rashes, or lesions   MUSCULOSKELETAL: No joint pain or swelling; No muscle, back, or extremity pain  PSYCHIATRIC: No depression, anxiety, mood swings, or difficulty sleeping  HEME/LYMPH: No easy bruising, or bleeding gums      Vital Signs Last 24 Hrs  T(C): 36.4 (12 Sep 2019 10:45), Max: 36.7 (11 Sep 2019 19:44)  T(F): 97.6 (12 Sep 2019 10:45), Max: 98 (11 Sep 2019 19:44)  HR: 72 (12 Sep 2019 14:08) (72 - 91)  BP: 136/74 (12 Sep 2019 14:08) (136/74 - 158/85)  BP(mean): --  RR: 18 (12 Sep 2019 14:08) (17 - 20)  SpO2: 96% (12 Sep 2019 14:08) (94% - 98%)    PHYSICAL EXAM:  GENERAL: NAD, well-developed, well-groomed  HEAD:  Atraumatic, Normocephalic  EYES: conjunctiva and sclera clear  ENMT: Moist mucous membranes  NECK: Supple, No JVD, Normal thyroid  CHEST/LUNG: Clear to Auscultation bilaterally; No rales, rhonchi, wheezing, or rubs  HEART: Regular rate and rhythm; No murmurs, rubs, or gallops  ABDOMEN: Soft, Nontender, Nondistended; Bowel sounds present  EXTREMITIES:  2+ Peripheral Pulses, No clubbing, cyanosis, or edema  SKIN: No rashes or lesions  NERVOUS SYSTEM:  Alert & Oriented X3, Good concentration; Motor Strength 5/5 B/L upper and lower extremities    LABS:                        15.8   8.69  )-----------( 292      ( 12 Sep 2019 06:17 )             46.7     09-12    139  |  103  |  7   ----------------------------<  142<H>  3.3<L>   |  26  |  1.33<H>    Ca    8.4<L>      12 Sep 2019 06:17  Phos  2.5       Mg     2.2         TPro  7.4  /  Alb  3.5  /  TBili  0.6  /  DBili  x   /  AST  14<L>  /  ALT  19  /  AlkPhos  67  -11    PT/INR - ( 11 Sep 2019 13:48 )   PT: 11.0 sec;   INR: 0.98 ratio         PTT - ( 11 Sep 2019 13:48 )  PTT:25.7 sec  Urinalysis Basic - ( 11 Sep 2019 20:24 )    Color: Yellow / Appearance: Clear / S.010 / pH: x  Gluc: x / Ketone: Negative  / Bili: Negative / Urobili: Negative mg/dL   Blood: x / Protein: 30 mg/dL / Nitrite: Negative   Leuk Esterase: Negative / RBC: x / WBC x   Sq Epi: x / Non Sq Epi: x / Bacteria: Few      CAPILLARY BLOOD GLUCOSE      POCT Blood Glucose.: 194 mg/dL (12 Sep 2019 11:01)  POCT Blood Glucose.: 128 mg/dL (12 Sep 2019 07:28)  POCT Blood Glucose.: 94 mg/dL (11 Sep 2019 22:18)          RADIOLOGY & ADDITIONAL TESTS:          Imaging Personally Reviewed:  [ ] YES  [ ] NO    Consultant(s) Notes Reviewed:  [x ] YES  [ ] NO    Care Discussed with Consultants/Other Providers [x ] YES  [ ] NO

## 2019-09-12 NOTE — CONSULT NOTE ADULT - ASSESSMENT
80yo man with a PMH CAD s/p 70% pLAD (3.0 x 18mm Resolute JOSH) and 80% pRCA (2.25 x 8mm JOSH) PCI 11/2018 at [facility], HTN, HL, DM; BIBEMS c/o dizziness and syncope for ~one minute while sitting down at the step at 11:00 am. Son sts pt is back to his  base line. Pt is alert and oriented x 3.  Denies chest pain/dyspnea/palpitations.  ECG: sinus 1st degree AVB; minimal lateral ST depressions (<1/2mm)  Gaby neg x 1  No tele events.  Compliant with Brilinta.    -monitor on tele  -trend Gaby  -2D echo  -nuclear stress test in AM to r/o ischemia  -carotid dopplers  -cont asa/statin/metoprolol/Brilinta

## 2019-09-13 ENCOUNTER — TRANSCRIPTION ENCOUNTER (OUTPATIENT)
Age: 79
End: 2019-09-13

## 2019-09-13 VITALS
WEIGHT: 152.34 LBS | DIASTOLIC BLOOD PRESSURE: 82 MMHG | RESPIRATION RATE: 17 BRPM | TEMPERATURE: 97 F | HEART RATE: 69 BPM | OXYGEN SATURATION: 95 % | SYSTOLIC BLOOD PRESSURE: 145 MMHG

## 2019-09-13 LAB
ANION GAP SERPL CALC-SCNC: 9 MMOL/L — SIGNIFICANT CHANGE UP (ref 5–17)
BUN SERPL-MCNC: 16 MG/DL — SIGNIFICANT CHANGE UP (ref 7–23)
CALCIUM SERPL-MCNC: 8.3 MG/DL — LOW (ref 8.5–10.1)
CHLORIDE SERPL-SCNC: 105 MMOL/L — SIGNIFICANT CHANGE UP (ref 96–108)
CHOLEST SERPL-MCNC: 208 MG/DL — HIGH (ref 10–199)
CO2 SERPL-SCNC: 27 MMOL/L — SIGNIFICANT CHANGE UP (ref 22–31)
CREAT SERPL-MCNC: 1.51 MG/DL — HIGH (ref 0.5–1.3)
GLUCOSE BLDC GLUCOMTR-MCNC: 106 MG/DL — HIGH (ref 70–99)
GLUCOSE SERPL-MCNC: 113 MG/DL — HIGH (ref 70–99)
HDLC SERPL-MCNC: 35 MG/DL — LOW
LIPID PNL WITH DIRECT LDL SERPL: 149 MG/DL — HIGH
POTASSIUM SERPL-MCNC: 3.6 MMOL/L — SIGNIFICANT CHANGE UP (ref 3.5–5.3)
POTASSIUM SERPL-SCNC: 3.6 MMOL/L — SIGNIFICANT CHANGE UP (ref 3.5–5.3)
SODIUM SERPL-SCNC: 141 MMOL/L — SIGNIFICANT CHANGE UP (ref 135–145)
TOTAL CHOLESTEROL/HDL RATIO MEASUREMENT: 5.9 RATIO — SIGNIFICANT CHANGE UP (ref 3.4–9.6)
TRIGL SERPL-MCNC: 122 MG/DL — SIGNIFICANT CHANGE UP (ref 10–149)
TROPONIN I SERPL-MCNC: <.015 NG/ML — SIGNIFICANT CHANGE UP (ref 0.01–0.04)

## 2019-09-13 PROCEDURE — 93880 EXTRACRANIAL BILAT STUDY: CPT | Mod: 26

## 2019-09-13 PROCEDURE — 78452 HT MUSCLE IMAGE SPECT MULT: CPT | Mod: 26

## 2019-09-13 PROCEDURE — 99239 HOSP IP/OBS DSCHRG MGMT >30: CPT

## 2019-09-13 PROCEDURE — 99232 SBSQ HOSP IP/OBS MODERATE 35: CPT

## 2019-09-13 RX ORDER — GLIMEPIRIDE 1 MG
1 TABLET ORAL
Qty: 0 | Refills: 0 | DISCHARGE

## 2019-09-13 RX ORDER — ATORVASTATIN CALCIUM 80 MG/1
40 TABLET, FILM COATED ORAL AT BEDTIME
Refills: 0 | Status: DISCONTINUED | OUTPATIENT
Start: 2019-09-13 | End: 2019-09-13

## 2019-09-13 RX ORDER — ATORVASTATIN CALCIUM 80 MG/1
1 TABLET, FILM COATED ORAL
Qty: 30 | Refills: 0
Start: 2019-09-13 | End: 2019-10-12

## 2019-09-13 RX ADMIN — Medication 50 MILLIGRAM(S): at 05:25

## 2019-09-13 RX ADMIN — TICAGRELOR 90 MILLIGRAM(S): 90 TABLET ORAL at 05:26

## 2019-09-13 RX ADMIN — LOSARTAN POTASSIUM 50 MILLIGRAM(S): 100 TABLET, FILM COATED ORAL at 05:26

## 2019-09-13 RX ADMIN — AMLODIPINE BESYLATE 10 MILLIGRAM(S): 2.5 TABLET ORAL at 05:25

## 2019-09-13 RX ADMIN — REGADENOSON 0.4 MILLIGRAM(S): 0.08 INJECTION, SOLUTION INTRAVENOUS at 11:34

## 2019-09-13 NOTE — DISCHARGE NOTE NURSING/CASE MANAGEMENT/SOCIAL WORK - PATIENT PORTAL LINK FT
You can access the FollowMyHealth Patient Portal offered by [facility] by registering at the following website: [email or web address]. By joining [facility]’s FollowMyHealth portal, you will also be able to view your health information using other applications (apps) compatible with our system.

## 2019-09-13 NOTE — DISCHARGE NOTE PROVIDER - NSDCCPCAREPLAN_GEN_ALL_CORE_FT
PRINCIPAL DISCHARGE DIAGNOSIS  Diagnosis: Syncope  Assessment and Plan of Treatment: No neuro deficit on exam,   Head CT no acute changes.   Carotid doppler: no significant blockage  Echocardiogram: EF 60-65%, grade 1 diastolic   Troponin: negative. EKG SR @ 70bpm, q in lead III, 1st degree block.    cont w/ ASA, Brilinta and Atorvastatin.      SECONDARY DISCHARGE DIAGNOSES  Diagnosis: Hypokalemia  Assessment and Plan of Treatment: Was replaced.   Follow up levels with PCP    Diagnosis: Essential hypertension  Assessment and Plan of Treatment: continue losartan, Norvasc and Metoprolol.    Diagnosis: Other hyperlipidemia  Assessment and Plan of Treatment: Uncontrolled, LDL: 142  Continue with lipitor at higher dose 40mg  low fat diet.   follow up with PCP for monitoring.    Diagnosis: Type 2 diabetes mellitus without complication, without long-term current use of insulin  Assessment and Plan of Treatment: HgA1c: 7.2%  Low A1c for pt's age group.   Would hold Glimepiride   check FS daily, would start Glimpiride only if FS above 150  Follow up with PCP for monitoring. PRINCIPAL DISCHARGE DIAGNOSIS  Diagnosis: Syncope  Assessment and Plan of Treatment: No neuro deficit on exam, Possible hypoglycemia   Head CT no acute changes.   Carotid doppler: no significant blockage  Echocardiogram: EF 60-65%, grade 1 diastolic   Troponin: negative. EKG SR @ 70bpm, q in lead III, 1st degree block.      SECONDARY DISCHARGE DIAGNOSES  Diagnosis: Hypokalemia  Assessment and Plan of Treatment: Was replaced.   Follow up levels with PCP    Diagnosis: Essential hypertension  Assessment and Plan of Treatment: continue losartan, Norvasc and Metoprolol.    Diagnosis: Other hyperlipidemia  Assessment and Plan of Treatment: Uncontrolled, LDL: 149  Continue with lipitor at higher dose 40mg  low fat diet.   follow up with PCP for monitoring.    Diagnosis: Type 2 diabetes mellitus without complication, without long-term current use of insulin  Assessment and Plan of Treatment: HgA1c: 7.2%  Low A1c for pt's age group.   Discontinue Glimepiride   check FS daily, would only restart Glimpiride if glucose is above 150  Follow up with PCP for monitoring.    Diagnosis: CAD in native artery  Assessment and Plan of Treatment: cont w/ ASA, Brilinta and Atorvastatin. PRINCIPAL DISCHARGE DIAGNOSIS  Diagnosis: Syncope  Assessment and Plan of Treatment: No neuro deficit on exam, Possible hypoglycemia   Head CT no acute changes.   Carotid doppler: no significant blockage  Echocardiogram: EF 60-65%, grade 1 diastolic   Troponin: negative. EKG SR @ 70bpm, q in lead III, 1st degree block.      SECONDARY DISCHARGE DIAGNOSES  Diagnosis: Hypokalemia  Assessment and Plan of Treatment: Was replaced.   Follow up levels with PCP    Diagnosis: Essential hypertension  Assessment and Plan of Treatment: continue losartan, Norvasc and Metoprolol.    Diagnosis: Other hyperlipidemia  Assessment and Plan of Treatment: Uncontrolled, LDL: 149  Continue with lipitor at higher dose 40mg  low fat diet.   follow up with PCP for monitoring.    Diagnosis: Type 2 diabetes mellitus without complication, without long-term current use of insulin  Assessment and Plan of Treatment: HgA1c: 7.2%  Low A1c for pt's age group.   Discontinue Glimepiride   check FS daily, would only restart Glimpiride if glucose is above 150  Follow up with PCP for monitoring.    Diagnosis: CAD in native artery  Assessment and Plan of Treatment: cont w/ ASA, Brilinta and Atorvastatin.    Diagnosis: CKD (chronic kidney disease), stage III  Assessment and Plan of Treatment: Presumed secondary to Diabetes.   continue with Losartan   Follow up with PCP for monitoring.    Diagnosis: Diastolic CHF, chronic  Assessment and Plan of Treatment: Echo: EF 60-65%, grade 1 diastolic dysfunction   No intervention required; continue blood pressure control, +Losartan

## 2019-09-13 NOTE — PROGRESS NOTE ADULT - SUBJECTIVE AND OBJECTIVE BOX
Patient is a 79y old  Male who presents with a chief complaint of syncope (12 Sep 2019 16:20)    PAST MEDICAL & SURGICAL HISTORY:  Stented coronary artery: 3 cardiac stents  Essential hypertension: Hypertension  Type 2 diabetes mellitus: Diabetes  Hypercholesterolemia: Hypercholesteremia  GERD (gastroesophageal reflux disease)  Diabetes mellitus type II  Hyperlipidemia  HTN - Hypertension  Other postprocedural status: S/P hemorrhoidectomy  H/O colonoscopy: 7/12    INTERVAL HISTORY:  	  MEDICATIONS:  MEDICATIONS  (STANDING):  amLODIPine   Tablet 10 milliGRAM(s) Oral daily  aspirin enteric coated 81 milliGRAM(s) Oral daily  atorvastatin 20 milliGRAM(s) Oral at bedtime  dextrose 5%. 1000 milliLiter(s) (50 mL/Hr) IV Continuous <Continuous>  dextrose 50% Injectable 12.5 Gram(s) IV Push once  dextrose 50% Injectable 25 Gram(s) IV Push once  dextrose 50% Injectable 25 Gram(s) IV Push once  influenza   Vaccine 0.5 milliLiter(s) IntraMuscular once  insulin lispro (HumaLOG) corrective regimen sliding scale   SubCutaneous three times a day before meals  insulin lispro (HumaLOG) corrective regimen sliding scale   SubCutaneous at bedtime  losartan 50 milliGRAM(s) Oral daily  metoprolol succinate ER 50 milliGRAM(s) Oral daily  potassium chloride    Tablet ER 40 milliEquivalent(s) Oral once  ticagrelor 90 milliGRAM(s) Oral every 12 hours    MEDICATIONS  (PRN):  dextrose 40% Gel 15 Gram(s) Oral once PRN Blood Glucose LESS THAN 70 milliGRAM(s)/deciliter  glucagon  Injectable 1 milliGRAM(s) IntraMuscular once PRN Glucose LESS THAN 70 milligrams/deciliter    Vitals:  T(F): 97.1 (09-13-19 @ 05:10), Max: 97.6 (09-12-19 @ 17:36)  HR: 69 (09-13-19 @ 05:10) (61 - 74)  BP: 145/82 (09-13-19 @ 05:10) (130/75 - 145/82)  RR: 17 (09-13-19 @ 05:10) (17 - 18)  SpO2: 95% (09-13-19 @ 05:10) (95% - 96%)    09-12 @ 07:01  -  09-13 @ 07:00  --------------------------------------------------------  IN:    Oral Fluid: 480 mL  Total IN: 480 mL    OUT:  Total OUT: 0 mL    Total NET: 480 mL    Weight (kg): 69.9 (09-11 @ 21:58)  BMI (kg/m2): 26.4 (09-11 @ 21:58)    PHYSICAL EXAM:  Neuro: Awake, responsive  CV: S1 S2 RRR  Lungs: CTABL  GI: Soft, BS +, ND, NT  Extremities: No edema    TELEMETRY: RSR    RADIOLOGY:   < from: Xray Chest 1 View-PORTABLE IMMEDIATE (09.11.19 @ 12:39) >    Impression: Grossly clear lungs.    < end of copied text >  < from: US Duplex Carotid Arteries Complete, Bilateral (09.13.19 @ 10:28) >  No hemodynamically significant carotid artery stenoses.     < end of copied text >    DIAGNOSTIC TESTING:    [p ] Echocardiogram:   [ P] Stress Test:      LABS:	 	    CARDIAC MARKERS:  Troponin I, Serum: <.015 ng/mL (09-13 @ 07:33)  Troponin I, Serum: <.015 ng/mL (09-11 @ 13:48)    13 Sep 2019 07:33    141    |  105    |  16     ----------------------------<  113    3.6     |  27     |  1.51   12 Sep 2019 06:17    139    |  103    |  7      ----------------------------<  142    3.3     |  26     |  1.33   11 Sep 2019 13:48    141    |  105    |  9      ----------------------------<  189    2.8     |  27     |  1.65     Ca    8.3        13 Sep 2019 07:33  Phos  2.5       12 Sep 2019 06:17  Mg     2.2       12 Sep 2019 06:17    TPro  7.4    /  Alb  3.5    /  TBili  0.6    /  DBili  x      /  AST  14     /  ALT  19     /  AlkPhos  67     11 Sep 2019 13:48                        15.8   8.69  )-----------( 292      ( 12 Sep 2019 06:17 )             46.7 ,                       15.0   11.32 )-----------( 279      ( 11 Sep 2019 13:48 )             43.9     Lipid Profile: 09-13 @ 10:26  HDL/Total Cholesterol: --  HDL Chol:              35 mg/dL  Serum Chol:            208 mg/dL  Direct LDL:            149 mg/dL  Triglycerides:         122 mg/dL    HgA1c: Hemoglobin A1C, Whole Blood: 7.2 % (09-12 @ 09:06)    INR: 0.98 ratio (09-11 @ 13:48) Patient is a 79y old  Male who presents with a chief complaint of syncope (12 Sep 2019 16:20)    PAST MEDICAL & SURGICAL HISTORY:  Stented coronary artery: 3 cardiac stents  Essential hypertension: Hypertension  Type 2 diabetes mellitus: Diabetes  Hypercholesterolemia: Hypercholesteremia  GERD (gastroesophageal reflux disease)  Diabetes mellitus type II  Hyperlipidemia  HTN - Hypertension  Other postprocedural status: S/P hemorrhoidectomy  H/O colonoscopy: 7/12    INTERVAL HISTORY: Resting in bed, not in any acute distress, denies any dizziness or chest discomfort    	  MEDICATIONS:  MEDICATIONS  (STANDING):  amLODIPine   Tablet 10 milliGRAM(s) Oral daily  aspirin enteric coated 81 milliGRAM(s) Oral daily  atorvastatin 20 milliGRAM(s) Oral at bedtime  dextrose 5%. 1000 milliLiter(s) (50 mL/Hr) IV Continuous <Continuous>  dextrose 50% Injectable 12.5 Gram(s) IV Push once  dextrose 50% Injectable 25 Gram(s) IV Push once  dextrose 50% Injectable 25 Gram(s) IV Push once  influenza   Vaccine 0.5 milliLiter(s) IntraMuscular once  insulin lispro (HumaLOG) corrective regimen sliding scale   SubCutaneous three times a day before meals  insulin lispro (HumaLOG) corrective regimen sliding scale   SubCutaneous at bedtime  losartan 50 milliGRAM(s) Oral daily  metoprolol succinate ER 50 milliGRAM(s) Oral daily  potassium chloride    Tablet ER 40 milliEquivalent(s) Oral once  ticagrelor 90 milliGRAM(s) Oral every 12 hours    MEDICATIONS  (PRN):  dextrose 40% Gel 15 Gram(s) Oral once PRN Blood Glucose LESS THAN 70 milliGRAM(s)/deciliter  glucagon  Injectable 1 milliGRAM(s) IntraMuscular once PRN Glucose LESS THAN 70 milligrams/deciliter    Vitals:  T(F): 97.1 (09-13-19 @ 05:10), Max: 97.6 (09-12-19 @ 17:36)  HR: 69 (09-13-19 @ 05:10) (61 - 74)  BP: 145/82 (09-13-19 @ 05:10) (130/75 - 145/82)  RR: 17 (09-13-19 @ 05:10) (17 - 18)  SpO2: 95% (09-13-19 @ 05:10) (95% - 96%)    09-12 @ 07:01  -  09-13 @ 07:00  --------------------------------------------------------  IN:    Oral Fluid: 480 mL  Total IN: 480 mL    OUT:  Total OUT: 0 mL    Total NET: 480 mL    Weight (kg): 69.9 (09-11 @ 21:58)  BMI (kg/m2): 26.4 (09-11 @ 21:58)    PHYSICAL EXAM:  Neuro: Awake, responsive  CV: S1 S2 RRR, + Soft SM  Lungs: CTABL  GI: Soft, BS +, ND, NT  Extremities: No edema    TELEMETRY: RSR    RADIOLOGY:   < from: Xray Chest 1 View-PORTABLE IMMEDIATE (09.11.19 @ 12:39) >    Impression: Grossly clear lungs.    < end of copied text >  < from: US Duplex Carotid Arteries Complete, Bilateral (09.13.19 @ 10:28) >  No hemodynamically significant carotid artery stenoses.     < end of copied text >    DIAGNOSTIC TESTING:    [p ] Echocardiogram:   [ P] Stress Test:      LABS:	 	    CARDIAC MARKERS:  Troponin I, Serum: <.015 ng/mL (09-13 @ 07:33)  Troponin I, Serum: <.015 ng/mL (09-11 @ 13:48)    13 Sep 2019 07:33    141    |  105    |  16     ----------------------------<  113    3.6     |  27     |  1.51   12 Sep 2019 06:17    139    |  103    |  7      ----------------------------<  142    3.3     |  26     |  1.33   11 Sep 2019 13:48    141    |  105    |  9      ----------------------------<  189    2.8     |  27     |  1.65     Ca    8.3        13 Sep 2019 07:33  Phos  2.5       12 Sep 2019 06:17  Mg     2.2       12 Sep 2019 06:17    TPro  7.4    /  Alb  3.5    /  TBili  0.6    /  DBili  x      /  AST  14     /  ALT  19     /  AlkPhos  67     11 Sep 2019 13:48                        15.8   8.69  )-----------( 292      ( 12 Sep 2019 06:17 )             46.7 ,                       15.0   11.32 )-----------( 279      ( 11 Sep 2019 13:48 )             43.9     Lipid Profile: 09-13 @ 10:26  HDL/Total Cholesterol: --  HDL Chol:              35 mg/dL  Serum Chol:            208 mg/dL  Direct LDL:            149 mg/dL  Triglycerides:         122 mg/dL    HgA1c: Hemoglobin A1C, Whole Blood: 7.2 % (09-12 @ 09:06)    INR: 0.98 ratio (09-11 @ 13:48) Patient is a 79y old  Male who presents with a chief complaint of syncope (12 Sep 2019 16:20)    PAST MEDICAL & SURGICAL HISTORY:  Stented coronary artery: 3 cardiac stents  Essential hypertension: Hypertension  Type 2 diabetes mellitus: Diabetes  Hypercholesterolemia: Hypercholesteremia  GERD (gastroesophageal reflux disease)  Diabetes mellitus type II  Hyperlipidemia  HTN - Hypertension  Other postprocedural status: S/P hemorrhoidectomy  H/O colonoscopy: 7/12    INTERVAL HISTORY: Resting in bed, not in any acute distress, denies any dizziness or chest discomfort    	  MEDICATIONS:  MEDICATIONS  (STANDING):  amLODIPine   Tablet 10 milliGRAM(s) Oral daily  aspirin enteric coated 81 milliGRAM(s) Oral daily  atorvastatin 20 milliGRAM(s) Oral at bedtime  dextrose 5%. 1000 milliLiter(s) (50 mL/Hr) IV Continuous <Continuous>  dextrose 50% Injectable 12.5 Gram(s) IV Push once  dextrose 50% Injectable 25 Gram(s) IV Push once  dextrose 50% Injectable 25 Gram(s) IV Push once  influenza   Vaccine 0.5 milliLiter(s) IntraMuscular once  insulin lispro (HumaLOG) corrective regimen sliding scale   SubCutaneous three times a day before meals  insulin lispro (HumaLOG) corrective regimen sliding scale   SubCutaneous at bedtime  losartan 50 milliGRAM(s) Oral daily  metoprolol succinate ER 50 milliGRAM(s) Oral daily  potassium chloride    Tablet ER 40 milliEquivalent(s) Oral once  ticagrelor 90 milliGRAM(s) Oral every 12 hours    MEDICATIONS  (PRN):  dextrose 40% Gel 15 Gram(s) Oral once PRN Blood Glucose LESS THAN 70 milliGRAM(s)/deciliter  glucagon  Injectable 1 milliGRAM(s) IntraMuscular once PRN Glucose LESS THAN 70 milligrams/deciliter    Vitals:  T(F): 97.1 (09-13-19 @ 05:10), Max: 97.6 (09-12-19 @ 17:36)  HR: 69 (09-13-19 @ 05:10) (61 - 74)  BP: 145/82 (09-13-19 @ 05:10) (130/75 - 145/82)  RR: 17 (09-13-19 @ 05:10) (17 - 18)  SpO2: 95% (09-13-19 @ 05:10) (95% - 96%)    09-12 @ 07:01  -  09-13 @ 07:00  --------------------------------------------------------  IN:    Oral Fluid: 480 mL  Total IN: 480 mL    OUT:  Total OUT: 0 mL    Total NET: 480 mL    Weight (kg): 69.9 (09-11 @ 21:58)  BMI (kg/m2): 26.4 (09-11 @ 21:58)    PHYSICAL EXAM:  Neuro: Awake, responsive  CV: S1 S2 RRR, + Soft SM  Lungs: CTABL  GI: Soft, BS +, ND, NT  Extremities: No edema    TELEMETRY: RSR    RADIOLOGY:   < from: Xray Chest 1 View-PORTABLE IMMEDIATE (09.11.19 @ 12:39) >    Impression: Grossly clear lungs.    < end of copied text >  < from: US Duplex Carotid Arteries Complete, Bilateral (09.13.19 @ 10:28) >  No hemodynamically significant carotid artery stenoses.     < end of copied text >    DIAGNOSTIC TESTING:    [x] Echocardiogram: < from: TTE Echo Doppler w/o Cont (09.12.19 @ 15:15) >   1. Left ventricular ejection fraction, by visual estimation, is 60 to   65%.   2. Technically good study.   3. Normal global left ventricular systolic function.   4. Normal left ventricular internal cavity size.   5. Spectral Doppler shows impaired relaxation pattern of left   ventricular myocardial filling (Grade I diastolic dysfunction).   6. Normal right ventricular size and function.   7. There is no evidence of pericardial effusion.   8. Mild thickening and calcification of the anterior and posterior   mitral valve leaflets.   9. Trace mitral valve regurgitation.    < end of copied text >    [ x] Stress Test:    < from: NM Stress Test, Dual Isotope (09.13.19 @ 12:53) >  Normal myocardial perfusion scan.    1. No scintigraphic evidence for myocardial infarct or ischemia.    2. There is normal left ventricular contractility, normal calculated   ejection fraction and normal myocardial thickening at rest. Overall post   stress ejection fraction was 71%     < end of copied text >    LABS:	 	    CARDIAC MARKERS:  Troponin I, Serum: <.015 ng/mL (09-13 @ 07:33)  Troponin I, Serum: <.015 ng/mL (09-11 @ 13:48)    13 Sep 2019 07:33    141    |  105    |  16     ----------------------------<  113    3.6     |  27     |  1.51   12 Sep 2019 06:17    139    |  103    |  7      ----------------------------<  142    3.3     |  26     |  1.33   11 Sep 2019 13:48    141    |  105    |  9      ----------------------------<  189    2.8     |  27     |  1.65     Ca    8.3        13 Sep 2019 07:33  Phos  2.5       12 Sep 2019 06:17  Mg     2.2       12 Sep 2019 06:17    TPro  7.4    /  Alb  3.5    /  TBili  0.6    /  DBili  x      /  AST  14     /  ALT  19     /  AlkPhos  67     11 Sep 2019 13:48                        15.8   8.69  )-----------( 292      ( 12 Sep 2019 06:17 )             46.7 ,                       15.0   11.32 )-----------( 279      ( 11 Sep 2019 13:48 )             43.9     Lipid Profile: 09-13 @ 10:26  HDL/Total Cholesterol: --  HDL Chol:              35 mg/dL  Serum Chol:            208 mg/dL  Direct LDL:            149 mg/dL  Triglycerides:         122 mg/dL    HgA1c: Hemoglobin A1C, Whole Blood: 7.2 % (09-12 @ 09:06)    INR: 0.98 ratio (09-11 @ 13:48)

## 2019-09-13 NOTE — DISCHARGE NOTE PROVIDER - HOSPITAL COURSE
79 years old male by ems with son and wife c/o pt was dizziness and past out  did not hit his head for one minute while sitting down at the step at 11:00 am. Son sts pt is back to his  base line. Pt is alert and oriented x 3 denies headache, dizziness, neck/back/hips pain, blurred visions, light sensitivities, focal/distal weakness or numbness, cough, sob, chest pain, nausea, vomiting, fever, chills, abd pain, dysuria or irregular bowel - patient has had similar problems in the past.        Head CT: no acute changes.         The patient was admitted to telemetry bed. Cardiology and Neurology were consulted and followed the patient. The patient was further worked up with Echocardiogram, carotid doppler and stress test that were all benign. The patient has no symptoms and will be discharged home.

## 2019-09-13 NOTE — PROGRESS NOTE ADULT - ASSESSMENT
80yo man with a PMH CAD s/p 70% pLAD (3.0 x 18mm Resolute JOSH) and 80% pRCA (2.25 x 8mm JOSH) PCI 11/2018 at [facility], HTN, HL, DM; BIBEMS c/o dizziness and syncope  Denies chest pain/dyspnea/palpitations.  trop neg x2  No tele events.  Compliant with Brilinta.    Plan  -monitor on tele  -2D echo pending read  -nuclear stress test  -carotid dopplers with No hemodynamically significant carotid artery stenoses.   -cont asa/statin/metoprolol/Brilinta/losartan 80yo man with a PMH CAD s/p 70% pLAD (3.0 x 18mm Resolute JOSH) and 80% pRCA (2.25 x 8mm JOSH) PCI 11/2018 at [facility], HTN, HL, DM; BIBEMS c/o dizziness and syncope  Denies chest pain/dyspnea/palpitations.  trop neg x2  No tele events.  Compliant with Brilinta.    Plan  -monitor on tele  -2D echo pending read  -nuclear stress test pending read  -carotid dopplers with No hemodynamically significant carotid artery stenoses   -cont asa/statin/metoprolol/Brilinta/losartan   -activity as tolerated 78yo man with a PMH CAD s/p 70% pLAD (3.0 x 18mm Resolute JOSH) and 80% pRCA (2.25 x 8mm JOSH) PCI 11/2018 at [facility], HTN, HL, DM; BIBEMS c/o dizziness and syncope  Denies chest pain/dyspnea/palpitations.  trop neg x2  No tele events.  Compliant with Brilinta.    Plan  -monitor on tele  -2D echo: EF normal, Grade I DD  -nuclear stress test: EF 71%, No scintigraphic evidence for myocardial infarct or ischemia.  -carotid dopplers with No hemodynamically significant carotid artery stenoses   -cont asa/statin/metoprolol/Brilinta/losartan   -activity as tolerated 80yo man with a PMH CAD s/p 70% pLAD (3.0 x 18mm Resolute JOSH) and 80% pRCA (2.25 x 8mm JOSH) PCI 11/2018 at [facility], HTN, HL, DM; BIBEMS c/o dizziness and syncope  Denies chest pain/dyspnea/palpitations.  trop neg x2  No tele events.  Compliant with Brilinta.    Plan  -monitor on tele  -2D echo: EF normal, Grade I DD  -nuclear stress test: EF 71%, No scintigraphic evidence for myocardial infarct or ischemia.  -carotid dopplers with No hemodynamically significant carotid artery stenoses   -cont asa/statin/metoprolol/Brilinta/losartan   -activity as tolerated   -Check orthostatic vital signs

## 2019-09-13 NOTE — DISCHARGE NOTE PROVIDER - CARE PROVIDER_API CALL
Cayla Henderson)  Cardiology; Interventional Cardiology  [address], [place] [id number]  Phone: [phone]  Fax: [phone]  Follow Up Time: 1 week

## 2019-09-16 ENCOUNTER — INBOUND DOCUMENT (OUTPATIENT)
Age: 79
End: 2019-09-16

## 2019-09-16 DIAGNOSIS — R55 SYNCOPE AND COLLAPSE: ICD-10-CM

## 2019-09-16 DIAGNOSIS — N18.3 CHRONIC KIDNEY DISEASE, STAGE 3 (MODERATE): ICD-10-CM

## 2019-09-16 DIAGNOSIS — E78.49 OTHER HYPERLIPIDEMIA: ICD-10-CM

## 2019-09-16 DIAGNOSIS — E11.22 TYPE 2 DIABETES MELLITUS WITH DIABETIC CHRONIC KIDNEY DISEASE: ICD-10-CM

## 2019-09-16 DIAGNOSIS — Z79.82 LONG TERM (CURRENT) USE OF ASPIRIN: ICD-10-CM

## 2019-09-16 DIAGNOSIS — K21.9 GASTRO-ESOPHAGEAL REFLUX DISEASE WITHOUT ESOPHAGITIS: ICD-10-CM

## 2019-09-16 DIAGNOSIS — I13.0 HYPERTENSIVE HEART AND CHRONIC KIDNEY DISEASE WITH HEART FAILURE AND STAGE 1 THROUGH STAGE 4 CHRONIC KIDNEY DISEASE, OR UNSPECIFIED CHRONIC KIDNEY DISEASE: ICD-10-CM

## 2019-09-16 DIAGNOSIS — Z79.899 OTHER LONG TERM (CURRENT) DRUG THERAPY: ICD-10-CM

## 2019-09-16 DIAGNOSIS — Z79.84 LONG TERM (CURRENT) USE OF ORAL HYPOGLYCEMIC DRUGS: ICD-10-CM

## 2019-09-16 DIAGNOSIS — I50.32 CHRONIC DIASTOLIC (CONGESTIVE) HEART FAILURE: ICD-10-CM

## 2019-09-16 DIAGNOSIS — I25.10 ATHEROSCLEROTIC HEART DISEASE OF NATIVE CORONARY ARTERY WITHOUT ANGINA PECTORIS: ICD-10-CM

## 2019-09-16 DIAGNOSIS — E87.6 HYPOKALEMIA: ICD-10-CM

## 2020-12-21 NOTE — H&P ADULT - GASTROINTESTINAL DETAILS
This is been intractable.  Continue antibiotics as needed.  Continue gentle IV fluids     bowel sounds normal/no bruit/no rebound tenderness/no guarding/no masses palpable/nontender

## 2021-02-22 NOTE — PATIENT PROFILE ADULT - LAST ORAL INTAKE
09-Nov-2018 18:28 Bilobed Flap Text: The defect edges were debeveled with a #15 scalpel blade.  Given the location of the defect and the proximity to free margins a bilobe flap was deemed most appropriate.  Using a sterile surgical marker, an appropriate bilobe flap drawn around the defect.    The area thus outlined was incised deep to adipose tissue with a #15 scalpel blade.  The skin margins were undermined to an appropriate distance in all directions utilizing iris scissors.

## 2023-11-13 NOTE — ED PROVIDER NOTE - NS ED MD DISPO SPECIAL CONSIDERATION1
Be sure to take Xarelto 20mg ONCE a day in AM and  not twice a day as previous with the 15mg dose   Flu shot today   Stop ALL use of cigarettes . Consider nicotine gum or lozenge when craving for a cigarettes. May get over the counter  Increase carvedilol to 6.25mg tab, 1 tab twice a day for heart and blood pressure   Continue other medications.  Refills done  See cardiology and vascular and oncology  clinics as scheduled   Labs as ordered      None

## 2024-01-01 ENCOUNTER — TRANSCRIPTION ENCOUNTER (OUTPATIENT)
Age: 84
End: 2024-01-01

## 2024-01-01 ENCOUNTER — INPATIENT (INPATIENT)
Facility: HOSPITAL | Age: 84
LOS: 1 days | End: 2024-01-28
Attending: INTERNAL MEDICINE | Admitting: STUDENT IN AN ORGANIZED HEALTH CARE EDUCATION/TRAINING PROGRAM
Payer: MEDICARE

## 2024-01-01 ENCOUNTER — INPATIENT (INPATIENT)
Facility: HOSPITAL | Age: 84
LOS: 2 days | Discharge: NURSING, REHAB OR LONG-TERM CARE | End: 2024-01-23
Attending: INTERNAL MEDICINE | Admitting: INTERNAL MEDICINE
Payer: MEDICARE

## 2024-01-01 VITALS
RESPIRATION RATE: 16 BRPM | TEMPERATURE: 98 F | WEIGHT: 149.91 LBS | OXYGEN SATURATION: 98 % | HEART RATE: 78 BPM | SYSTOLIC BLOOD PRESSURE: 174 MMHG | DIASTOLIC BLOOD PRESSURE: 107 MMHG | HEIGHT: 67 IN

## 2024-01-01 VITALS
HEART RATE: 86 BPM | OXYGEN SATURATION: 93 % | HEIGHT: 65 IN | DIASTOLIC BLOOD PRESSURE: 72 MMHG | WEIGHT: 158.07 LBS | SYSTOLIC BLOOD PRESSURE: 129 MMHG | TEMPERATURE: 98 F | RESPIRATION RATE: 16 BRPM

## 2024-01-01 VITALS
RESPIRATION RATE: 24 BRPM | SYSTOLIC BLOOD PRESSURE: 110 MMHG | HEART RATE: 69 BPM | DIASTOLIC BLOOD PRESSURE: 61 MMHG | OXYGEN SATURATION: 98 % | TEMPERATURE: 97 F

## 2024-01-01 VITALS — RESPIRATION RATE: 24 BRPM | HEART RATE: 59 BPM | TEMPERATURE: 98 F

## 2024-01-01 DIAGNOSIS — E78.00 PURE HYPERCHOLESTEROLEMIA, UNSPECIFIED: ICD-10-CM

## 2024-01-01 DIAGNOSIS — E11.9 TYPE 2 DIABETES MELLITUS WITHOUT COMPLICATIONS: ICD-10-CM

## 2024-01-01 DIAGNOSIS — I25.10 ATHEROSCLEROTIC HEART DISEASE OF NATIVE CORONARY ARTERY WITHOUT ANGINA PECTORIS: ICD-10-CM

## 2024-01-01 DIAGNOSIS — Z95.5 PRESENCE OF CORONARY ANGIOPLASTY IMPLANT AND GRAFT: ICD-10-CM

## 2024-01-01 DIAGNOSIS — R53.81 OTHER MALAISE: ICD-10-CM

## 2024-01-01 DIAGNOSIS — E04.2 NONTOXIC MULTINODULAR GOITER: ICD-10-CM

## 2024-01-01 DIAGNOSIS — E86.0 DEHYDRATION: ICD-10-CM

## 2024-01-01 DIAGNOSIS — N17.9 ACUTE KIDNEY FAILURE, UNSPECIFIED: ICD-10-CM

## 2024-01-01 DIAGNOSIS — E55.9 VITAMIN D DEFICIENCY, UNSPECIFIED: ICD-10-CM

## 2024-01-01 DIAGNOSIS — E78.5 HYPERLIPIDEMIA, UNSPECIFIED: ICD-10-CM

## 2024-01-01 DIAGNOSIS — D63.8 ANEMIA IN OTHER CHRONIC DISEASES CLASSIFIED ELSEWHERE: ICD-10-CM

## 2024-01-01 DIAGNOSIS — N40.0 BENIGN PROSTATIC HYPERPLASIA WITHOUT LOWER URINARY TRACT SYMPTOMS: ICD-10-CM

## 2024-01-01 DIAGNOSIS — I82.411 ACUTE EMBOLISM AND THROMBOSIS OF RIGHT FEMORAL VEIN: ICD-10-CM

## 2024-01-01 DIAGNOSIS — N18.31 CHRONIC KIDNEY DISEASE, STAGE 3A: ICD-10-CM

## 2024-01-01 DIAGNOSIS — I82.431 ACUTE EMBOLISM AND THROMBOSIS OF RIGHT POPLITEAL VEIN: ICD-10-CM

## 2024-01-01 DIAGNOSIS — G93.41 METABOLIC ENCEPHALOPATHY: ICD-10-CM

## 2024-01-01 DIAGNOSIS — D53.9 NUTRITIONAL ANEMIA, UNSPECIFIED: ICD-10-CM

## 2024-01-01 DIAGNOSIS — I82.401 ACUTE EMBOLISM AND THROMBOSIS OF UNSPECIFIED DEEP VEINS OF RIGHT LOWER EXTREMITY: ICD-10-CM

## 2024-01-01 DIAGNOSIS — I10 ESSENTIAL (PRIMARY) HYPERTENSION: ICD-10-CM

## 2024-01-01 DIAGNOSIS — J18.9 PNEUMONIA, UNSPECIFIED ORGANISM: ICD-10-CM

## 2024-01-01 DIAGNOSIS — Z98.890 OTHER SPECIFIED POSTPROCEDURAL STATES: ICD-10-CM

## 2024-01-01 DIAGNOSIS — K21.9 GASTRO-ESOPHAGEAL REFLUX DISEASE WITHOUT ESOPHAGITIS: ICD-10-CM

## 2024-01-01 DIAGNOSIS — E11.22 TYPE 2 DIABETES MELLITUS WITH DIABETIC CHRONIC KIDNEY DISEASE: ICD-10-CM

## 2024-01-01 DIAGNOSIS — I12.9 HYPERTENSIVE CHRONIC KIDNEY DISEASE WITH STAGE 1 THROUGH STAGE 4 CHRONIC KIDNEY DISEASE, OR UNSPECIFIED CHRONIC KIDNEY DISEASE: ICD-10-CM

## 2024-01-01 DIAGNOSIS — Z79.82 LONG TERM (CURRENT) USE OF ASPIRIN: ICD-10-CM

## 2024-01-01 DIAGNOSIS — R62.7 ADULT FAILURE TO THRIVE: ICD-10-CM

## 2024-01-01 LAB
24R-OH-CALCIDIOL SERPL-MCNC: 7.6 NG/ML — LOW (ref 30–80)
A1C WITH ESTIMATED AVERAGE GLUCOSE RESULT: 7 % — HIGH (ref 4–5.6)
ALBUMIN SERPL ELPH-MCNC: 1.7 G/DL — LOW (ref 3.3–5)
ALBUMIN SERPL ELPH-MCNC: 2.3 G/DL — LOW (ref 3.3–5)
ALBUMIN SERPL ELPH-MCNC: 2.8 G/DL — LOW (ref 3.3–5)
ALP SERPL-CCNC: 101 U/L — SIGNIFICANT CHANGE UP (ref 40–120)
ALP SERPL-CCNC: 104 U/L — SIGNIFICANT CHANGE UP (ref 40–120)
ALP SERPL-CCNC: 111 U/L — SIGNIFICANT CHANGE UP (ref 40–120)
ALT FLD-CCNC: 181 U/L — HIGH (ref 12–78)
ALT FLD-CCNC: 21 U/L — SIGNIFICANT CHANGE UP (ref 12–78)
ALT FLD-CCNC: 21 U/L — SIGNIFICANT CHANGE UP (ref 12–78)
AMMONIA BLD-MCNC: 13 UMOL/L — SIGNIFICANT CHANGE UP (ref 11–32)
AMMONIA BLD-MCNC: 52 UMOL/L — HIGH (ref 11–32)
ANION GAP SERPL CALC-SCNC: 12 MMOL/L — SIGNIFICANT CHANGE UP (ref 5–17)
ANION GAP SERPL CALC-SCNC: 13 MMOL/L — SIGNIFICANT CHANGE UP (ref 5–17)
ANION GAP SERPL CALC-SCNC: 6 MMOL/L — SIGNIFICANT CHANGE UP (ref 5–17)
ANION GAP SERPL CALC-SCNC: 7 MMOL/L — SIGNIFICANT CHANGE UP (ref 5–17)
ANION GAP SERPL CALC-SCNC: 7 MMOL/L — SIGNIFICANT CHANGE UP (ref 5–17)
ANION GAP SERPL CALC-SCNC: 8 MMOL/L — SIGNIFICANT CHANGE UP (ref 5–17)
ANION GAP SERPL CALC-SCNC: 9 MMOL/L — SIGNIFICANT CHANGE UP (ref 5–17)
APPEARANCE UR: CLEAR — SIGNIFICANT CHANGE UP
APTT BLD: 32.9 SEC — SIGNIFICANT CHANGE UP (ref 24.5–35.6)
AST SERPL-CCNC: 15 U/L — SIGNIFICANT CHANGE UP (ref 15–37)
AST SERPL-CCNC: 235 U/L — HIGH (ref 15–37)
AST SERPL-CCNC: 31 U/L — SIGNIFICANT CHANGE UP (ref 15–37)
BACTERIA # UR AUTO: ABNORMAL /HPF
BASE EXCESS BLDA CALC-SCNC: -15.6 MMOL/L — LOW (ref -2–3)
BASE EXCESS BLDA CALC-SCNC: -6.8 MMOL/L — LOW (ref -2–3)
BASE EXCESS BLDA CALC-SCNC: -7.8 MMOL/L — LOW (ref -2–3)
BASE EXCESS BLDA CALC-SCNC: -8.3 MMOL/L — LOW (ref -2–3)
BASE EXCESS BLDV CALC-SCNC: 0.6 MMOL/L — SIGNIFICANT CHANGE UP (ref -2–3)
BASOPHILS # BLD AUTO: 0.02 K/UL — SIGNIFICANT CHANGE UP (ref 0–0.2)
BASOPHILS # BLD AUTO: 0.23 K/UL — HIGH (ref 0–0.2)
BASOPHILS NFR BLD AUTO: 0.3 % — SIGNIFICANT CHANGE UP (ref 0–2)
BASOPHILS NFR BLD AUTO: 1 % — SIGNIFICANT CHANGE UP (ref 0–2)
BILIRUB SERPL-MCNC: 0.3 MG/DL — SIGNIFICANT CHANGE UP (ref 0.2–1.2)
BILIRUB SERPL-MCNC: 0.6 MG/DL — SIGNIFICANT CHANGE UP (ref 0.2–1.2)
BILIRUB SERPL-MCNC: 0.7 MG/DL — SIGNIFICANT CHANGE UP (ref 0.2–1.2)
BILIRUB UR-MCNC: NEGATIVE — SIGNIFICANT CHANGE UP
BLD GP AB SCN SERPL QL: SIGNIFICANT CHANGE UP
BLOOD GAS COMMENTS ARTERIAL: SIGNIFICANT CHANGE UP
BLOOD GAS COMMENTS, VENOUS: SIGNIFICANT CHANGE UP
BUN SERPL-MCNC: 22 MG/DL — SIGNIFICANT CHANGE UP (ref 7–23)
BUN SERPL-MCNC: 22 MG/DL — SIGNIFICANT CHANGE UP (ref 7–23)
BUN SERPL-MCNC: 24 MG/DL — HIGH (ref 7–23)
BUN SERPL-MCNC: 30 MG/DL — HIGH (ref 7–23)
BUN SERPL-MCNC: 30 MG/DL — HIGH (ref 7–23)
BUN SERPL-MCNC: 40 MG/DL — HIGH (ref 7–23)
BUN SERPL-MCNC: 50 MG/DL — HIGH (ref 7–23)
CALCIUM SERPL-MCNC: 6.6 MG/DL — LOW (ref 8.5–10.1)
CALCIUM SERPL-MCNC: 7.5 MG/DL — LOW (ref 8.5–10.1)
CALCIUM SERPL-MCNC: 7.8 MG/DL — LOW (ref 8.5–10.1)
CALCIUM SERPL-MCNC: 7.8 MG/DL — LOW (ref 8.5–10.1)
CALCIUM SERPL-MCNC: 7.9 MG/DL — LOW (ref 8.5–10.1)
CALCIUM SERPL-MCNC: 8.2 MG/DL — LOW (ref 8.5–10.1)
CALCIUM SERPL-MCNC: 8.3 MG/DL — LOW (ref 8.5–10.1)
CHLORIDE BLDV-SCNC: 105 MMOL/L — SIGNIFICANT CHANGE UP (ref 98–107)
CHLORIDE SERPL-SCNC: 107 MMOL/L — SIGNIFICANT CHANGE UP (ref 96–108)
CHLORIDE SERPL-SCNC: 110 MMOL/L — HIGH (ref 96–108)
CHLORIDE SERPL-SCNC: 110 MMOL/L — HIGH (ref 96–108)
CHLORIDE SERPL-SCNC: 111 MMOL/L — HIGH (ref 96–108)
CHLORIDE SERPL-SCNC: 112 MMOL/L — HIGH (ref 96–108)
CO2 BLDA-SCNC: 14 MMOL/L — LOW (ref 19–24)
CO2 BLDA-SCNC: 17 MMOL/L — LOW (ref 19–24)
CO2 BLDA-SCNC: 18 MMOL/L — LOW (ref 19–24)
CO2 BLDA-SCNC: 19 MMOL/L — SIGNIFICANT CHANGE UP (ref 19–24)
CO2 BLDV-SCNC: 26 MMOL/L — SIGNIFICANT CHANGE UP (ref 22–26)
CO2 SERPL-SCNC: 15 MMOL/L — LOW (ref 22–31)
CO2 SERPL-SCNC: 18 MMOL/L — LOW (ref 22–31)
CO2 SERPL-SCNC: 20 MMOL/L — LOW (ref 22–31)
CO2 SERPL-SCNC: 22 MMOL/L — SIGNIFICANT CHANGE UP (ref 22–31)
CO2 SERPL-SCNC: 23 MMOL/L — SIGNIFICANT CHANGE UP (ref 22–31)
CO2 SERPL-SCNC: 23 MMOL/L — SIGNIFICANT CHANGE UP (ref 22–31)
CO2 SERPL-SCNC: 26 MMOL/L — SIGNIFICANT CHANGE UP (ref 22–31)
COLOR SPEC: YELLOW — SIGNIFICANT CHANGE UP
CREAT SERPL-MCNC: 1.51 MG/DL — HIGH (ref 0.5–1.3)
CREAT SERPL-MCNC: 1.64 MG/DL — HIGH (ref 0.5–1.3)
CREAT SERPL-MCNC: 1.93 MG/DL — HIGH (ref 0.5–1.3)
CREAT SERPL-MCNC: 2.09 MG/DL — HIGH (ref 0.5–1.3)
CREAT SERPL-MCNC: 2.3 MG/DL — HIGH (ref 0.5–1.3)
CREAT SERPL-MCNC: 2.77 MG/DL — HIGH (ref 0.5–1.3)
CREAT SERPL-MCNC: 3.51 MG/DL — HIGH (ref 0.5–1.3)
CULTURE RESULTS: NO GROWTH — SIGNIFICANT CHANGE UP
DIFF PNL FLD: NEGATIVE — SIGNIFICANT CHANGE UP
EGFR: 16 ML/MIN/1.73M2 — LOW
EGFR: 22 ML/MIN/1.73M2 — LOW
EGFR: 27 ML/MIN/1.73M2 — LOW
EGFR: 30 ML/MIN/1.73M2 — LOW
EGFR: 34 ML/MIN/1.73M2 — LOW
EGFR: 41 ML/MIN/1.73M2 — LOW
EGFR: 45 ML/MIN/1.73M2 — LOW
EOSINOPHIL # BLD AUTO: 0 K/UL — SIGNIFICANT CHANGE UP (ref 0–0.5)
EOSINOPHIL # BLD AUTO: 0.06 K/UL — SIGNIFICANT CHANGE UP (ref 0–0.5)
EOSINOPHIL NFR BLD AUTO: 0 % — SIGNIFICANT CHANGE UP (ref 0–6)
EOSINOPHIL NFR BLD AUTO: 1 % — SIGNIFICANT CHANGE UP (ref 0–6)
ESTIMATED AVERAGE GLUCOSE: 154 MG/DL — HIGH (ref 68–114)
FLUAV AG NPH QL: SIGNIFICANT CHANGE UP
FLUBV AG NPH QL: SIGNIFICANT CHANGE UP
FOLATE SERPL-MCNC: 6.1 NG/ML — SIGNIFICANT CHANGE UP
GAS PNL BLDA: SIGNIFICANT CHANGE UP
GAS PNL BLDV: 134 MMOL/L — LOW (ref 136–145)
GAS PNL BLDV: SIGNIFICANT CHANGE UP
GAS PNL BLDV: SIGNIFICANT CHANGE UP
GLUCOSE BLDC GLUCOMTR-MCNC: 117 MG/DL — HIGH (ref 70–99)
GLUCOSE BLDC GLUCOMTR-MCNC: 119 MG/DL — HIGH (ref 70–99)
GLUCOSE BLDC GLUCOMTR-MCNC: 123 MG/DL — HIGH (ref 70–99)
GLUCOSE BLDC GLUCOMTR-MCNC: 127 MG/DL — HIGH (ref 70–99)
GLUCOSE BLDC GLUCOMTR-MCNC: 135 MG/DL — HIGH (ref 70–99)
GLUCOSE BLDC GLUCOMTR-MCNC: 144 MG/DL — HIGH (ref 70–99)
GLUCOSE BLDC GLUCOMTR-MCNC: 150 MG/DL — HIGH (ref 70–99)
GLUCOSE BLDC GLUCOMTR-MCNC: 169 MG/DL — HIGH (ref 70–99)
GLUCOSE BLDC GLUCOMTR-MCNC: 188 MG/DL — HIGH (ref 70–99)
GLUCOSE BLDC GLUCOMTR-MCNC: 200 MG/DL — HIGH (ref 70–99)
GLUCOSE BLDC GLUCOMTR-MCNC: 206 MG/DL — HIGH (ref 70–99)
GLUCOSE BLDC GLUCOMTR-MCNC: 209 MG/DL — HIGH (ref 70–99)
GLUCOSE BLDC GLUCOMTR-MCNC: 216 MG/DL — HIGH (ref 70–99)
GLUCOSE BLDC GLUCOMTR-MCNC: 219 MG/DL — HIGH (ref 70–99)
GLUCOSE BLDC GLUCOMTR-MCNC: 231 MG/DL — HIGH (ref 70–99)
GLUCOSE BLDC GLUCOMTR-MCNC: 373 MG/DL — HIGH (ref 70–99)
GLUCOSE BLDC GLUCOMTR-MCNC: 86 MG/DL — SIGNIFICANT CHANGE UP (ref 70–99)
GLUCOSE BLDC GLUCOMTR-MCNC: 94 MG/DL — SIGNIFICANT CHANGE UP (ref 70–99)
GLUCOSE BLDV-MCNC: 161 MG/DL — HIGH (ref 65–95)
GLUCOSE SERPL-MCNC: 103 MG/DL — HIGH (ref 70–99)
GLUCOSE SERPL-MCNC: 119 MG/DL — HIGH (ref 70–99)
GLUCOSE SERPL-MCNC: 119 MG/DL — HIGH (ref 70–99)
GLUCOSE SERPL-MCNC: 133 MG/DL — HIGH (ref 70–99)
GLUCOSE SERPL-MCNC: 153 MG/DL — HIGH (ref 70–99)
GLUCOSE SERPL-MCNC: 221 MG/DL — HIGH (ref 70–99)
GLUCOSE SERPL-MCNC: 407 MG/DL — HIGH (ref 70–99)
GLUCOSE UR QL: >=1000 MG/DL
GRAM STN FLD: SIGNIFICANT CHANGE UP
HCO3 BLDA-SCNC: 13 MMOL/L — LOW (ref 21–28)
HCO3 BLDA-SCNC: 16 MMOL/L — LOW (ref 21–28)
HCO3 BLDA-SCNC: 18 MMOL/L — LOW (ref 21–28)
HCO3 BLDA-SCNC: 18 MMOL/L — LOW (ref 21–28)
HCO3 BLDV-SCNC: 24 MMOL/L — SIGNIFICANT CHANGE UP (ref 22–28)
HCT VFR BLD CALC: 27.3 % — LOW (ref 39–50)
HCT VFR BLD CALC: 27.9 % — LOW (ref 39–50)
HCT VFR BLD CALC: 28.7 % — LOW (ref 39–50)
HCT VFR BLD CALC: 30.1 % — LOW (ref 39–50)
HCT VFR BLD CALC: 31.4 % — LOW (ref 39–50)
HCT VFR BLD CALC: 35.5 % — LOW (ref 39–50)
HCT VFR BLD CALC: 36 % — LOW (ref 39–50)
HCT VFR BLDA CALC: 39 % — SIGNIFICANT CHANGE UP (ref 37–47)
HGB BLD CALC-MCNC: 13 G/DL — SIGNIFICANT CHANGE UP (ref 12.6–17.4)
HGB BLD-MCNC: 10 G/DL — LOW (ref 13–17)
HGB BLD-MCNC: 10.3 G/DL — LOW (ref 13–17)
HGB BLD-MCNC: 11.6 G/DL — LOW (ref 13–17)
HGB BLD-MCNC: 12 G/DL — LOW (ref 13–17)
HGB BLD-MCNC: 9 G/DL — LOW (ref 13–17)
HGB BLD-MCNC: 9.5 G/DL — LOW (ref 13–17)
HGB BLD-MCNC: 9.7 G/DL — LOW (ref 13–17)
HOROWITZ INDEX BLDA+IHG-RTO: 100 — SIGNIFICANT CHANGE UP
HOROWITZ INDEX BLDA+IHG-RTO: 28 — SIGNIFICANT CHANGE UP
IMM GRANULOCYTES NFR BLD AUTO: 2.4 % — HIGH (ref 0–0.9)
INR BLD: 1.83 RATIO — HIGH (ref 0.85–1.18)
KETONES UR-MCNC: NEGATIVE MG/DL — SIGNIFICANT CHANGE UP
LACTATE BLDV-MCNC: 1.2 MMOL/L — SIGNIFICANT CHANGE UP (ref 0.56–1.39)
LACTATE SERPL-SCNC: 1 MMOL/L — SIGNIFICANT CHANGE UP (ref 0.7–2)
LACTATE SERPL-SCNC: 2.4 MMOL/L — HIGH (ref 0.7–2)
LACTATE SERPL-SCNC: 4.1 MMOL/L — CRITICAL HIGH (ref 0.7–2)
LEGIONELLA AG UR QL: NEGATIVE — SIGNIFICANT CHANGE UP
LEUKOCYTE ESTERASE UR-ACNC: NEGATIVE — SIGNIFICANT CHANGE UP
LYMPHOCYTES # BLD AUTO: 1.53 K/UL — SIGNIFICANT CHANGE UP (ref 1–3.3)
LYMPHOCYTES # BLD AUTO: 10 % — LOW (ref 13–44)
LYMPHOCYTES # BLD AUTO: 2.31 K/UL — SIGNIFICANT CHANGE UP (ref 1–3.3)
LYMPHOCYTES # BLD AUTO: 26 % — SIGNIFICANT CHANGE UP (ref 13–44)
MAGNESIUM SERPL-MCNC: 2.3 MG/DL — SIGNIFICANT CHANGE UP (ref 1.6–2.6)
MAGNESIUM SERPL-MCNC: 2.4 MG/DL — SIGNIFICANT CHANGE UP (ref 1.6–2.6)
MAGNESIUM SERPL-MCNC: 2.4 MG/DL — SIGNIFICANT CHANGE UP (ref 1.6–2.6)
MANUAL SMEAR VERIFICATION: SIGNIFICANT CHANGE UP
MCHC RBC-ENTMCNC: 32.7 G/DL — SIGNIFICANT CHANGE UP (ref 32–36)
MCHC RBC-ENTMCNC: 32.8 G/DL — SIGNIFICANT CHANGE UP (ref 32–36)
MCHC RBC-ENTMCNC: 33 G/DL — SIGNIFICANT CHANGE UP (ref 32–36)
MCHC RBC-ENTMCNC: 33 PG — SIGNIFICANT CHANGE UP (ref 27–34)
MCHC RBC-ENTMCNC: 33.2 G/DL — SIGNIFICANT CHANGE UP (ref 32–36)
MCHC RBC-ENTMCNC: 33.2 PG — SIGNIFICANT CHANGE UP (ref 27–34)
MCHC RBC-ENTMCNC: 33.3 G/DL — SIGNIFICANT CHANGE UP (ref 32–36)
MCHC RBC-ENTMCNC: 33.4 PG — SIGNIFICANT CHANGE UP (ref 27–34)
MCHC RBC-ENTMCNC: 33.7 PG — SIGNIFICANT CHANGE UP (ref 27–34)
MCHC RBC-ENTMCNC: 33.8 G/DL — SIGNIFICANT CHANGE UP (ref 32–36)
MCHC RBC-ENTMCNC: 33.9 PG — SIGNIFICANT CHANGE UP (ref 27–34)
MCHC RBC-ENTMCNC: 34.1 G/DL — SIGNIFICANT CHANGE UP (ref 32–36)
MCHC RBC-ENTMCNC: 34.2 PG — HIGH (ref 27–34)
MCHC RBC-ENTMCNC: 34.3 PG — HIGH (ref 27–34)
MCV RBC AUTO: 100 FL — SIGNIFICANT CHANGE UP (ref 80–100)
MCV RBC AUTO: 100.7 FL — HIGH (ref 80–100)
MCV RBC AUTO: 101.1 FL — HIGH (ref 80–100)
MCV RBC AUTO: 101.7 FL — HIGH (ref 80–100)
MCV RBC AUTO: 101.9 FL — HIGH (ref 80–100)
MCV RBC AUTO: 103.8 FL — HIGH (ref 80–100)
MCV RBC AUTO: 99.7 FL — SIGNIFICANT CHANGE UP (ref 80–100)
MONOCYTES # BLD AUTO: 1.41 K/UL — HIGH (ref 0–0.9)
MONOCYTES # BLD AUTO: 2.08 K/UL — HIGH (ref 0–0.9)
MONOCYTES NFR BLD AUTO: 23.9 % — HIGH (ref 2–14)
MONOCYTES NFR BLD AUTO: 9 % — SIGNIFICANT CHANGE UP (ref 2–14)
MRSA PCR RESULT.: SIGNIFICANT CHANGE UP
NEUTROPHILS # BLD AUTO: 18.47 K/UL — HIGH (ref 1.8–7.4)
NEUTROPHILS # BLD AUTO: 2.73 K/UL — SIGNIFICANT CHANGE UP (ref 1.8–7.4)
NEUTROPHILS NFR BLD AUTO: 46.4 % — SIGNIFICANT CHANGE UP (ref 43–77)
NEUTROPHILS NFR BLD AUTO: 73 % — SIGNIFICANT CHANGE UP (ref 43–77)
NEUTS BAND # BLD: 7 % — SIGNIFICANT CHANGE UP (ref 0–8)
NITRITE UR-MCNC: NEGATIVE — SIGNIFICANT CHANGE UP
NRBC # BLD: 0 /100 WBCS — SIGNIFICANT CHANGE UP (ref 0–0)
NRBC # BLD: SIGNIFICANT CHANGE UP /100 WBCS (ref 0–0)
NT-PROBNP SERPL-SCNC: 202 PG/ML — SIGNIFICANT CHANGE UP (ref 0–450)
NT-PROBNP SERPL-SCNC: 9986 PG/ML — HIGH (ref 0–450)
PCO2 BLDA: 30 MMHG — LOW (ref 32–46)
PCO2 BLDA: 31 MMHG — LOW (ref 32–46)
PCO2 BLDA: 35 MMHG — SIGNIFICANT CHANGE UP (ref 32–46)
PCO2 BLDA: 40 MMHG — SIGNIFICANT CHANGE UP (ref 32–46)
PCO2 BLDV: 36 MMHG — LOW (ref 42–55)
PH BLDA: 7.12 — CRITICAL LOW (ref 7.35–7.45)
PH BLDA: 7.31 — LOW (ref 7.35–7.45)
PH BLDA: 7.34 — LOW (ref 7.35–7.45)
PH BLDA: 7.36 — SIGNIFICANT CHANGE UP (ref 7.35–7.45)
PH BLDV: 7.44 — HIGH (ref 7.32–7.43)
PH UR: 6.5 — SIGNIFICANT CHANGE UP (ref 5–8)
PHOSPHATE SERPL-MCNC: 4.6 MG/DL — HIGH (ref 2.5–4.5)
PHOSPHATE SERPL-MCNC: 6.2 MG/DL — HIGH (ref 2.5–4.5)
PLAT MORPH BLD: NORMAL — SIGNIFICANT CHANGE UP
PLATELET # BLD AUTO: 188 K/UL — SIGNIFICANT CHANGE UP (ref 150–400)
PLATELET # BLD AUTO: 197 K/UL — SIGNIFICANT CHANGE UP (ref 150–400)
PLATELET # BLD AUTO: 207 K/UL — SIGNIFICANT CHANGE UP (ref 150–400)
PLATELET # BLD AUTO: 226 K/UL — SIGNIFICANT CHANGE UP (ref 150–400)
PLATELET # BLD AUTO: 271 K/UL — SIGNIFICANT CHANGE UP (ref 150–400)
PLATELET # BLD AUTO: 278 K/UL — SIGNIFICANT CHANGE UP (ref 150–400)
PLATELET # BLD AUTO: 292 K/UL — SIGNIFICANT CHANGE UP (ref 150–400)
PO2 BLDA: 151 MMHG — HIGH (ref 83–108)
PO2 BLDA: 55 MMHG — LOW (ref 83–108)
PO2 BLDA: 61 MMHG — LOW (ref 83–108)
PO2 BLDA: 64 MMHG — LOW (ref 83–108)
PO2 BLDV: 48 MMHG — HIGH (ref 25–45)
POTASSIUM BLDV-SCNC: 3.8 MMOL/L — SIGNIFICANT CHANGE UP (ref 3.5–5.1)
POTASSIUM SERPL-MCNC: 3.2 MMOL/L — LOW (ref 3.5–5.3)
POTASSIUM SERPL-MCNC: 3.3 MMOL/L — LOW (ref 3.5–5.3)
POTASSIUM SERPL-MCNC: 3.3 MMOL/L — LOW (ref 3.5–5.3)
POTASSIUM SERPL-MCNC: 3.5 MMOL/L — SIGNIFICANT CHANGE UP (ref 3.5–5.3)
POTASSIUM SERPL-MCNC: 3.7 MMOL/L — SIGNIFICANT CHANGE UP (ref 3.5–5.3)
POTASSIUM SERPL-MCNC: 4.4 MMOL/L — SIGNIFICANT CHANGE UP (ref 3.5–5.3)
POTASSIUM SERPL-MCNC: 5.2 MMOL/L — SIGNIFICANT CHANGE UP (ref 3.5–5.3)
POTASSIUM SERPL-SCNC: 3.2 MMOL/L — LOW (ref 3.5–5.3)
POTASSIUM SERPL-SCNC: 3.3 MMOL/L — LOW (ref 3.5–5.3)
POTASSIUM SERPL-SCNC: 3.3 MMOL/L — LOW (ref 3.5–5.3)
POTASSIUM SERPL-SCNC: 3.5 MMOL/L — SIGNIFICANT CHANGE UP (ref 3.5–5.3)
POTASSIUM SERPL-SCNC: 3.7 MMOL/L — SIGNIFICANT CHANGE UP (ref 3.5–5.3)
POTASSIUM SERPL-SCNC: 4.4 MMOL/L — SIGNIFICANT CHANGE UP (ref 3.5–5.3)
POTASSIUM SERPL-SCNC: 5.2 MMOL/L — SIGNIFICANT CHANGE UP (ref 3.5–5.3)
PROCALCITONIN SERPL-MCNC: 0.24 NG/ML — HIGH (ref 0.02–0.1)
PROT SERPL-MCNC: 5.6 GM/DL — LOW (ref 6–8.3)
PROT SERPL-MCNC: 6.5 GM/DL — SIGNIFICANT CHANGE UP (ref 6–8.3)
PROT SERPL-MCNC: 7.2 GM/DL — SIGNIFICANT CHANGE UP (ref 6–8.3)
PROT UR-MCNC: 100 MG/DL
PROTHROM AB SERPL-ACNC: 21.4 SEC — HIGH (ref 9.5–13)
RAPID RVP RESULT: SIGNIFICANT CHANGE UP
RBC # BLD: 2.63 M/UL — LOW (ref 4.2–5.8)
RBC # BLD: 2.77 M/UL — LOW (ref 4.2–5.8)
RBC # BLD: 2.88 M/UL — LOW (ref 4.2–5.8)
RBC # BLD: 3.01 M/UL — LOW (ref 4.2–5.8)
RBC # BLD: 3.08 M/UL — LOW (ref 4.2–5.8)
RBC # BLD: 3.51 M/UL — LOW (ref 4.2–5.8)
RBC # BLD: 3.54 M/UL — LOW (ref 4.2–5.8)
RBC # FLD: 13.9 % — SIGNIFICANT CHANGE UP (ref 10.3–14.5)
RBC # FLD: 14 % — SIGNIFICANT CHANGE UP (ref 10.3–14.5)
RBC # FLD: 14 % — SIGNIFICANT CHANGE UP (ref 10.3–14.5)
RBC # FLD: 14.1 % — SIGNIFICANT CHANGE UP (ref 10.3–14.5)
RBC # FLD: 14.2 % — SIGNIFICANT CHANGE UP (ref 10.3–14.5)
RBC # FLD: 14.3 % — SIGNIFICANT CHANGE UP (ref 10.3–14.5)
RBC # FLD: 14.6 % — HIGH (ref 10.3–14.5)
RBC BLD AUTO: NORMAL — SIGNIFICANT CHANGE UP
RBC CASTS # UR COMP ASSIST: 1 /HPF — SIGNIFICANT CHANGE UP (ref 0–4)
S AUREUS DNA NOSE QL NAA+PROBE: DETECTED
SAO2 % BLDA: 84 % — LOW (ref 94–98)
SAO2 % BLDA: 87.3 % — LOW (ref 94–98)
SAO2 % BLDA: 91.1 % — LOW (ref 94–98)
SAO2 % BLDA: 99 % — HIGH (ref 94–98)
SAO2 % BLDV: 72.2 % — LOW (ref 94–98)
SARS-COV-2 RNA SPEC QL NAA+PROBE: SIGNIFICANT CHANGE UP
SARS-COV-2 RNA SPEC QL NAA+PROBE: SIGNIFICANT CHANGE UP
SODIUM SERPL-SCNC: 138 MMOL/L — SIGNIFICANT CHANGE UP (ref 135–145)
SODIUM SERPL-SCNC: 139 MMOL/L — SIGNIFICANT CHANGE UP (ref 135–145)
SODIUM SERPL-SCNC: 139 MMOL/L — SIGNIFICANT CHANGE UP (ref 135–145)
SODIUM SERPL-SCNC: 140 MMOL/L — SIGNIFICANT CHANGE UP (ref 135–145)
SODIUM SERPL-SCNC: 141 MMOL/L — SIGNIFICANT CHANGE UP (ref 135–145)
SODIUM SERPL-SCNC: 142 MMOL/L — SIGNIFICANT CHANGE UP (ref 135–145)
SODIUM SERPL-SCNC: 142 MMOL/L — SIGNIFICANT CHANGE UP (ref 135–145)
SP GR SPEC: 1.02 — SIGNIFICANT CHANGE UP (ref 1–1.03)
SPECIMEN SOURCE: SIGNIFICANT CHANGE UP
SPECIMEN SOURCE: SIGNIFICANT CHANGE UP
TROPONIN I, HIGH SENSITIVITY RESULT: 69.4 NG/L — SIGNIFICANT CHANGE UP
TROPONIN I, HIGH SENSITIVITY RESULT: 7.5 NG/L — SIGNIFICANT CHANGE UP
TSH SERPL-MCNC: 3.57 UIU/ML — SIGNIFICANT CHANGE UP (ref 0.36–3.74)
UROBILINOGEN FLD QL: 1 MG/DL — SIGNIFICANT CHANGE UP (ref 0.2–1)
VIT B12 SERPL-MCNC: 269 PG/ML — SIGNIFICANT CHANGE UP (ref 232–1245)
WBC # BLD: 23.09 K/UL — HIGH (ref 3.8–10.5)
WBC # BLD: 23.32 K/UL — HIGH (ref 3.8–10.5)
WBC # BLD: 24.3 K/UL — HIGH (ref 3.8–10.5)
WBC # BLD: 36.39 K/UL — HIGH (ref 3.8–10.5)
WBC # BLD: 5.89 K/UL — SIGNIFICANT CHANGE UP (ref 3.8–10.5)
WBC # BLD: 52.29 K/UL — CRITICAL HIGH (ref 3.8–10.5)
WBC # BLD: 6.29 K/UL — SIGNIFICANT CHANGE UP (ref 3.8–10.5)
WBC # FLD AUTO: 23.09 K/UL — HIGH (ref 3.8–10.5)
WBC # FLD AUTO: 23.32 K/UL — HIGH (ref 3.8–10.5)
WBC # FLD AUTO: 24.3 K/UL — HIGH (ref 3.8–10.5)
WBC # FLD AUTO: 36.39 K/UL — HIGH (ref 3.8–10.5)
WBC # FLD AUTO: 5.89 K/UL — SIGNIFICANT CHANGE UP (ref 3.8–10.5)
WBC # FLD AUTO: 52.29 K/UL — CRITICAL HIGH (ref 3.8–10.5)
WBC # FLD AUTO: 6.29 K/UL — SIGNIFICANT CHANGE UP (ref 3.8–10.5)
WBC UR QL: 1 /HPF — SIGNIFICANT CHANGE UP (ref 0–5)

## 2024-01-01 PROCEDURE — 93010 ELECTROCARDIOGRAM REPORT: CPT

## 2024-01-01 PROCEDURE — 70450 CT HEAD/BRAIN W/O DYE: CPT | Mod: 26,MA

## 2024-01-01 PROCEDURE — 36556 INSERT NON-TUNNEL CV CATH: CPT

## 2024-01-01 PROCEDURE — 99233 SBSQ HOSP IP/OBS HIGH 50: CPT

## 2024-01-01 PROCEDURE — 70450 CT HEAD/BRAIN W/O DYE: CPT | Mod: 26

## 2024-01-01 PROCEDURE — 99291 CRITICAL CARE FIRST HOUR: CPT | Mod: 25

## 2024-01-01 PROCEDURE — 99285 EMERGENCY DEPT VISIT HI MDM: CPT | Mod: FS

## 2024-01-01 PROCEDURE — G1004: CPT

## 2024-01-01 PROCEDURE — 99497 ADVNCD CARE PLAN 30 MIN: CPT | Mod: 25

## 2024-01-01 PROCEDURE — 70450 CT HEAD/BRAIN W/O DYE: CPT | Mod: 26,MG

## 2024-01-01 PROCEDURE — 74176 CT ABD & PELVIS W/O CONTRAST: CPT | Mod: 26,MA

## 2024-01-01 PROCEDURE — 99291 CRITICAL CARE FIRST HOUR: CPT

## 2024-01-01 PROCEDURE — 71275 CT ANGIOGRAPHY CHEST: CPT | Mod: 26,ME

## 2024-01-01 PROCEDURE — 31645 BRNCHSC W/THER ASPIR 1ST: CPT

## 2024-01-01 PROCEDURE — 99239 HOSP IP/OBS DSCHRG MGMT >30: CPT

## 2024-01-01 PROCEDURE — 76536 US EXAM OF HEAD AND NECK: CPT | Mod: 26

## 2024-01-01 PROCEDURE — 71045 X-RAY EXAM CHEST 1 VIEW: CPT | Mod: 26

## 2024-01-01 PROCEDURE — G0508: CPT

## 2024-01-01 PROCEDURE — 76937 US GUIDE VASCULAR ACCESS: CPT | Mod: 26

## 2024-01-01 PROCEDURE — 99223 1ST HOSP IP/OBS HIGH 75: CPT

## 2024-01-01 PROCEDURE — 99232 SBSQ HOSP IP/OBS MODERATE 35: CPT

## 2024-01-01 PROCEDURE — 93970 EXTREMITY STUDY: CPT | Mod: 26

## 2024-01-01 PROCEDURE — 71250 CT THORAX DX C-: CPT | Mod: 26

## 2024-01-01 RX ORDER — PREGABALIN 225 MG/1
1000 CAPSULE ORAL DAILY
Refills: 0 | Status: DISCONTINUED | OUTPATIENT
Start: 2024-01-01 | End: 2024-01-01

## 2024-01-01 RX ORDER — SODIUM CHLORIDE 9 MG/ML
1000 INJECTION, SOLUTION INTRAVENOUS
Refills: 0 | Status: DISCONTINUED | OUTPATIENT
Start: 2024-01-01 | End: 2024-01-01

## 2024-01-01 RX ORDER — CEFEPIME 1 G/1
2000 INJECTION, POWDER, FOR SOLUTION INTRAMUSCULAR; INTRAVENOUS EVERY 8 HOURS
Refills: 0 | Status: DISCONTINUED | OUTPATIENT
Start: 2024-01-01 | End: 2024-01-01

## 2024-01-01 RX ORDER — DEXTROSE 50 % IN WATER 50 %
25 SYRINGE (ML) INTRAVENOUS ONCE
Refills: 0 | Status: DISCONTINUED | OUTPATIENT
Start: 2024-01-01 | End: 2024-01-01

## 2024-01-01 RX ORDER — INSULIN LISPRO 100/ML
VIAL (ML) SUBCUTANEOUS
Refills: 0 | Status: DISCONTINUED | OUTPATIENT
Start: 2024-01-01 | End: 2024-01-01

## 2024-01-01 RX ORDER — NOREPINEPHRINE BITARTRATE/D5W 8 MG/250ML
0.05 PLASTIC BAG, INJECTION (ML) INTRAVENOUS
Qty: 8 | Refills: 0 | Status: DISCONTINUED | OUTPATIENT
Start: 2024-01-01 | End: 2024-01-01

## 2024-01-01 RX ORDER — INFLUENZA VIRUS VACCINE 15; 15; 15; 15 UG/.5ML; UG/.5ML; UG/.5ML; UG/.5ML
0.7 SUSPENSION INTRAMUSCULAR ONCE
Refills: 0 | Status: DISCONTINUED | OUTPATIENT
Start: 2024-01-01 | End: 2024-01-01

## 2024-01-01 RX ORDER — POTASSIUM CHLORIDE 20 MEQ
40 PACKET (EA) ORAL ONCE
Refills: 0 | Status: COMPLETED | OUTPATIENT
Start: 2024-01-01 | End: 2024-01-01

## 2024-01-01 RX ORDER — FOLIC ACID 0.8 MG
1 TABLET ORAL DAILY
Refills: 0 | Status: DISCONTINUED | OUTPATIENT
Start: 2024-01-01 | End: 2024-01-01

## 2024-01-01 RX ORDER — PROPOFOL 10 MG/ML
10 INJECTION, EMULSION INTRAVENOUS
Qty: 1000 | Refills: 0 | Status: DISCONTINUED | OUTPATIENT
Start: 2024-01-01 | End: 2024-01-01

## 2024-01-01 RX ORDER — DEXTROSE 50 % IN WATER 50 %
15 SYRINGE (ML) INTRAVENOUS ONCE
Refills: 0 | Status: DISCONTINUED | OUTPATIENT
Start: 2024-01-01 | End: 2024-01-01

## 2024-01-01 RX ORDER — VASOPRESSIN 20 [USP'U]/ML
0.04 INJECTION INTRAVENOUS
Qty: 40 | Refills: 0 | Status: DISCONTINUED | OUTPATIENT
Start: 2024-01-01 | End: 2024-01-01

## 2024-01-01 RX ORDER — LOSARTAN POTASSIUM 100 MG/1
50 TABLET, FILM COATED ORAL DAILY
Refills: 0 | Status: DISCONTINUED | OUTPATIENT
Start: 2024-01-01 | End: 2024-01-01

## 2024-01-01 RX ORDER — SODIUM BICARBONATE 1 MEQ/ML
50 SYRINGE (ML) INTRAVENOUS ONCE
Refills: 0 | Status: COMPLETED | OUTPATIENT
Start: 2024-01-01 | End: 2024-01-01

## 2024-01-01 RX ORDER — ACETAMINOPHEN 500 MG
650 TABLET ORAL EVERY 6 HOURS
Refills: 0 | Status: DISCONTINUED | OUTPATIENT
Start: 2024-01-01 | End: 2024-01-01

## 2024-01-01 RX ORDER — ACETAMINOPHEN 500 MG
1000 TABLET ORAL ONCE
Refills: 0 | Status: COMPLETED | OUTPATIENT
Start: 2024-01-01 | End: 2024-01-01

## 2024-01-01 RX ORDER — FENTANYL CITRATE 50 UG/ML
3 INJECTION INTRAVENOUS
Qty: 2500 | Refills: 0 | Status: DISCONTINUED | OUTPATIENT
Start: 2024-01-01 | End: 2024-01-01

## 2024-01-01 RX ORDER — PROPOFOL 10 MG/ML
9.87 INJECTION, EMULSION INTRAVENOUS
Qty: 1000 | Refills: 0 | Status: DISCONTINUED | OUTPATIENT
Start: 2024-01-01 | End: 2024-01-01

## 2024-01-01 RX ORDER — VANCOMYCIN HCL 1 G
750 VIAL (EA) INTRAVENOUS EVERY 24 HOURS
Refills: 0 | Status: DISCONTINUED | OUTPATIENT
Start: 2024-01-01 | End: 2024-01-01

## 2024-01-01 RX ORDER — APIXABAN 2.5 MG/1
10 TABLET, FILM COATED ORAL
Refills: 0 | Status: DISCONTINUED | OUTPATIENT
Start: 2024-01-01 | End: 2024-01-01

## 2024-01-01 RX ORDER — SODIUM BICARBONATE 1 MEQ/ML
0.31 SYRINGE (ML) INTRAVENOUS
Qty: 150 | Refills: 0 | Status: DISCONTINUED | OUTPATIENT
Start: 2024-01-01 | End: 2024-01-01

## 2024-01-01 RX ORDER — LOSARTAN POTASSIUM 100 MG/1
100 TABLET, FILM COATED ORAL DAILY
Refills: 0 | Status: DISCONTINUED | OUTPATIENT
Start: 2024-01-01 | End: 2024-01-01

## 2024-01-01 RX ORDER — NOREPINEPHRINE BITARTRATE/D5W 8 MG/250ML
0.05 PLASTIC BAG, INJECTION (ML) INTRAVENOUS
Qty: 32 | Refills: 0 | Status: DISCONTINUED | OUTPATIENT
Start: 2024-01-01 | End: 2024-01-01

## 2024-01-01 RX ORDER — CEFEPIME 1 G/1
2000 INJECTION, POWDER, FOR SOLUTION INTRAMUSCULAR; INTRAVENOUS ONCE
Refills: 0 | Status: COMPLETED | OUTPATIENT
Start: 2024-01-01 | End: 2024-01-01

## 2024-01-01 RX ORDER — ATORVASTATIN CALCIUM 80 MG/1
40 TABLET, FILM COATED ORAL AT BEDTIME
Refills: 0 | Status: DISCONTINUED | OUTPATIENT
Start: 2024-01-01 | End: 2024-01-01

## 2024-01-01 RX ORDER — AZITHROMYCIN 500 MG/1
500 TABLET, FILM COATED ORAL ONCE
Refills: 0 | Status: COMPLETED | OUTPATIENT
Start: 2024-01-01 | End: 2024-01-01

## 2024-01-01 RX ORDER — SODIUM CHLORIDE 9 MG/ML
1000 INJECTION INTRAMUSCULAR; INTRAVENOUS; SUBCUTANEOUS ONCE
Refills: 0 | Status: COMPLETED | OUTPATIENT
Start: 2024-01-01 | End: 2024-01-01

## 2024-01-01 RX ORDER — SODIUM CHLORIDE 9 MG/ML
10 INJECTION INTRAMUSCULAR; INTRAVENOUS; SUBCUTANEOUS
Refills: 0 | Status: DISCONTINUED | OUTPATIENT
Start: 2024-01-01 | End: 2024-01-01

## 2024-01-01 RX ORDER — APIXABAN 2.5 MG/1
2 TABLET, FILM COATED ORAL
Qty: 74 | Refills: 0
Start: 2024-01-01

## 2024-01-01 RX ORDER — IPRATROPIUM/ALBUTEROL SULFATE 18-103MCG
3 AEROSOL WITH ADAPTER (GRAM) INHALATION ONCE
Refills: 0 | Status: COMPLETED | OUTPATIENT
Start: 2024-01-01 | End: 2024-01-01

## 2024-01-01 RX ORDER — AMLODIPINE BESYLATE 2.5 MG/1
10 TABLET ORAL DAILY
Refills: 0 | Status: DISCONTINUED | OUTPATIENT
Start: 2024-01-01 | End: 2024-01-01

## 2024-01-01 RX ORDER — TICAGRELOR 90 MG/1
90 TABLET ORAL
Refills: 0 | Status: DISCONTINUED | OUTPATIENT
Start: 2024-01-01 | End: 2024-01-01

## 2024-01-01 RX ORDER — APIXABAN 2.5 MG/1
5 TABLET, FILM COATED ORAL EVERY 12 HOURS
Refills: 0 | Status: DISCONTINUED | OUTPATIENT
Start: 2024-01-01 | End: 2024-01-01

## 2024-01-01 RX ORDER — FENTANYL CITRATE 50 UG/ML
50 INJECTION INTRAVENOUS
Refills: 0 | Status: DISCONTINUED | OUTPATIENT
Start: 2024-01-01 | End: 2024-01-01

## 2024-01-01 RX ORDER — PANTOPRAZOLE SODIUM 20 MG/1
40 TABLET, DELAYED RELEASE ORAL DAILY
Refills: 0 | Status: DISCONTINUED | OUTPATIENT
Start: 2024-01-01 | End: 2024-01-01

## 2024-01-01 RX ORDER — AZITHROMYCIN 500 MG/1
TABLET, FILM COATED ORAL
Refills: 0 | Status: DISCONTINUED | OUTPATIENT
Start: 2024-01-01 | End: 2024-01-01

## 2024-01-01 RX ORDER — METOPROLOL TARTRATE 50 MG
50 TABLET ORAL DAILY
Refills: 0 | Status: DISCONTINUED | OUTPATIENT
Start: 2024-01-01 | End: 2024-01-01

## 2024-01-01 RX ORDER — ONDANSETRON 8 MG/1
4 TABLET, FILM COATED ORAL EVERY 8 HOURS
Refills: 0 | Status: DISCONTINUED | OUTPATIENT
Start: 2024-01-01 | End: 2024-01-01

## 2024-01-01 RX ORDER — APIXABAN 2.5 MG/1
10 TABLET, FILM COATED ORAL EVERY 12 HOURS
Refills: 0 | Status: COMPLETED | OUTPATIENT
Start: 2024-01-01 | End: 2024-01-01

## 2024-01-01 RX ORDER — CEFEPIME 1 G/1
2000 INJECTION, POWDER, FOR SOLUTION INTRAMUSCULAR; INTRAVENOUS EVERY 24 HOURS
Refills: 0 | Status: DISCONTINUED | OUTPATIENT
Start: 2024-01-01 | End: 2024-01-01

## 2024-01-01 RX ORDER — ETOMIDATE 2 MG/ML
20 INJECTION INTRAVENOUS ONCE
Refills: 0 | Status: COMPLETED | OUTPATIENT
Start: 2024-01-01 | End: 2024-01-01

## 2024-01-01 RX ORDER — TICAGRELOR 90 MG/1
90 TABLET ORAL EVERY 12 HOURS
Refills: 0 | Status: DISCONTINUED | OUTPATIENT
Start: 2024-01-01 | End: 2024-01-01

## 2024-01-01 RX ORDER — FOLIC ACID 0.8 MG
1 TABLET ORAL
Qty: 0 | Refills: 0 | DISCHARGE
Start: 2024-01-01

## 2024-01-01 RX ORDER — CHLORHEXIDINE GLUCONATE 213 G/1000ML
1 SOLUTION TOPICAL EVERY 12 HOURS
Refills: 0 | Status: DISCONTINUED | OUTPATIENT
Start: 2024-01-01 | End: 2024-01-01

## 2024-01-01 RX ORDER — ERGOCALCIFEROL 1.25 MG/1
50000 CAPSULE ORAL
Refills: 0 | Status: DISCONTINUED | OUTPATIENT
Start: 2024-01-01 | End: 2024-01-01

## 2024-01-01 RX ORDER — FENTANYL CITRATE 50 UG/ML
50 INJECTION INTRAVENOUS ONCE
Refills: 0 | Status: DISCONTINUED | OUTPATIENT
Start: 2024-01-01 | End: 2024-01-01

## 2024-01-01 RX ORDER — IPRATROPIUM/ALBUTEROL SULFATE 18-103MCG
3 AEROSOL WITH ADAPTER (GRAM) INHALATION EVERY 6 HOURS
Refills: 0 | Status: DISCONTINUED | OUTPATIENT
Start: 2024-01-01 | End: 2024-01-01

## 2024-01-01 RX ORDER — CHLORHEXIDINE GLUCONATE 213 G/1000ML
15 SOLUTION TOPICAL EVERY 12 HOURS
Refills: 0 | Status: DISCONTINUED | OUTPATIENT
Start: 2024-01-01 | End: 2024-01-01

## 2024-01-01 RX ORDER — LANOLIN ALCOHOL/MO/W.PET/CERES
3 CREAM (GRAM) TOPICAL AT BEDTIME
Refills: 0 | Status: DISCONTINUED | OUTPATIENT
Start: 2024-01-01 | End: 2024-01-01

## 2024-01-01 RX ORDER — GLUCAGON INJECTION, SOLUTION 0.5 MG/.1ML
1 INJECTION, SOLUTION SUBCUTANEOUS ONCE
Refills: 0 | Status: DISCONTINUED | OUTPATIENT
Start: 2024-01-01 | End: 2024-01-01

## 2024-01-01 RX ORDER — ASPIRIN/CALCIUM CARB/MAGNESIUM 324 MG
81 TABLET ORAL DAILY
Refills: 0 | Status: DISCONTINUED | OUTPATIENT
Start: 2024-01-01 | End: 2024-01-01

## 2024-01-01 RX ORDER — INSULIN LISPRO 100/ML
VIAL (ML) SUBCUTANEOUS EVERY 6 HOURS
Refills: 0 | Status: DISCONTINUED | OUTPATIENT
Start: 2024-01-01 | End: 2024-01-01

## 2024-01-01 RX ORDER — PREGABALIN 225 MG/1
1 CAPSULE ORAL
Qty: 0 | Refills: 0 | DISCHARGE
Start: 2024-01-01

## 2024-01-01 RX ORDER — CISATRACURIUM BESYLATE 2 MG/ML
3 INJECTION INTRAVENOUS
Qty: 200 | Refills: 0 | Status: DISCONTINUED | OUTPATIENT
Start: 2024-01-01 | End: 2024-01-01

## 2024-01-01 RX ORDER — CHLORHEXIDINE GLUCONATE 213 G/1000ML
1 SOLUTION TOPICAL
Refills: 0 | Status: DISCONTINUED | OUTPATIENT
Start: 2024-01-01 | End: 2024-01-01

## 2024-01-01 RX ORDER — LOSARTAN POTASSIUM 100 MG/1
50 TABLET, FILM COATED ORAL ONCE
Refills: 0 | Status: COMPLETED | OUTPATIENT
Start: 2024-01-01 | End: 2024-01-01

## 2024-01-01 RX ORDER — FENTANYL CITRATE 50 UG/ML
0.5 INJECTION INTRAVENOUS
Qty: 2500 | Refills: 0 | Status: DISCONTINUED | OUTPATIENT
Start: 2024-01-01 | End: 2024-01-01

## 2024-01-01 RX ORDER — AZITHROMYCIN 500 MG/1
500 TABLET, FILM COATED ORAL EVERY 24 HOURS
Refills: 0 | Status: DISCONTINUED | OUTPATIENT
Start: 2024-01-29 | End: 2024-01-01

## 2024-01-01 RX ORDER — SODIUM CHLORIDE 9 MG/ML
4 INJECTION INTRAMUSCULAR; INTRAVENOUS; SUBCUTANEOUS EVERY 6 HOURS
Refills: 0 | Status: DISCONTINUED | OUTPATIENT
Start: 2024-01-01 | End: 2024-01-01

## 2024-01-01 RX ORDER — HYDRALAZINE HCL 50 MG
10 TABLET ORAL EVERY 6 HOURS
Refills: 0 | Status: DISCONTINUED | OUTPATIENT
Start: 2024-01-01 | End: 2024-01-01

## 2024-01-01 RX ORDER — DEXTROSE 50 % IN WATER 50 %
12.5 SYRINGE (ML) INTRAVENOUS ONCE
Refills: 0 | Status: DISCONTINUED | OUTPATIENT
Start: 2024-01-01 | End: 2024-01-01

## 2024-01-01 RX ORDER — NOREPINEPHRINE BITARTRATE/D5W 8 MG/250ML
0.05 PLASTIC BAG, INJECTION (ML) INTRAVENOUS
Qty: 16 | Refills: 0 | Status: DISCONTINUED | OUTPATIENT
Start: 2024-01-01 | End: 2024-01-01

## 2024-01-01 RX ORDER — GLIMEPIRIDE 1 MG
1 TABLET ORAL
Refills: 0 | DISCHARGE

## 2024-01-01 RX ORDER — VANCOMYCIN HCL 1 G
1000 VIAL (EA) INTRAVENOUS EVERY 12 HOURS
Refills: 0 | Status: DISCONTINUED | OUTPATIENT
Start: 2024-01-01 | End: 2024-01-01

## 2024-01-01 RX ORDER — ERGOCALCIFEROL 1.25 MG/1
1 CAPSULE ORAL
Qty: 13 | Refills: 0
Start: 2024-01-01

## 2024-01-01 RX ORDER — VANCOMYCIN HCL 1 G
1000 VIAL (EA) INTRAVENOUS ONCE
Refills: 0 | Status: COMPLETED | OUTPATIENT
Start: 2024-01-01 | End: 2024-01-01

## 2024-01-01 RX ORDER — SODIUM CHLORIDE 9 MG/ML
1000 INJECTION, SOLUTION INTRAVENOUS ONCE
Refills: 0 | Status: COMPLETED | OUTPATIENT
Start: 2024-01-01 | End: 2024-01-01

## 2024-01-01 RX ORDER — DORNASE ALFA 1 MG/ML
2.5 SOLUTION RESPIRATORY (INHALATION) EVERY 12 HOURS
Refills: 0 | Status: DISCONTINUED | OUTPATIENT
Start: 2024-01-01 | End: 2024-01-01

## 2024-01-01 RX ADMIN — APIXABAN 5 MILLIGRAM(S): 2.5 TABLET, FILM COATED ORAL at 17:30

## 2024-01-01 RX ADMIN — CHLORHEXIDINE GLUCONATE 15 MILLILITER(S): 213 SOLUTION TOPICAL at 17:30

## 2024-01-01 RX ADMIN — PREGABALIN 1000 MICROGRAM(S): 225 CAPSULE ORAL at 13:13

## 2024-01-01 RX ADMIN — Medication 81 MILLIGRAM(S): at 12:01

## 2024-01-01 RX ADMIN — AMLODIPINE BESYLATE 10 MILLIGRAM(S): 2.5 TABLET ORAL at 05:33

## 2024-01-01 RX ADMIN — Medication 6.72 MICROGRAM(S)/KG/MIN: at 07:31

## 2024-01-01 RX ADMIN — TICAGRELOR 90 MILLIGRAM(S): 90 TABLET ORAL at 06:08

## 2024-01-01 RX ADMIN — APIXABAN 5 MILLIGRAM(S): 2.5 TABLET, FILM COATED ORAL at 18:39

## 2024-01-01 RX ADMIN — Medication 3 MILLILITER(S): at 06:32

## 2024-01-01 RX ADMIN — Medication 40 MILLIEQUIVALENT(S): at 05:34

## 2024-01-01 RX ADMIN — PROPOFOL 4.3 MICROGRAM(S)/KG/MIN: 10 INJECTION, EMULSION INTRAVENOUS at 13:34

## 2024-01-01 RX ADMIN — CHLORHEXIDINE GLUCONATE 1 APPLICATION(S): 213 SOLUTION TOPICAL at 17:30

## 2024-01-01 RX ADMIN — LOSARTAN POTASSIUM 100 MILLIGRAM(S): 100 TABLET, FILM COATED ORAL at 05:35

## 2024-01-01 RX ADMIN — TICAGRELOR 90 MILLIGRAM(S): 90 TABLET ORAL at 05:35

## 2024-01-01 RX ADMIN — Medication 81 MILLIGRAM(S): at 13:13

## 2024-01-01 RX ADMIN — PROPOFOL 4.3 MICROGRAM(S)/KG/MIN: 10 INJECTION, EMULSION INTRAVENOUS at 02:35

## 2024-01-01 RX ADMIN — Medication 2: at 11:59

## 2024-01-01 RX ADMIN — Medication 100 MILLIGRAM(S): at 13:15

## 2024-01-01 RX ADMIN — CHLORHEXIDINE GLUCONATE 1 APPLICATION(S): 213 SOLUTION TOPICAL at 05:45

## 2024-01-01 RX ADMIN — Medication 4: at 11:29

## 2024-01-01 RX ADMIN — APIXABAN 10 MILLIGRAM(S): 2.5 TABLET, FILM COATED ORAL at 06:38

## 2024-01-01 RX ADMIN — Medication 50 MILLILITER(S): at 17:31

## 2024-01-01 RX ADMIN — Medication 1: at 11:09

## 2024-01-01 RX ADMIN — FENTANYL CITRATE 50 MICROGRAM(S): 50 INJECTION INTRAVENOUS at 04:52

## 2024-01-01 RX ADMIN — Medication 81 MILLIGRAM(S): at 12:09

## 2024-01-01 RX ADMIN — ATORVASTATIN CALCIUM 40 MILLIGRAM(S): 80 TABLET, FILM COATED ORAL at 21:47

## 2024-01-01 RX ADMIN — FENTANYL CITRATE 50 MICROGRAM(S): 50 INJECTION INTRAVENOUS at 10:20

## 2024-01-01 RX ADMIN — Medication 50 MILLIGRAM(S): at 05:34

## 2024-01-01 RX ADMIN — Medication 10: at 17:30

## 2024-01-01 RX ADMIN — Medication 1000 MILLIGRAM(S): at 15:18

## 2024-01-01 RX ADMIN — Medication 400 MILLIGRAM(S): at 14:59

## 2024-01-01 RX ADMIN — Medication 250 MILLIGRAM(S): at 14:59

## 2024-01-01 RX ADMIN — FENTANYL CITRATE 50 MICROGRAM(S): 50 INJECTION INTRAVENOUS at 05:22

## 2024-01-01 RX ADMIN — FENTANYL CITRATE 50 MICROGRAM(S): 50 INJECTION INTRAVENOUS at 09:07

## 2024-01-01 RX ADMIN — CEFEPIME 100 MILLIGRAM(S): 1 INJECTION, POWDER, FOR SOLUTION INTRAMUSCULAR; INTRAVENOUS at 14:59

## 2024-01-01 RX ADMIN — APIXABAN 10 MILLIGRAM(S): 2.5 TABLET, FILM COATED ORAL at 17:20

## 2024-01-01 RX ADMIN — TICAGRELOR 90 MILLIGRAM(S): 90 TABLET ORAL at 17:30

## 2024-01-01 RX ADMIN — APIXABAN 10 MILLIGRAM(S): 2.5 TABLET, FILM COATED ORAL at 17:30

## 2024-01-01 RX ADMIN — Medication 3 MILLILITER(S): at 17:14

## 2024-01-01 RX ADMIN — AMLODIPINE BESYLATE 10 MILLIGRAM(S): 2.5 TABLET ORAL at 06:13

## 2024-01-01 RX ADMIN — LOSARTAN POTASSIUM 50 MILLIGRAM(S): 100 TABLET, FILM COATED ORAL at 05:33

## 2024-01-01 RX ADMIN — TICAGRELOR 90 MILLIGRAM(S): 90 TABLET ORAL at 06:40

## 2024-01-01 RX ADMIN — FENTANYL CITRATE 50 MICROGRAM(S): 50 INJECTION INTRAVENOUS at 12:00

## 2024-01-01 RX ADMIN — Medication 100 MILLIGRAM(S): at 21:23

## 2024-01-01 RX ADMIN — LOSARTAN POTASSIUM 100 MILLIGRAM(S): 100 TABLET, FILM COATED ORAL at 06:12

## 2024-01-01 RX ADMIN — ATORVASTATIN CALCIUM 40 MILLIGRAM(S): 80 TABLET, FILM COATED ORAL at 21:30

## 2024-01-01 RX ADMIN — APIXABAN 10 MILLIGRAM(S): 2.5 TABLET, FILM COATED ORAL at 05:29

## 2024-01-01 RX ADMIN — PROPOFOL 4.3 MICROGRAM(S)/KG/MIN: 10 INJECTION, EMULSION INTRAVENOUS at 15:07

## 2024-01-01 RX ADMIN — Medication 1: at 12:06

## 2024-01-01 RX ADMIN — SODIUM CHLORIDE 500 MILLILITER(S): 9 INJECTION INTRAMUSCULAR; INTRAVENOUS; SUBCUTANEOUS at 23:02

## 2024-01-01 RX ADMIN — TICAGRELOR 90 MILLIGRAM(S): 90 TABLET ORAL at 18:39

## 2024-01-01 RX ADMIN — Medication 1 MILLIGRAM(S): at 12:09

## 2024-01-01 RX ADMIN — CEFEPIME 100 MILLIGRAM(S): 1 INJECTION, POWDER, FOR SOLUTION INTRAMUSCULAR; INTRAVENOUS at 13:16

## 2024-01-01 RX ADMIN — TICAGRELOR 90 MILLIGRAM(S): 90 TABLET ORAL at 18:22

## 2024-01-01 RX ADMIN — Medication 50 MILLIGRAM(S): at 06:12

## 2024-01-01 RX ADMIN — APIXABAN 10 MILLIGRAM(S): 2.5 TABLET, FILM COATED ORAL at 05:34

## 2024-01-01 RX ADMIN — ETOMIDATE 20 MILLIGRAM(S): 2 INJECTION INTRAVENOUS at 01:31

## 2024-01-01 RX ADMIN — CEFEPIME 100 MILLIGRAM(S): 1 INJECTION, POWDER, FOR SOLUTION INTRAMUSCULAR; INTRAVENOUS at 13:34

## 2024-01-01 RX ADMIN — FENTANYL CITRATE 21.8 MICROGRAM(S)/KG/HR: 50 INJECTION INTRAVENOUS at 12:30

## 2024-01-01 RX ADMIN — Medication 1 MILLIGRAM(S): at 12:01

## 2024-01-01 RX ADMIN — Medication 150 MEQ/KG/HR: at 19:43

## 2024-01-01 RX ADMIN — SODIUM CHLORIDE 1000 MILLILITER(S): 9 INJECTION, SOLUTION INTRAVENOUS at 15:31

## 2024-01-01 RX ADMIN — AMLODIPINE BESYLATE 10 MILLIGRAM(S): 2.5 TABLET ORAL at 06:40

## 2024-01-01 RX ADMIN — SODIUM CHLORIDE 4 MILLILITER(S): 9 INJECTION INTRAMUSCULAR; INTRAVENOUS; SUBCUTANEOUS at 11:23

## 2024-01-01 RX ADMIN — FENTANYL CITRATE 3.63 MICROGRAM(S)/KG/HR: 50 INJECTION INTRAVENOUS at 11:42

## 2024-01-01 RX ADMIN — FENTANYL CITRATE 50 MICROGRAM(S): 50 INJECTION INTRAVENOUS at 04:22

## 2024-01-01 RX ADMIN — PREGABALIN 1000 MICROGRAM(S): 225 CAPSULE ORAL at 13:15

## 2024-01-01 RX ADMIN — CHLORHEXIDINE GLUCONATE 15 MILLILITER(S): 213 SOLUTION TOPICAL at 05:45

## 2024-01-01 RX ADMIN — Medication 3.4 MICROGRAM(S)/KG/MIN: at 18:52

## 2024-01-01 RX ADMIN — FENTANYL CITRATE 50 MICROGRAM(S): 50 INJECTION INTRAVENOUS at 10:33

## 2024-01-01 RX ADMIN — CISATRACURIUM BESYLATE 13.1 MICROGRAM(S)/KG/MIN: 2 INJECTION INTRAVENOUS at 13:03

## 2024-01-01 RX ADMIN — LOSARTAN POTASSIUM 50 MILLIGRAM(S): 100 TABLET, FILM COATED ORAL at 23:39

## 2024-01-01 RX ADMIN — APIXABAN 10 MILLIGRAM(S): 2.5 TABLET, FILM COATED ORAL at 23:03

## 2024-01-01 RX ADMIN — AZITHROMYCIN 255 MILLIGRAM(S): 500 TABLET, FILM COATED ORAL at 17:48

## 2024-01-01 RX ADMIN — Medication 6.72 MICROGRAM(S)/KG/MIN: at 11:01

## 2024-01-01 RX ADMIN — Medication 3 MILLILITER(S): at 11:22

## 2024-01-01 RX ADMIN — Medication 10 MILLIGRAM(S): at 12:39

## 2024-01-01 RX ADMIN — PREGABALIN 1000 MICROGRAM(S): 225 CAPSULE ORAL at 12:00

## 2024-01-01 RX ADMIN — TICAGRELOR 90 MILLIGRAM(S): 90 TABLET ORAL at 17:20

## 2024-01-01 RX ADMIN — Medication 650 MILLIGRAM(S): at 23:51

## 2024-01-01 RX ADMIN — Medication 250 MILLIGRAM(S): at 15:08

## 2024-01-01 RX ADMIN — Medication 50 MILLIGRAM(S): at 05:33

## 2024-01-01 RX ADMIN — Medication 1 MILLIGRAM(S): at 13:14

## 2024-01-01 RX ADMIN — Medication 40 MILLIEQUIVALENT(S): at 12:01

## 2024-01-01 RX ADMIN — Medication 40 MILLIEQUIVALENT(S): at 22:45

## 2024-01-01 RX ADMIN — APIXABAN 10 MILLIGRAM(S): 2.5 TABLET, FILM COATED ORAL at 06:11

## 2024-01-01 RX ADMIN — FENTANYL CITRATE 50 MICROGRAM(S): 50 INJECTION INTRAVENOUS at 05:52

## 2024-01-01 RX ADMIN — ATORVASTATIN CALCIUM 40 MILLIGRAM(S): 80 TABLET, FILM COATED ORAL at 21:23

## 2024-01-01 RX ADMIN — SODIUM CHLORIDE 4 MILLILITER(S): 9 INJECTION INTRAMUSCULAR; INTRAVENOUS; SUBCUTANEOUS at 08:40

## 2024-01-01 RX ADMIN — APIXABAN 10 MILLIGRAM(S): 2.5 TABLET, FILM COATED ORAL at 18:22

## 2024-01-01 RX ADMIN — Medication 81 MILLIGRAM(S): at 11:09

## 2024-01-01 RX ADMIN — ERGOCALCIFEROL 50000 UNIT(S): 1.25 CAPSULE ORAL at 12:01

## 2024-01-01 RX ADMIN — PANTOPRAZOLE SODIUM 40 MILLIGRAM(S): 20 TABLET, DELAYED RELEASE ORAL at 13:33

## 2024-01-01 RX ADMIN — PROPOFOL 4.3 MICROGRAM(S)/KG/MIN: 10 INJECTION, EMULSION INTRAVENOUS at 07:30

## 2024-01-01 RX ADMIN — VASOPRESSIN 6 UNIT(S)/MIN: 20 INJECTION INTRAVENOUS at 13:01

## 2024-01-01 RX ADMIN — Medication 100 MILLIGRAM(S): at 06:41

## 2024-01-01 RX ADMIN — Medication 6.72 MICROGRAM(S)/KG/MIN: at 02:35

## 2024-01-01 RX ADMIN — LOSARTAN POTASSIUM 50 MILLIGRAM(S): 100 TABLET, FILM COATED ORAL at 09:01

## 2024-01-01 RX ADMIN — Medication 50 MILLIGRAM(S): at 06:40

## 2024-01-01 RX ADMIN — Medication 3.4 MICROGRAM(S)/KG/MIN: at 16:30

## 2024-01-01 RX ADMIN — SODIUM CHLORIDE 1000 MILLILITER(S): 9 INJECTION INTRAMUSCULAR; INTRAVENOUS; SUBCUTANEOUS at 04:37

## 2024-01-01 RX ADMIN — SODIUM CHLORIDE 4 MILLILITER(S): 9 INJECTION INTRAMUSCULAR; INTRAVENOUS; SUBCUTANEOUS at 17:14

## 2024-01-01 RX ADMIN — Medication 250 MILLIGRAM(S): at 18:38

## 2024-01-01 RX ADMIN — Medication 3.4 MICROGRAM(S)/KG/MIN: at 21:04

## 2024-01-01 RX ADMIN — Medication 1: at 12:32

## 2024-01-01 RX ADMIN — TICAGRELOR 90 MILLIGRAM(S): 90 TABLET ORAL at 06:12

## 2024-01-01 RX ADMIN — PROPOFOL 4.3 MICROGRAM(S)/KG/MIN: 10 INJECTION, EMULSION INTRAVENOUS at 19:43

## 2024-01-01 RX ADMIN — AMLODIPINE BESYLATE 10 MILLIGRAM(S): 2.5 TABLET ORAL at 05:35

## 2024-01-04 NOTE — H&P ADULT - BACK
Detail Level: Simple Detail Level: Zone Detail Level: Generalized Detail Level: Detailed No deformity or limitation of movement

## 2024-01-20 NOTE — ED PROVIDER NOTE - ATTENDING CONTRIBUTION TO CARE
Patient evaluated and seen with JOSE MIGUEL Méndez as a shared visit - agree with above history and physical - pt examined and seen by me personally - findings as seen: Patient evaluated for generalized weakness and bilateral leg pain and noted to have DVT on right lower extremity.  Patient to be started on Eliquis and otherwise due to inability to walk will admit for further care and evaluation.  CT head and abdomen without acute findings.  Ammonia level was evaluated due to patient AMS however was negative.

## 2024-01-20 NOTE — ED PROVIDER NOTE - CADM POA CENTRAL LINE
Mendoza MA called to check in with pt this morning, as she delivered triplets yesterday at 33w4d. Babies are presently in the NICU.    Sobeida is known to FRANCESCA Lagunas, who is presently out of the office and will resume following this case on .    Writer assessed for needs and provided monthly parking pass. Informed Mom that Janneth would be back Monday. Sobeida reports that both parents are able to be involved during babies' NICU stay, as FOB is not currently working due to COVID-19.    YUMIKO Hatfield, [facility]   Social Worker  Maternal Child Health  [facility]  Direct: [phone]  Pager: [phone]     No

## 2024-01-20 NOTE — H&P ADULT - NSHPPHYSICALEXAM_GEN_ALL_CORE
T(C): 37.2 (01-20-24 @ 23:06), Max: 37.2 (01-20-24 @ 23:06)  HR: 74 (01-20-24 @ 23:06) (74 - 78)  BP: 174/83 (01-20-24 @ 23:06) (174/83 - 174/107)  RR: 15 (01-20-24 @ 23:06) (15 - 16)  SpO2: 98% (01-20-24 @ 23:06) (98% - 98%)    CONSTITUTIONAL: Well groomed, no apparent distress  EYES: PERRLA and symmetric, EOMI  ENMT: Oral mucosa with dry membranes  RESP: No respiratory distress, no use of accessory muscles; CTA b/l  CV: RRR  GI: Soft, NT, distended  SKIN: RLE greater in size when compared to LLE, RLE tender to palpation

## 2024-01-20 NOTE — ED ADULT NURSE REASSESSMENT NOTE - NS ED NURSE REASSESS COMMENT FT1
Attempted to call report to 1C but RN was busy receiving another pt and the other RN was at 6 already.

## 2024-01-20 NOTE — ED PROVIDER NOTE - NSICDXPASTMEDICALHX_GEN_ALL_CORE_FT
PAST MEDICAL HISTORY:  Diabetes mellitus type II     Essential hypertension Hypertension    GERD (gastroesophageal reflux disease)     HTN - Hypertension     Hypercholesterolemia Hypercholesteremia    Hyperlipidemia     Stented coronary artery 3 cardiac stents    Type 2 diabetes mellitus Diabetes

## 2024-01-20 NOTE — ED ADULT NURSE NOTE - CHIEF COMPLAINT QUOTE
brought by ems for generalized weakness since four days and rt leg pain . h/o Cardiac, HTN, Dm, CHF stents times three, prostate issues. pt just came from [facility] today , has not seen a doctor over a year .

## 2024-01-20 NOTE — H&P ADULT - ASSESSMENT
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    RLE DVT  Complaints of inability to ambulate after landing in [place] today after long 24 hour flight  B/l LE venous doppler with R. at the knee nonocclusive deep venous thrombosis; distal femoral and popliteal veins  Apixaban 10 mg BID x 7 days started, followed by 5 mg BID     SUDHIR on CKD stage IIIa  BUN 24 and Cr 1.93 on admission, baseline Cr ~1.4-1.5  CT A/P without evidence of hydronephrosis  S/p 1L NS bolus in the ED  Avoid nephrotoxins, renally dose meds  Encourage PO hydration  Monitor renal function    Physical deconditioning  Complaints of inability to ambulate today, baseline ambulates with walker  PT/OT consulted    Acute metabolic encephalopathy, resolved  As per son-in-law, patient did not appear like himself today  RVP negative, NCHCT without acute intracranial findings, CT A/P without infectious etiology      Chronic medical conditions:   HTN, uncontrolled: BP as elevated as 174/107 on admission, PTA amlodipine 10 mg, metoprolol succinate 50 mg  HLD: PTA atorvastatin 20 mg  CAD (s/p PCI x 2 in 11/2018): PTA ASA 81 mg, ticagrelor 90 mg BID  NIDDM2: last known A1c 7.2 (on 9/12/19), POC qac and qhs, PTA glimepiride 1 mg held, low dose SSI qac started, blood glucose goal < 180, f/u A1c    Medication reconciliation completed using admission med rec from 9/11/19 as son-in-law does not have med list with him. He is bringing in med list in AM. Please confirm in AM.    Plan of care discussed with margoingunjan at bedside. 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    RLE DVT  Complaints of inability to ambulate after landing in [place] today after long 24 hour flight  B/l LE venous doppler with R. at the knee nonocclusive deep venous thrombosis; distal femoral and popliteal veins  Apixaban 10 mg BID x 7 days started, followed by 5 mg BID     SUDHIR on CKD stage IIIa  BUN 24 and Cr 1.93 on admission, baseline Cr ~1.4-1.5  CT A/P without evidence of hydronephrosis  S/p 1L NS bolus in the ED  Avoid nephrotoxins, renally dose meds  Encourage PO hydration  Monitor renal function    Physical deconditioning  Complaints of inability to ambulate today, baseline ambulates with walker  PT/OT consulted    Acute metabolic encephalopathy, resolved  As per son-in-law, patient did not appear like himself today  RVP negative, NCHCT without acute intracranial findings, CT A/P without infectious etiology      Chronic medical conditions:   HTN, uncontrolled: BP as elevated as 174/107 on admission, PTA amlodipine 10 mg, metoprolol succinate 50 mg  HLD: PTA atorvastatin 20 mg  CAD (s/p PCI x 2 in 11/2018): PTA ASA 81 mg, ticagrelor 90 mg BID, consult cardiology given now on triple therapy and increased fall risk - please call in AM  NIDDM2: last known A1c 7.2 (on 9/12/19), POC qac and qhs, PTA glimepiride 1 mg held, low dose SSI qac started, blood glucose goal < 180, f/u A1c    Medication reconciliation completed using admission med rec from 9/11/19 as son-in-law does not have med list with him. He is bringing in med list in AM. Please confirm in AM.    Plan of care discussed with amy-in- at bedside.

## 2024-01-20 NOTE — ED ADULT NURSE REASSESSMENT NOTE - NS ED NURSE REASSESS COMMENT FT1
Covering for primary RN. Awaiting pt return from [facility] to update pt V/S. Covering for primary RN. Awaiting pt return from [facility] to update pt V/S and draw ammonia.

## 2024-01-20 NOTE — H&P ADULT - NSHPLABSRESULTS_GEN_ALL_CORE
12.0   5.89  )-----------( 188      ( 20 Jan 2024 19:40 )             36.0   01-20    141  |  107  |  24<H>  ----------------------------<  133<H>  3.2<L>   |  26  |  1.93<H>    Ca    8.2<L>      20 Jan 2024 19:01  Mg     2.3     01-20    TPro  7.2  /  Alb  2.8<L>  /  TBili  0.3  /  DBili  x   /  AST  15  /  ALT  21  /  AlkPhos  101  01-20    Urinalysis Basic - ( 20 Jan 2024 19:01 )    Color: x / Appearance: x / SG: x / pH: x  Gluc: 133 mg/dL / Ketone: x  / Bili: x / Urobili: x   Blood: x / Protein: x / Nitrite: x   Leuk Esterase: x / RBC: x / WBC x   Sq Epi: x / Non Sq Epi: x / Bacteria: x    CT head without contrast 1/20/24  IMPRESSION:  Stable head CT. No acute intracranial hemorrhage, vasogenic edema or hydrocephalus. Chronic findings as above.    CT A/P without contrast 1/20/24  IMPRESSION:  No bowel obstruction.  Overdistention of bladder without wall thickening or stones.    B/l LE venous doppler 1/20/24  IMPRESSION:  Right: At the knee nonocclusive deep venous thrombosis; distal femoral and popliteal veins.    LEFT: No evidence for deep venous thrombosis.

## 2024-01-20 NOTE — H&P ADULT - HISTORY OF PRESENT ILLNESS
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    Patient is Kyrgyz-speaking but declined  services and preferred son-in-law to translate. As per son-in-law at bedside, patient flew back to [place] from [facility] today after a 24 hour plane ride. As he was trying to get off the plane, he was unable to ambulate. Baseline ambulates with walker. Due to this, son-in-law drove straight here to the ER from the airport.     In the ED, VSS except BP as elevated as 174/107. Hgb 12.0, potassium 3.2, BUN 24, Cr 1.93. RVP negative. NCHCT without acute intracranial findings. CT A/P without bowel obstruction and with overdistention of bladder without wall thickening or stones. B/l LE venous doppler with R. at the knee nonocclusive deep venous thrombosis; distal femoral and popliteal veins. Received 1L NS bolus.

## 2024-01-20 NOTE — ED PROVIDER NOTE - NSICDXPASTSURGICALHX_GEN_ALL_CORE_FT
PAST SURGICAL HISTORY:  H/O colonoscopy 7/12    Other postprocedural status S/P hemorrhoidectomy

## 2024-01-20 NOTE — ED PROVIDER NOTE - NS ED ATTENDING STATEMENT MOD
I have seen and examined this patient and fully participated in the care of this patient as the teaching attending.  The service was shared with the ANA.  I reviewed and verified the documentation.

## 2024-01-20 NOTE — ED ADULT TRIAGE NOTE - CHIEF COMPLAINT QUOTE
brought by ems for generalized weakness since four days and rt leg pain . h/o Cardiac, HTN, Dm, CHF stents times three, prostate issues. pt just came from [facility] today , has not seen a doctor over a year .

## 2024-01-20 NOTE — ED ADULT NURSE NOTE - NSFALLHARMRISKINTERV_ED_ALL_ED

## 2024-01-20 NOTE — ED PROVIDER NOTE - CARE PLAN
1 Principal Discharge DX:	DVT, lower extremity  Secondary Diagnosis:	Inability to walk  Secondary Diagnosis:	Adult failure to thrive  Secondary Diagnosis:	Acute renal insufficiency

## 2024-01-20 NOTE — H&P ADULT - NSICDXPASTMEDICALHX_GEN_ALL_CORE_FT
PAST MEDICAL HISTORY:  CAD S/P percutaneous coronary angioplasty     Diabetes mellitus type II, non insulin dependent     HTN (hypertension)     Hyperlipidemia     Stage 3 chronic kidney disease

## 2024-01-20 NOTE — ED PROVIDER NOTE - CLINICAL SUMMARY MEDICAL DECISION MAKING FREE TEXT BOX
83M w/ PMH DM2, HTN, HLD, CAD (s/p 3 stents), BPH who presents to ED w/ generalized weakness x 5 days. Pt's daughter at bedside providing additional history. Pt was in [facility] for the past year, returned yesterday which was 16 hour travel time/flight, pt did not seek any medical care while away. Pt reporting worsening bilateral leg pain/swelling, right leg is more painful/swollen per patient, pt has been ambulatory w/ cane for the past 5-6 years, but over the past 5 days pt has been unable to ambulate even w/ cane due to generalized weakness and LE pain. Patient currently afebrile, hemodynamically stable, spO2 100%. On PE - pt well-appearing, in no acute distress, heart w/ RRR, chest symmetrical, non-labored breathing, lungs clear bilaterally, abdomen soft/non-distended/non-tender to palpation. Based on history and physical, differentials include but are not limited to viral illness, COVID/Flu, electrolyte abnormality, anemia, DVT, ACS, CHF. Plan to assess patient for acute pathology as listed above with EKG, Labs, CXR, Duplex Venous US, CT Head. Will administer medications for symptomatic relief, follow-up on results, and reassess. Disposition pending workup/re-evaluation.

## 2024-01-20 NOTE — ED PROVIDER NOTE - OBJECTIVE STATEMENT
83M w/ PMH DM2, HTN, HLD, CAD (s/p 3 stents), BPH who presents to ED w/ generalized weakness x 5 days. Pt's daughter at bedside providing additional history. Pt was in [facility] for the past year, returned yesterday which was 16 hour travel time/flight, pt did not seek any medical care while away. Pt reporting worsening bilateral leg pain/swelling, right leg is more painful/swollen per patient, pt has been ambulatory w/ cane for the past 5-6 years, but over the past 5 days pt has been unable to ambulate even w/ cane due to generalized weakness and LE pain.

## 2024-01-21 NOTE — PATIENT PROFILE ADULT - FALL HARM RISK - HARM RISK INTERVENTIONS
Assistance with ambulation/Assistance OOB with selected safe patient handling equipment/Communicate Risk of Fall with Harm to all staff/Discuss with provider need for PT consult/Monitor gait and stability/Provide patient with walking aids - walker, cane, crutches/Reinforce activity limits and safety measures with patient and family/Tailored Fall Risk Interventions/Use of alarms - bed, chair and/or voice tab/Visual Cue: Yellow wristband and red socks/Bed in lowest position, wheels locked, appropriate side rails in place/Call bell, personal items and telephone in reach/Instruct patient to call for assistance before getting out of bed or chair/Non-slip footwear when patient is out of bed/[place] to call system/Physically safe environment - no spills, clutter or unnecessary equipment/Purposeful Proactive Rounding/Room/bathroom lighting operational, light cord in reach

## 2024-01-21 NOTE — PROGRESS NOTE ADULT - SUBJECTIVE AND OBJECTIVE BOX
CHIEF COMPLAINT: Pain right leg and inability to ambulate  no chest pain or sob  no fever  no n/v/d  no active gross bleeding reported       PHYSICAL EXAM:    GENERAL: Moderately built, no acute distress   CHEST/LUNG:  No wheezing, no crackles   HEART: Regular rate and rhythm; No murmurs  ABDOMEN: Soft, Nontender, Nondistended; Bowel sounds present  EXTREMITIES:  No clubbing, cyanosis, or edema  NERVOUS SYSTEM:  Grossly non focal.  Psychiatry: AAO x 3, mood is appropriate       OBJECTIVE DATA:   Vital Signs Last 24 Hrs  T(C): 36.7 (21 Jan 2024 07:39), Max: 37.2 (20 Jan 2024 23:06)  T(F): 98 (21 Jan 2024 07:39), Max: 98.9 (20 Jan 2024 23:06)  HR: 67 (21 Jan 2024 07:39) (67 - 80)  BP: 180/91 (21 Jan 2024 07:39) (159/71 - 185/92)  BP(mean): --  RR: 18 (21 Jan 2024 07:39) (15 - 18)  SpO2: 97% (21 Jan 2024 07:39) (96% - 98%)    Parameters below as of 21 Jan 2024 07:39  Patient On (Oxygen Delivery Method): room air               Daily Height in cm: 170.18 (20 Jan 2024 17:44)    Daily   LABS:                        12.0   5.89  )-----------( 188      ( 20 Jan 2024 19:40 )             36.0             01-21    142  |  112<H>  |  22  ----------------------------<  103<H>  3.5   |  23  |  1.64<H>    Ca    7.5<L>      21 Jan 2024 06:10  Mg     2.3     01-20    TPro  7.2  /  Alb  2.8<L>  /  TBili  0.3  /  DBili  x   /  AST  15  /  ALT  21  /  AlkPhos  101  01-20                Urinalysis Basic - ( 21 Jan 2024 06:10 )    Color: x / Appearance: x / SG: x / pH: x  Gluc: 103 mg/dL / Ketone: x  / Bili: x / Urobili: x   Blood: x / Protein: x / Nitrite: x   Leuk Esterase: x / RBC: x / WBC x   Sq Epi: x / Non Sq Epi: x / Bacteria: x            CAPILLARY BLOOD GLUCOSE      POCT Blood Glucose.: 86 mg/dL (21 Jan 2024 08:17)         Interval Radiology studies: reviewed by me    < from: US Duplex Venous Lower Ext Complete, Bilateral (01.20.24 @ 21:18) >  Right: At the knee nonocclusive deep venous thrombosis; distal femoral   and popliteal veins.    LEFT: No evidence for deep venous thrombosis.    < end of copied text >      MEDICATIONS  (STANDING):  amLODIPine   Tablet 10 milliGRAM(s) Oral daily  apixaban 10 milliGRAM(s) Oral two times a day  aspirin enteric coated 81 milliGRAM(s) Oral daily  atorvastatin 40 milliGRAM(s) Oral at bedtime  dextrose 5%. 1000 milliLiter(s) (50 mL/Hr) IV Continuous <Continuous>  dextrose 5%. 1000 milliLiter(s) (100 mL/Hr) IV Continuous <Continuous>  dextrose 50% Injectable 25 Gram(s) IV Push once  dextrose 50% Injectable 12.5 Gram(s) IV Push once  dextrose 50% Injectable 25 Gram(s) IV Push once  glucagon  Injectable 1 milliGRAM(s) IntraMuscular once  influenza  Vaccine (HIGH DOSE) 0.7 milliLiter(s) IntraMuscular once  insulin lispro (ADMELOG) corrective regimen sliding scale   SubCutaneous three times a day before meals  losartan 50 milliGRAM(s) Oral once  metoprolol succinate ER 50 milliGRAM(s) Oral daily  ticagrelor 90 milliGRAM(s) Oral two times a day    MEDICATIONS  (PRN):  acetaminophen     Tablet .. 650 milliGRAM(s) Oral every 6 hours PRN Temp greater or equal to 38C (100.4F), Mild Pain (1 - 3)  aluminum hydroxide/magnesium hydroxide/simethicone Suspension 30 milliLiter(s) Oral every 4 hours PRN Dyspepsia  dextrose Oral Gel 15 Gram(s) Oral once PRN Blood Glucose LESS THAN 70 milliGRAM(s)/deciliter  hydrALAZINE Injectable 10 milliGRAM(s) IV Push every 6 hours PRN sbp>160  melatonin 3 milliGRAM(s) Oral at bedtime PRN Insomnia  ondansetron Injectable 4 milliGRAM(s) IV Push every 8 hours PRN Nausea and/or Vomiting       CHIEF COMPLAINT: Pain right leg and inability to ambulate  no chest pain or sob  no fever  no n/v/d  no active gross bleeding reported   ([facility]  [id number])    PHYSICAL EXAM:    GENERAL: Moderately built, no acute distress   CHEST/LUNG:  No wheezing, no crackles   HEART: Regular rate and rhythm; No murmurs  ABDOMEN: Soft, Nontender, Nondistended; Bowel sounds present  EXTREMITIES:  No clubbing, cyanosis, or edema  NERVOUS SYSTEM:  Grossly non focal.  Psychiatry: AAO x 3, mood is appropriate       OBJECTIVE DATA:   Vital Signs Last 24 Hrs  T(C): 36.7 (21 Jan 2024 07:39), Max: 37.2 (20 Jan 2024 23:06)  T(F): 98 (21 Jan 2024 07:39), Max: 98.9 (20 Jan 2024 23:06)  HR: 67 (21 Jan 2024 07:39) (67 - 80)  BP: 180/91 (21 Jan 2024 07:39) (159/71 - 185/92)  BP(mean): --  RR: 18 (21 Jan 2024 07:39) (15 - 18)  SpO2: 97% (21 Jan 2024 07:39) (96% - 98%)    Parameters below as of 21 Jan 2024 07:39  Patient On (Oxygen Delivery Method): room air               Daily Height in cm: 170.18 (20 Jan 2024 17:44)    Daily   LABS:                        12.0   5.89  )-----------( 188      ( 20 Jan 2024 19:40 )             36.0             01-21    142  |  112<H>  |  22  ----------------------------<  103<H>  3.5   |  23  |  1.64<H>    Ca    7.5<L>      21 Jan 2024 06:10  Mg     2.3     01-20    TPro  7.2  /  Alb  2.8<L>  /  TBili  0.3  /  DBili  x   /  AST  15  /  ALT  21  /  AlkPhos  101  01-20                Urinalysis Basic - ( 21 Jan 2024 06:10 )    Color: x / Appearance: x / SG: x / pH: x  Gluc: 103 mg/dL / Ketone: x  / Bili: x / Urobili: x   Blood: x / Protein: x / Nitrite: x   Leuk Esterase: x / RBC: x / WBC x   Sq Epi: x / Non Sq Epi: x / Bacteria: x            CAPILLARY BLOOD GLUCOSE      POCT Blood Glucose.: 86 mg/dL (21 Jan 2024 08:17)         Interval Radiology studies: reviewed by me    < from: US Duplex Venous Lower Ext Complete, Bilateral (01.20.24 @ 21:18) >  Right: At the knee nonocclusive deep venous thrombosis; distal femoral   and popliteal veins.    LEFT: No evidence for deep venous thrombosis.    < end of copied text >      MEDICATIONS  (STANDING):  amLODIPine   Tablet 10 milliGRAM(s) Oral daily  apixaban 10 milliGRAM(s) Oral two times a day  aspirin enteric coated 81 milliGRAM(s) Oral daily  atorvastatin 40 milliGRAM(s) Oral at bedtime  dextrose 5%. 1000 milliLiter(s) (50 mL/Hr) IV Continuous <Continuous>  dextrose 5%. 1000 milliLiter(s) (100 mL/Hr) IV Continuous <Continuous>  dextrose 50% Injectable 25 Gram(s) IV Push once  dextrose 50% Injectable 12.5 Gram(s) IV Push once  dextrose 50% Injectable 25 Gram(s) IV Push once  glucagon  Injectable 1 milliGRAM(s) IntraMuscular once  influenza  Vaccine (HIGH DOSE) 0.7 milliLiter(s) IntraMuscular once  insulin lispro (ADMELOG) corrective regimen sliding scale   SubCutaneous three times a day before meals  losartan 50 milliGRAM(s) Oral once  metoprolol succinate ER 50 milliGRAM(s) Oral daily  ticagrelor 90 milliGRAM(s) Oral two times a day    MEDICATIONS  (PRN):  acetaminophen     Tablet .. 650 milliGRAM(s) Oral every 6 hours PRN Temp greater or equal to 38C (100.4F), Mild Pain (1 - 3)  aluminum hydroxide/magnesium hydroxide/simethicone Suspension 30 milliLiter(s) Oral every 4 hours PRN Dyspepsia  dextrose Oral Gel 15 Gram(s) Oral once PRN Blood Glucose LESS THAN 70 milliGRAM(s)/deciliter  hydrALAZINE Injectable 10 milliGRAM(s) IV Push every 6 hours PRN sbp>160  melatonin 3 milliGRAM(s) Oral at bedtime PRN Insomnia  ondansetron Injectable 4 milliGRAM(s) IV Push every 8 hours PRN Nausea and/or Vomiting

## 2024-01-22 NOTE — PHYSICAL THERAPY INITIAL EVALUATION ADULT - PERTINENT HX OF CURRENT PROBLEM, REHAB EVAL
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    Patient is Divehi-speaking but declined  services and preferred son-in-law to translate. As per son-in-law at bedside, patient flew back to [place] from [facility] today after a 24 hour plane ride. As he was trying to get off the plane, he was unable to ambulate. Baseline ambulates with walker. Due to this, son-in-law drove straight here to the ER from the airport. B/l LE venous doppler with R. at the knee nonocclusive deep venous thrombosis; distal femoral and popliteal veins.

## 2024-01-22 NOTE — PHYSICAL THERAPY INITIAL EVALUATION ADULT - GAIT DEVIATIONS NOTED, PT EVAL
increased time in double stance/decreased velocity of limb motion/decreased step length/decreased weight-shifting ability

## 2024-01-22 NOTE — PHYSICAL THERAPY INITIAL EVALUATION ADULT - ADDITIONAL COMMENTS
Pt lives with family in a private house with 4 steps to enter + rails. Once inside, the pt main bedroom and bathroom is on that floor when entering. The pts ambulates with a cane all the time.

## 2024-01-22 NOTE — OCCUPATIONAL THERAPY INITIAL EVALUATION ADULT - PERSONAL SAFETY AND JUDGMENT, REHAB EVAL
required verbal cues for hand/foot/walker placement and task segmentation./impaired/at risk behaviors demonstrated

## 2024-01-22 NOTE — PROGRESS NOTE ADULT - SUBJECTIVE AND OBJECTIVE BOX
CHIEF COMPLAINT: FU of right leg DVT  right leg pain controlled. + bilateral knee pain  no sob   no chest pain  no fever  no active gross bleeding  generalized weakness+   Wife at bedside     PHYSICAL EXAM:    GENERAL: Moderately built, no acute distress   CHEST/LUNG:  No wheezing, no crackles   HEART: Regular rate and rhythm; No murmurs  ABDOMEN: Soft, Nontender, Nondistended; Bowel sounds present  EXTREMITIES:  No clubbing, cyanosis, or edema  Psychiatry: AAO x 3, mood is appropriate       OBJECTIVE DATA:   Vital Signs Last 24 Hrs  T(C): 36.6 (22 Jan 2024 11:07), Max: 37.1 (21 Jan 2024 16:00)  T(F): 97.8 (22 Jan 2024 11:07), Max: 98.7 (21 Jan 2024 16:00)  HR: 65 (22 Jan 2024 11:07) (65 - 75)  BP: 107/57 (22 Jan 2024 11:07) (107/57 - 134/74)  BP(mean): --  RR: 18 (22 Jan 2024 11:07) (17 - 20)  SpO2: 96% (22 Jan 2024 11:07) (94% - 98%)    Parameters below as of 22 Jan 2024 11:07  Patient On (Oxygen Delivery Method): room air               Daily Height in cm: 165.1 (22 Jan 2024 00:42)    LABS:                        11.6   6.29  )-----------( 197      ( 22 Jan 2024 07:41 )             35.5             01-22    139  |  110<H>  |  22  ----------------------------<  119<H>  3.3<L>   |  23  |  1.51<H>    Ca    7.8<L>      22 Jan 2024 07:41  Mg     2.3     01-20    TPro  7.2  /  Alb  2.8<L>  /  TBili  0.3  /  DBili  x   /  AST  15  /  ALT  21  /  AlkPhos  101  01-20                Urinalysis Basic - ( 22 Jan 2024 07:41 )    Color: x / Appearance: x / SG: x / pH: x  Gluc: 119 mg/dL / Ketone: x  / Bili: x / Urobili: x   Blood: x / Protein: x / Nitrite: x   Leuk Esterase: x / RBC: x / WBC x   Sq Epi: x / Non Sq Epi: x / Bacteria: x            CAPILLARY BLOOD GLUCOSE      POCT Blood Glucose.: 206 mg/dL (22 Jan 2024 11:31)         Interval Radiology studies: reviewed by me    MEDICATIONS  (STANDING):  amLODIPine   Tablet 10 milliGRAM(s) Oral daily  apixaban 10 milliGRAM(s) Oral two times a day  aspirin enteric coated 81 milliGRAM(s) Oral daily  atorvastatin 40 milliGRAM(s) Oral at bedtime  cyanocobalamin 1000 MICROGram(s) Oral daily  dextrose 5%. 1000 milliLiter(s) (100 mL/Hr) IV Continuous <Continuous>  dextrose 5%. 1000 milliLiter(s) (50 mL/Hr) IV Continuous <Continuous>  dextrose 50% Injectable 25 Gram(s) IV Push once  dextrose 50% Injectable 12.5 Gram(s) IV Push once  dextrose 50% Injectable 25 Gram(s) IV Push once  ergocalciferol [id number] Unit(s) Oral every week  folic acid 1 milliGRAM(s) Oral daily  glucagon  Injectable 1 milliGRAM(s) IntraMuscular once  influenza  Vaccine (HIGH DOSE) 0.7 milliLiter(s) IntraMuscular once  insulin lispro (ADMELOG) corrective regimen sliding scale   SubCutaneous three times a day before meals  losartan 100 milliGRAM(s) Oral daily  metoprolol succinate ER 50 milliGRAM(s) Oral daily  ticagrelor 90 milliGRAM(s) Oral two times a day    MEDICATIONS  (PRN):  acetaminophen     Tablet .. 650 milliGRAM(s) Oral every 6 hours PRN Temp greater or equal to 38C (100.4F), Mild Pain (1 - 3)  aluminum hydroxide/magnesium hydroxide/simethicone Suspension 30 milliLiter(s) Oral every 4 hours PRN Dyspepsia  dextrose Oral Gel 15 Gram(s) Oral once PRN Blood Glucose LESS THAN 70 milliGRAM(s)/deciliter  hydrALAZINE Injectable 10 milliGRAM(s) IV Push every 6 hours PRN sbp>160  melatonin 3 milliGRAM(s) Oral at bedtime PRN Insomnia  ondansetron Injectable 4 milliGRAM(s) IV Push every 8 hours PRN Nausea and/or Vomiting

## 2024-01-22 NOTE — OCCUPATIONAL THERAPY INITIAL EVALUATION ADULT - GENERAL OBSERVATIONS, REHAB EVAL
Pt was encountered supine in bed; NAD, AXOX3, followed commands, cooperative; pt c/o pain which limits pts ability to perform functional tasks/transfers and mobility. PT Carlos present during evaluation. Pt is [place] speaking and requires [employer]  service. OT/PT Carlos spoke [facility] ID: [id number].

## 2024-01-22 NOTE — PHYSICAL THERAPY INITIAL EVALUATION ADULT - TRANSFER TRAINING, PT EVAL
Patient is aware and order canceled.    Independent in  transfer ability bed to chair and vice versa using appropriate assistive device  and prevent falls.

## 2024-01-22 NOTE — CONSULT NOTE ADULT - ASSESSMENT
CKD 3A:  He follows with Dr Shore. Cr baseline is 1.3.  Possible Diabetic Nephropathy.  - Random UPC.  - BP control.  Outpatient follow up.    SUDHIR Stage 1:  Likely due to dehydration, improved with hydration.  - Follow up BMP.  - Avoid nephrotoxins.    Davin Pryor MD  Nephrology

## 2024-01-22 NOTE — PHYSICAL THERAPY INITIAL EVALUATION ADULT - GENERAL OBSERVATIONS, REHAB EVAL
Pt found semisupine in bed. Language Line used for [facility] Translation (Ashlee # [id number]). OT Salvatore present for safe pt handling. +B LE edema. A&Ox4, cooperative, follows commands.

## 2024-01-22 NOTE — CONSULT NOTE ADULT - SUBJECTIVE AND OBJECTIVE BOX
MD DAHIANA  Patient is a 83y old  Male who presents with a chief complaint of RLE DVT, SUDHIR on CKD stage IIIa, physical deconditioning (21 Jan 2024 08:28)    HPI:  MD Fong is an 83 year old male with PMHx of HTN, HLD, CAD (s/p PCI x 2 in 11/2018), NIDDM2, and CKD stage IIIa who presented to the ED on 1/20/24 for complaints of inability to ambulate.    Patient is Malay-speaking but declined  services and preferred son-in-law to translate. As per son-in-law at bedside, patient flew back to [place] from [facility] today after a 24 hour plane ride. As he was trying to get off the plane, he was unable to ambulate. Baseline ambulates with walker. Due to this, son-in-law drove straight here to the ER from the airport.     In the ED, VSS except BP as elevated as 174/107. Hgb 12.0, potassium 3.2, BUN 24, Cr 1.93. RVP negative. NCHCT without acute intracranial findings. CT A/P without bowel obstruction and with overdistention of bladder without wall thickening or stones. B/l LE venous doppler with R. at the knee nonocclusive deep venous thrombosis; distal femoral and popliteal veins. Received 1L NS bolus. (20 Jan 2024 23:45)    PAST MEDICAL & SURGICAL HISTORY:  Hyperlipidemia      HTN (hypertension)      CAD S/P percutaneous coronary angioplasty      Diabetes mellitus type II, non insulin dependent      Stage 3 chronic kidney disease      H/O colonoscopy  7/12      Other postprocedural status  S/P hemorrhoidectomy        MEDICATIONS  (STANDING):  amLODIPine   Tablet 10 milliGRAM(s) Oral daily  apixaban 10 milliGRAM(s) Oral two times a day  aspirin enteric coated 81 milliGRAM(s) Oral daily  atorvastatin 40 milliGRAM(s) Oral at bedtime  cyanocobalamin 1000 MICROGram(s) Oral daily  dextrose 5%. 1000 milliLiter(s) (50 mL/Hr) IV Continuous <Continuous>  dextrose 5%. 1000 milliLiter(s) (100 mL/Hr) IV Continuous <Continuous>  dextrose 50% Injectable 25 Gram(s) IV Push once  dextrose 50% Injectable 12.5 Gram(s) IV Push once  dextrose 50% Injectable 25 Gram(s) IV Push once  ergocalciferol 73219 Unit(s) Oral every week  folic acid 1 milliGRAM(s) Oral daily  glucagon  Injectable 1 milliGRAM(s) IntraMuscular once  influenza  Vaccine (HIGH DOSE) 0.7 milliLiter(s) IntraMuscular once  insulin lispro (ADMELOG) corrective regimen sliding scale   SubCutaneous three times a day before meals  losartan 100 milliGRAM(s) Oral daily  metoprolol succinate ER 50 milliGRAM(s) Oral daily  potassium chloride   Powder 40 milliEquivalent(s) Oral once  ticagrelor 90 milliGRAM(s) Oral two times a day    Allergies    No Known Allergies    Intolerances      FAMILY HISTORY:  Family history of diabetes mellitus (Father, Mother, Sibling, Child, Grandparent, Aunt)  Family history of diabetes mellitus    Family history of cardiovascular disease (Father, Mother, Sibling, Child, Grandparent, Aunt)  Family history of hypertension        REVIEW OF SYSTEMS    General:  No fever, chills or night sweats. Weakness.    Ophthalmologic: No changes in vision.  	  ENMT: No difficulty swallowing. 	    Respiratory and Thorax: No cough, wheezes or dyspnea.  	  Cardiovascular: No chest pains, tiredness or palpitations.	    Gastrointestinal: No dyspepsia, constipation or diarrhea.	    Genitourinary:	 No dysuria, hematuria or frequency.    Musculoskeletal: Leg pains    Neurological:	No weakness or numbness. No seizures.    Hematology/Lymphatics: No heat or cold intolerance.    Endocrine: No polyuria or polydipsia.	      Vital Signs Last 24 Hrs  T(C): 36.6 (22 Jan 2024 05:31), Max: 37.1 (21 Jan 2024 16:00)  T(F): 97.8 (22 Jan 2024 05:31), Max: 98.7 (21 Jan 2024 16:00)  HR: 73 (22 Jan 2024 05:31) (68 - 75)  BP: 134/74 (22 Jan 2024 05:31) (119/66 - 180/92)  BP(mean): --  RR: 17 (22 Jan 2024 05:31) (17 - 20)  SpO2: 96% (22 Jan 2024 05:31) (96% - 98%)    Parameters below as of 22 Jan 2024 05:31  Patient On (Oxygen Delivery Method): room air        PHYSICAL EXAMINATION:  Constitutional:   appears comfortable and not distressed. Not diaphoretic.    Neck:  The thyroid is normal. Trachea is midline.     Breasts: Normal examination.    Respiratory: The lungs are clear to auscultation. No dullness and expansion is normal.    Cardiovascular: S1 and S2 are normal. No mummurs, rubs or gallops are present.    Gastrointestinal: The abdomen is soft. No tenderness is present. No masses are present. Bowel sounds are normal.    Genitourinary: The bladder is not distended. No CVA tenderness is present.    Extremities: No edema is noted. No deformities are present.    Neurological: Cognition is normal. Tone, power and sensation are normal. Gait is steady.    Skin: No leasions are seen  or palpated.    Lymph Nodes: No lymphadenopathy is present.    Psychiatric: Mood is appropriate. No hallucinations or flight of ideas are noted.                            11.6   6.29  )-----------( 197      ( 22 Jan 2024 07:41 )             35.5     01-22    139  |  110<H>  |  22  ----------------------------<  119<H>  3.3<L>   |  23  |  1.51<H>    Ca    7.8<L>      22 Jan 2024 07:41  Mg     2.3     01-20    TPro  7.2  /  Alb  2.8<L>  /  TBili  0.3  /  DBili  x   /  AST  15  /  ALT  21  /  AlkPhos  101  01-20    LIVER FUNCTIONS - ( 20 Jan 2024 19:01 )  Alb: 2.8 g/dL / Pro: 7.2 gm/dL / ALK PHOS: 101 U/L / ALT: 21 U/L / AST: 15 U/L / GGT: x           < from: CT Abdomen and Pelvis No Cont (01.20.24 @ 21:07) >  KIDNEYS/URETERS: No hydronephrosis or urolithiasis. Subcentimeter   hypodense or hyperdense cysts bilaterally. 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      < end of copied text >  < from: US Duplex Venous Lower Ext Complete, Bilateral (01.20.24 @ 21:18) >  Right: At the knee nonocclusive deep venous thrombosis; distal femoral   and popliteal veins.    LEFT: No evidence for deep venous thrombosis.    < end of copied text >  Urinalysis + Microscopic Examination (01.21.24 @ 03:18)   pH Urine: 6.5  Urine Appearance: Clear  Color: Yellow  Specific Gravity: 1.019  Protein, Urine: 100 mg/dL  Glucose Qualitative, Urine: >=1000 mg/dL  Ketone - Urine: Negative mg/dL  Blood, Urine: Negative  Bilirubin: Negative  Urobilinogen: 1.0 mg/dL  Leukocyte Esterase Concentration: Negative  Nitrite: Negative  White Blood Cell - Urine: 1 /HPF  Red Blood Cell - Urine: 1 /HPF  Bacteria: Occasional /HPF

## 2024-01-22 NOTE — OCCUPATIONAL THERAPY INITIAL EVALUATION ADULT - PERTINENT HX OF CURRENT PROBLEM, REHAB EVAL
As per H&P; MD Ajit is an 83 year old male with PMHx of HTN, HLD, CAD (s/p PCI x 2 in 11/2018), NIDDM2, and CKD stage IIIa who presented to the ED on 1/20/24 for complaints of inability to ambulate.    Patient is Tajik-speaking but declined  services and preferred son-in-law to translate. As per son-in-law at bedside, patient flew back to [place] from [facility] today after a 24 hour plane ride. As he was trying to get off the plane, he was unable to ambulate. Baseline ambulates with walker. Due to this, son-in-law drove straight here to the ER from the airport.     In the ED, VSS except BP as elevated as 174/107. Hgb 12.0, potassium 3.2, BUN 24, Cr 1.93. RVP negative. NCHCT without acute intracranial findings. CT A/P without bowel obstruction and with overdistention of bladder without wall thickening or stones. B/l LE venous doppler with R. at the knee nonocclusive deep venous thrombosis; distal femoral and popliteal veins. Received 1L NS bolus.

## 2024-01-23 NOTE — PROGRESS NOTE ADULT - ASSESSMENT
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    RLE acute distal femoral and popliteal DVT  Complaints of inability to ambulate after landing in [place] today after long 24 hour flight  cont eliquis. no active gross bleeding     SUDHIR on CKD stage IIIa  BUN 24 and Cr 1.93 on admission, baseline Cr ~1.4-1.5  CT A/P without evidence of hydronephrosis  s/p IVF.     Physical deconditioning  Complaints of inability to ambulate today, baseline ambulates with walker  PT/OT consulted>>LAURE. per care manager/, JIAN sent.     Acute metabolic encephalopathy, resolved  As per son-in-law, patient did not appear like himself today  RVP negative, NCHCT without acute intracranial findings, CT A/P without infectious etiology    AOCD. macrocytic anemia. low normal vitamin B12 and folate levels. Cont supplements.     Vitamin D deficiency. Cont vitamin D.     Chronic medical conditions:   HTN. controlled. cont current meds. Monitor.   HLD: PTA atorvastatin 20 mg  CAD (s/p PCI x 2 in 11/2018): PTA ASA 81 mg, ticagrelor 90 mg BID, consult cardiology given now on triple therapy and increased fall risk - please call in AM  NIDDM2: last known A1c 7.2 (on 9/12/19), POC qac and qhs, PTA glimepiride 1 mg held, low dose SSI qac, blood glucose goal < 180    Full code  Medically ready for discharge. pending LAURE placement. 
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    RLE acute distal femoral and popliteal DVT  Complaints of inability to ambulate after landing in [place] today after long 24 hour flight  cont eliquis and monitor for any active gross bleeding. check CBC in am.     SUDHIR on CKD stage IIIa  BUN 24 and Cr 1.93 on admission, baseline Cr ~1.4-1.5  CT A/P without evidence of hydronephrosis  s/p IVF.   trend creatinine.     Physical deconditioning  Complaints of inability to ambulate today, baseline ambulates with walker  PT/OT consulted    Acute metabolic encephalopathy, resolved  As per son-in-law, patient did not appear like himself today  RVP negative, NCHCT without acute intracranial findings, CT A/P without infectious etiology    AOCD. macrocytic anemia. check vitamin B12 and folate levels.     Chronic medical conditions:   HTN, uncontrolled: increase losartan. cont other meds. added iv hydralazine prn.   HLD: PTA atorvastatin 20 mg  CAD (s/p PCI x 2 in 11/2018): PTA ASA 81 mg, ticagrelor 90 mg BID, consult cardiology given now on triple therapy and increased fall risk - please call in AM  NIDDM2: last known A1c 7.2 (on 9/12/19), POC qac and qhs, PTA glimepiride 1 mg held, low dose SSI qac started, blood glucose goal < 180, f/u A1c    Plan of care discussed with son-in-law at bedside.      Full code  Time spent by me managing the patient including but not limited to reviewing the chart, discussion with the nurse and Family, physical exam and assessment and plan is 54 mins   
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    RLE acute distal femoral and popliteal DVT  Complaints of inability to ambulate after landing in [place] today after long 24 hour flight  cont eliquis and monitor for any active gross bleeding. prescription sent to see if patient cant afford it.     SUDHIR on CKD stage IIIa  BUN 24 and Cr 1.93 on admission, baseline Cr ~1.4-1.5  CT A/P without evidence of hydronephrosis  s/p IVF.     Physical deconditioning  Complaints of inability to ambulate today, baseline ambulates with walker  PT/OT consulted>>LAURE. per care manager/, JIAN sent.     Acute metabolic encephalopathy, resolved  As per son-in-law, patient did not appear like himself today  RVP negative, NCHCT without acute intracranial findings, CT A/P without infectious etiology    AOCD. macrocytic anemia. low normal vitamin B12 and folate levels. start supplements.     Vitamin D deficiency. Start vitamin D.     Chronic medical conditions:   HTN. controlled. cont current meds. Monitor.   HLD: PTA atorvastatin 20 mg  CAD (s/p PCI x 2 in 11/2018): PTA ASA 81 mg, ticagrelor 90 mg BID, consult cardiology given now on triple therapy and increased fall risk - please call in AM  NIDDM2: last known A1c 7.2 (on 9/12/19), POC qac and qhs, PTA glimepiride 1 mg held, low dose SSI qac, blood glucose goal < 180, f/u A1c
CKD 3A:  He follows with Dr Shore. Cr baseline is 1.3.  Possible Diabetic Nephropathy.  - Random UPC.  - BP control.  - Outpatient follow up after discharge.    SUDHIR Stage 1:  Likely due to dehydration, improved with hydration.  - Follow up BMP.  - Avoid nephrotoxins.    Davin Pryor MD  Nephrology

## 2024-01-23 NOTE — PROGRESS NOTE ADULT - REASON FOR ADMISSION
RLE DVT, SUDHIR on CKD stage IIIa, physical deconditioning

## 2024-01-23 NOTE — PROGRESS NOTE ADULT - SUBJECTIVE AND OBJECTIVE BOX
MD DAHIANA  83y  Patient is a 83y old  Male who presents with a chief complaint of RLE DVT, SUDHIR on CKD stage IIIa, physical deconditioning (23 Jan 2024 11:29)    HPI:  Admitted for DVT, followed for SUDHIR on CKD.    HEALTH ISSUES - PROBLEM Dx:  Leg DVT (deep venous thromboembolism), acute, right    Acute kidney injury superimposed on CKD    Physical deconditioning    HTN (hypertension)    HLD (hyperlipidemia)    CAD S/P percutaneous coronary angioplasty    Diabetes mellitus type II, non insulin dependent          MEDICATIONS  (STANDING):  amLODIPine   Tablet 10 milliGRAM(s) Oral daily  apixaban 10 milliGRAM(s) Oral two times a day  aspirin enteric coated 81 milliGRAM(s) Oral daily  atorvastatin 40 milliGRAM(s) Oral at bedtime  cyanocobalamin 1000 MICROGram(s) Oral daily  dextrose 5%. 1000 milliLiter(s) (50 mL/Hr) IV Continuous <Continuous>  dextrose 5%. 1000 milliLiter(s) (100 mL/Hr) IV Continuous <Continuous>  dextrose 50% Injectable 25 Gram(s) IV Push once  dextrose 50% Injectable 12.5 Gram(s) IV Push once  dextrose 50% Injectable 25 Gram(s) IV Push once  ergocalciferol 36492 Unit(s) Oral every week  folic acid 1 milliGRAM(s) Oral daily  glucagon  Injectable 1 milliGRAM(s) IntraMuscular once  influenza  Vaccine (HIGH DOSE) 0.7 milliLiter(s) IntraMuscular once  insulin lispro (ADMELOG) corrective regimen sliding scale   SubCutaneous three times a day before meals  losartan 100 milliGRAM(s) Oral daily  metoprolol succinate ER 50 milliGRAM(s) Oral daily  ticagrelor 90 milliGRAM(s) Oral two times a day    MEDICATIONS  (PRN):  acetaminophen     Tablet .. 650 milliGRAM(s) Oral every 6 hours PRN Temp greater or equal to 38C (100.4F), Mild Pain (1 - 3)  aluminum hydroxide/magnesium hydroxide/simethicone Suspension 30 milliLiter(s) Oral every 4 hours PRN Dyspepsia  dextrose Oral Gel 15 Gram(s) Oral once PRN Blood Glucose LESS THAN 70 milliGRAM(s)/deciliter  hydrALAZINE Injectable 10 milliGRAM(s) IV Push every 6 hours PRN sbp>160  melatonin 3 milliGRAM(s) Oral at bedtime PRN Insomnia  ondansetron Injectable 4 milliGRAM(s) IV Push every 8 hours PRN Nausea and/or Vomiting    Vital Signs Last 24 Hrs  T(C): 36.2 (23 Jan 2024 11:45), Max: 36.7 (23 Jan 2024 06:21)  T(F): 97.1 (23 Jan 2024 11:45), Max: 98.1 (23 Jan 2024 06:21)  HR: 69 (23 Jan 2024 11:45) (66 - 72)  BP: 110/61 (23 Jan 2024 11:45) (110/61 - 123/61)  BP(mean): --  RR: 20 (23 Jan 2024 11:45) (18 - 20)  SpO2: 98% (23 Jan 2024 11:45) (94% - 98%)    Parameters below as of 22 Jan 2024 17:28  Patient On (Oxygen Delivery Method): room air      Daily     Daily     PHYSICAL EXAM:  Constitutional: He appears comfortable and not distressed. Not diaphoretic.    Neck:  The thyroid is normal. Trachea is midline.     Respiratory: The lungs are clear to auscultation. No dullness and expansion is normal.    Cardiovascular: S1 and S2 are normal. No murmurs, rubs or gallops are present.    Gastrointestinal: The abdomen is soft. No tenderness is present. No masses are present. Bowel sounds are normal.    Genitourinary: The bladder is not distended. No CVA tenderness is present.    Extremities: No edema is noted. No deformities are present.    Neurological: Cognition is normal. Tone, power and sensation are normal. Gait is steady.    Skin: No lesions are seen  or palpated.    Lymph Nodes: No lymphadenopathy is present.    Psychiatric: Mood is appropriate. No hallucinations or flight of ideas are noted.                              11.6   6.29  )-----------( 197      ( 22 Jan 2024 07:41 )             35.5     01-22    139  |  110<H>  |  22  ----------------------------<  119<H>  3.3<L>   |  23  |  1.51<H>    Ca    7.8<L>      22 Jan 2024 07:41  Urinalysis + Microscopic Examination (01.21.24 @ 03:18)   pH Urine: 6.5  Urine Appearance: Clear  Color: Yellow  Specific Gravity: 1.019  Protein, Urine: 100 mg/dL  Glucose Qualitative, Urine: >=1000 mg/dL  Ketone - Urine: Negative mg/dL  Blood, Urine: Negative  Bilirubin: Negative  Urobilinogen: 1.0 mg/dL  Leukocyte Esterase Concentration: Negative  Nitrite: Negative  White Blood Cell - Urine: 1 /HPF  Red Blood Cell - Urine: 1 /HPF  Bacteria: Occasional /HPF

## 2024-01-23 NOTE — DISCHARGE NOTE PROVIDER - NSDCMRMEDTOKEN_GEN_ALL_CORE_FT
amLODIPine 10 mg oral tablet: 1 tab(s) orally once a day  Aspir 81 oral delayed release tablet: 1 tab(s) orally once a day  atorvastatin 40 mg oral tablet: 1 tab(s) orally once a day (at bedtime)  cyanocobalamin 1000 mcg oral tablet: 1 tab(s) orally once a day  Eliquis Starter Pack for Treatment of DVT and PE 5 mg oral tablet: 2 tab(s) orally 2 times a day for 7 days and then 1 tab 2 times a day  folic acid 1 mg oral tablet: 1 tab(s) orally once a day  glimepiride 1 mg oral tablet: 1 tab(s) orally once a day  losartan 50 mg oral tablet: 1 tab(s) orally once a day  metoprolol succinate 50 mg oral tablet, extended release: 1 tab(s) orally once a day  ticagrelor 90 mg oral tablet: 1 tab(s) orally 2 times a day

## 2024-01-23 NOTE — DISCHARGE NOTE PROVIDER - NSDCCPCAREPLAN_GEN_ALL_CORE_FT
PRINCIPAL DISCHARGE DIAGNOSIS  Diagnosis: DVT, lower extremity  Assessment and Plan of Treatment:       SECONDARY DISCHARGE DIAGNOSES  Diagnosis: Inability to walk  Assessment and Plan of Treatment:     Diagnosis: Adult failure to thrive  Assessment and Plan of Treatment:     Diagnosis: Acute renal insufficiency  Assessment and Plan of Treatment:

## 2024-01-23 NOTE — DISCHARGE NOTE PROVIDER - HOSPITAL COURSE
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      Course:   RLE acute distal femoral and popliteal DVT  Complaints of inability to ambulate after landing in [place] today after long 24 hour flight  cont eliquis. no active gross bleeding     SUDHIR on CKD stage IIIa  BUN 24 and Cr 1.93 on admission, baseline Cr ~1.4-1.5  CT A/P without evidence of hydronephrosis  s/p IVF.     Physical deconditioning  Complaints of inability to ambulate today, baseline ambulates with walker  PT/OT consulted>>LAURE. per care manager/, JIAN sent.     Acute metabolic encephalopathy, resolved  As per son-in-law, patient did not appear like himself today  RVP negative, NCHCT without acute intracranial findings, CT A/P without infectious etiology    AOCD. macrocytic anemia. low normal vitamin B12 and folate levels. Cont supplements.     Vitamin D deficiency. Cont vitamin D.     Chronic medical conditions:   HTN. controlled. cont current meds. Monitor.   HLD: PTA atorvastatin 20 mg  CAD (s/p PCI x 2 in 11/2018): cont current home meds. advised Family to speak with cardiologist for any cardiac meds changes as outpatient.   NIDDM2: last known A1c 7.2 (on 9/12/19), POC qac and qhs, PTA glimepiride 1 mg held, low dose SSI qac, blood glucose goal < 180    Seen and examined by me today. Vitals stable.   I have discussed all the inpatient radiographic findings with the patient and stressed that patient follows with the PCP for further outpatient care. I have educated patient to use [facility] patient portal (as instructed on the discharge paperwork) to access medical records outside the hospital.   All questions welcomed and answered appropriately. Patient verbalized understanding of post discharge physician's follows up and discharge instructions.   DC time spent by me face to face excluding billable procedures 38 mins

## 2024-01-23 NOTE — DISCHARGE NOTE PROVIDER - NSDCFUADDINST_GEN_ALL_CORE_FT
1) It is important to see your primary physician as well as other necessary consultants within 7-10 days post rehab discharge to perform a comprehensive medical review.  Call their offices for an appointment as soon as you leave the hospital.  If you do not have a primary physician or unable to reach your PCP, contact the [facility] Physician Referral Service at [phone].  Your medical issues appear to be stable at this time, but if your symptoms recur or worsen, contact your physicians and/or return to the hospital if necessary.  If you encounter any issues or questions with your medication, call your physicians before stopping the medication.    2) Please access [facility] Patient portal (as instructed on the discharge paperwork) to access your medical records at any time after discharge.     3) Please monitor for any active gross bleeding while on eliquis. call PCP if any active gross bleeding

## 2024-01-23 NOTE — DISCHARGE NOTE NURSING/CASE MANAGEMENT/SOCIAL WORK - PATIENT PORTAL LINK FT
You can access the FollowMyHealth Patient Portal offered by [facility] by registering at the following website: [email or web address]. By joining [facility]’s FollowMyHealth portal, you will also be able to view your health information using other applications (apps) compatible with our system.

## 2024-01-23 NOTE — DISCHARGE NOTE NURSING/CASE MANAGEMENT/SOCIAL WORK - NSTRANSFEREYEGLASSESPAIRS_GEN_A_NUR
Very reassuring holter without evidence of significant arhythmia, she does have many pvc's though these are typically caused by stress/ caffeine.  Should her symptoms worsen let us know otherwise would recommend limiting caffeine intake
1 pair

## 2024-01-23 NOTE — PROGRESS NOTE ADULT - SUBJECTIVE AND OBJECTIVE BOX
CHIEF COMPLAINT: FU of right leg DVT  Right leg pain better.   no sob   no chest pain  no fever    PHYSICAL EXAM:  GENERAL: Moderately built, no acute distress   CHEST/LUNG:  No wheezing, no crackles   HEART: Regular rate and rhythm; No murmurs  ABDOMEN: Soft, Nontender, Nondistended; Bowel sounds present  Psychiatry: AAO x 3, mood is appropriate       OBJECTIVE DATA:     Vital Signs Last 24 Hrs  T(C): 36.7 (23 Jan 2024 06:21), Max: 36.7 (23 Jan 2024 06:21)  T(F): 98.1 (23 Jan 2024 06:21), Max: 98.1 (23 Jan 2024 06:21)  HR: 72 (23 Jan 2024 06:21) (65 - 72)  BP: 123/61 (23 Jan 2024 06:21) (107/57 - 123/61)  BP(mean): --  RR: 18 (23 Jan 2024 06:21) (18 - 18)  SpO2: 96% (23 Jan 2024 06:21) (94% - 97%)    Parameters below as of 22 Jan 2024 17:28  Patient On (Oxygen Delivery Method): room air               Daily     Daily   LABS:                        11.6   6.29  )-----------( 197      ( 22 Jan 2024 07:41 )             35.5             01-22    139  |  110<H>  |  22  ----------------------------<  119<H>  3.3<L>   |  23  |  1.51<H>    Ca    7.8<L>      22 Jan 2024 07:41                  Urinalysis Basic - ( 22 Jan 2024 07:41 )    Color: x / Appearance: x / SG: x / pH: x  Gluc: 119 mg/dL / Ketone: x  / Bili: x / Urobili: x   Blood: x / Protein: x / Nitrite: x   Leuk Esterase: x / RBC: x / WBC x   Sq Epi: x / Non Sq Epi: x / Bacteria: x           CAPILLARY BLOOD GLUCOSE      POCT Blood Glucose.: 123 mg/dL (23 Jan 2024 07:57)      MEDICATIONS  (STANDING):  amLODIPine   Tablet 10 milliGRAM(s) Oral daily  apixaban 10 milliGRAM(s) Oral two times a day  aspirin enteric coated 81 milliGRAM(s) Oral daily  atorvastatin 40 milliGRAM(s) Oral at bedtime  cyanocobalamin 1000 MICROGram(s) Oral daily  dextrose 5%. 1000 milliLiter(s) (50 mL/Hr) IV Continuous <Continuous>  dextrose 5%. 1000 milliLiter(s) (100 mL/Hr) IV Continuous <Continuous>  dextrose 50% Injectable 25 Gram(s) IV Push once  dextrose 50% Injectable 12.5 Gram(s) IV Push once  dextrose 50% Injectable 25 Gram(s) IV Push once  ergocalciferol 67861 Unit(s) Oral every week  folic acid 1 milliGRAM(s) Oral daily  glucagon  Injectable 1 milliGRAM(s) IntraMuscular once  influenza  Vaccine (HIGH DOSE) 0.7 milliLiter(s) IntraMuscular once  insulin lispro (ADMELOG) corrective regimen sliding scale   SubCutaneous three times a day before meals  losartan 100 milliGRAM(s) Oral daily  metoprolol succinate ER 50 milliGRAM(s) Oral daily  ticagrelor 90 milliGRAM(s) Oral two times a day    MEDICATIONS  (PRN):  acetaminophen     Tablet .. 650 milliGRAM(s) Oral every 6 hours PRN Temp greater or equal to 38C (100.4F), Mild Pain (1 - 3)  aluminum hydroxide/magnesium hydroxide/simethicone Suspension 30 milliLiter(s) Oral every 4 hours PRN Dyspepsia  dextrose Oral Gel 15 Gram(s) Oral once PRN Blood Glucose LESS THAN 70 milliGRAM(s)/deciliter  hydrALAZINE Injectable 10 milliGRAM(s) IV Push every 6 hours PRN sbp>160  melatonin 3 milliGRAM(s) Oral at bedtime PRN Insomnia  ondansetron Injectable 4 milliGRAM(s) IV Push every 8 hours PRN Nausea and/or Vomiting

## 2024-01-26 PROBLEM — I25.10 ATHEROSCLEROTIC HEART DISEASE OF NATIVE CORONARY ARTERY WITHOUT ANGINA PECTORIS: Chronic | Status: ACTIVE | Noted: 2024-01-01

## 2024-01-26 PROBLEM — E11.9 TYPE 2 DIABETES MELLITUS WITHOUT COMPLICATIONS: Chronic | Status: ACTIVE | Noted: 2024-01-01

## 2024-01-26 PROBLEM — I10 ESSENTIAL (PRIMARY) HYPERTENSION: Chronic | Status: ACTIVE | Noted: 2024-01-01

## 2024-01-26 PROBLEM — N18.30 CHRONIC KIDNEY DISEASE, STAGE 3 UNSPECIFIED: Chronic | Status: ACTIVE | Noted: 2024-01-01

## 2024-01-26 NOTE — ED ADULT NURSE REASSESSMENT NOTE - NS ED NURSE REASSESS COMMENT FT1
pt is awake watching the news on his phone, pt is ax2 ,on 4L of NC, family is at bedside, bed is in low position, call light within reach, no other concerns are noted. pt is more awake watching the news on his phone, pt is ax2 , pt is able to say his name, and his birthday, pt is on 4L of NC, family is at bedside, bed is in low position, pt and family aware of POC call light within reach, no other concerns are noted.

## 2024-01-26 NOTE — ED PROVIDER NOTE - PHYSICAL EXAMINATION
General: Well appearing male in no acute distress  HEENT: Normocephalic, atraumatic. Moist mucous membranes. Oropharynx clear. No lymphadenopathy.  Eyes: No scleral icterus. EOMI. KATE.  Neck:. Soft and supple. Full ROM without pain. No midline tenderness  Cardiac: Regular rate and regular rhythm. No murmurs, rubs, gallops. Peripheral pulses 2+ and symmetric. No LE edema.  Resp: Lungs CTAB. No wheezes, rales or rhonchi.  Abd: Soft, non-tender, non-distended. No guarding or rebound. No scars, masses, or lesions.  Back: Spine midline and non-tender. No CVA tenderness.    Skin: No rashes, abrasions, or lacerations.  Neuro: arousable to verbal and painful stimuli. Moves all extremities symmetrically. Motor strength and sensation grossly intact.

## 2024-01-26 NOTE — H&P ADULT - ASSESSMENT
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    Sepsis secondary to suspected HCAP  Complaints of cough and fever x 2 days, recent discharge from [facility] 3 days ago  Temp 102.6, RR 26, WBC 23.09K on admission  VBG 7.44 / 36 / 48 / 24, COVID/influenza negative  CTA chest without central pulmonary embolus and with small b/l pleural effusions and mild interstitial edema and bibasilar dependent atelectatic changes  S/p 1L LR bolus, cefepime 2 g IV, and vancomycin 1 g IV in the ED  Empirically continued on cefepime 2 g q8 and vancomycin 750 mg, dosing discussed with pharmacy given CrCl 25  Antitussives with benzonatate 100 mg q8 started, incentive spirometry  F/u blood cultures, sputum culture, procal, MRSA/MSSA PCR, urine Legionella, Mycoplasma  Monitor fever curve and WBC trend  Telemetry    SUDHIR on CKD stage IIIa, suspect etiology prerenal due to decreased PO intake  Reports decreased PO intake x 2 days due to coughing so much  BUN 30 and Cr 2.09 on admission, baseline Cr ~1.4-1.5  S/p 1L LR bolus in the ED  Avoid nephrotoxins, renally dose meds  Encourage PO hydration  Monitor renal function    Multinodular thyroid, incidental finding  As seen on CTA chest  F/u TSH, thyroid U/S      Chronic medical conditions:  HTN: PTA metoprolol succinate 50 mg, losartan 50 mg held due to SUDHIR  HLD: PTA atorvastatin 40 mg  CAD (s/p PCI x 2 in 11/2018): PTA ASA 81 mg, ticagrelor 90 mg BID, atorvastatin 40 mg  NIDDM2: last known A1c 7.0 (on 1/21/24), POC qac and qhs, PTA glimepiride 1 mg held, low dose SSI qac started, blood glucose goal < 180  RLE DVT: PTA apixaban 10 mg BID x 1 more day, 5 mg BID to start on 1/27/24  Macrocytic anemia, suspect secondary to vitamin B12 deficiency: hgb 10.0 and  on admission, baseline ~12?, no signs of bleeding, monitor hgb, PTA cyanocobalamin 1000 mcg     Medication reconciliation completed using discharge med rec from 1/23/24. Patient reports he has not had any medication changes since then.    Plan of care discussed with daughter and daughter's friend at bedside.

## 2024-01-26 NOTE — ED ADULT NURSE NOTE - NSSEPSISCRITERIAMET_ED_A_ED
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    Soto Andrade, EMT  1:20 PM   Abnormal VS & WBC

## 2024-01-26 NOTE — ED PROVIDER NOTE - QTC
This patient was seen at the request of the attending psychiatrist, Dr. Ken Bateman MD for history and physical medical consultation clearance.  SOURCE OF INFORMATION:  I based this report upon my review of written and electronic medical record, met with interdisciplinary team, and upon my interview and assessment of the patient's clinical condition and patient interview and presentation       History and Physical    Patient: Kerry Cortez  Date of Service: 3/20/2022    :     1999  PCP: Alyssa Lugo DO       Subjective:     Chief Complaint:  Suicidal ideations and depression    HPI:  Patient is a 22-year-old female with history of depression, anxiety, admitted to [facility] for worsening depression anxiety with suicidal ideations.  Patient reports that she was an inpatient [facility] for 3 days and was discharged are early and she does not feel safe now.  Her patient reports significant insomnia for the past 2-4 weeks.Patient endorses symptoms of Depression:  depressed mood, poor concentration, lack of energy, decreased appetite, weight loss, hopelessness, helplessness, worthlessness, decreased interest, impairments in functioning. + Suicidal ideations/plans.  no  Homicidal ideations. Patient endorses symptoms of Anxiety include :  feeling nervous, anxious or on edge, not able to control racing thoughts, restlessness, excessive worrying, being easily irritated or agitated  and not able to relax. Psychosis:  no auditory hallucinations, no visual hallucinations and paranoia.    He denies use of any illicit drugs.  She denies smoking cigarettes or using nicotine products.  She denies drinking alcohol.  Patients Medical Complaints include:  1. Vitamin-D deficiency  2. Complains of nasal congestion and dry cough  3. Acid reflux symptoms    Past Medical History:   Diagnosis Date   • Anxiety    • Depression            Prior to Admission medications    Medication Sig Start Date End Date Taking?  Authorizing Provider   sertraline (ZOLOFT) 100 MG tablet Take 100 mg by mouth daily.   Yes Outside Provider   QUEtiapine (SEROquel) 100 MG tablet Take 100 mg by mouth at bedtime.    Yes Outside Provider   Cholecalciferol (Vitamin D3) 125 mcg (5,000 units) tablet Take 125 mcg by mouth daily.   Yes Outside Provider   hydrOXYzine (ATARAX) 50 MG tablet Take 1 tablet by mouth every 6 hours as needed for Anxiety. 3/4/22   KAVYA Santos       Current IP Meds (From admission, onward)            Start     Stop Status Route Frequency Ordered    03/19/22 1337  aluminum-magnesium hydroxide-simethicone (MAALOX) 200-200-20 MG/5ML suspension 30 mL         -- Verified PO EVERY 4 HOURS PRN 03/19/22 1337    03/19/22 1337  benztropine (COGENTIN) tablet 1 mg  (Benztropine IM or PO)        \"Or\" Linked Group Details    -- Verified PO EVERY 4 HOURS PRN 03/19/22 1337    03/19/22 1337  benztropine mesylate (COGENTIN) 1 MG/ML injection 1 mg  (Benztropine IM or PO)        \"Or\" Linked Group Details    -- Verified IM EVERY 4 HOURS PRN 03/19/22 1337    03/19/22 1337  bisacodyl (DULCOLAX) suppository 10 mg  (docusate sodium-sennosides (SENOKOT S), bisacodyl (DULCOLAX), and magnesium hydroxide (MILK OF MAGNESIA) for symptoms refractory to polyethylene glycol)         -- Verified RE DAILY PRN 03/19/22 1337    03/19/22 1337  docusate sodium-sennosides (SENOKOT S) 50-8.6 MG 2 tablet  (docusate sodium-sennosides (SENOKOT S), bisacodyl (DULCOLAX), and magnesium hydroxide (MILK OF MAGNESIA) for symptoms refractory to polyethylene glycol)         -- Verified PO 2 TIMES DAILY PRN 03/19/22 1337    03/19/22 1337  hydrOXYzine (ATARAX) tablet 50 mg         -- Verified PO EVERY 4 HOURS PRN 03/19/22 1337    03/19/22 1337  ibuprofen (MOTRIN) tablet 400 mg         -- Verified PO EVERY 6 HOURS PRN 03/19/22 1337    03/19/22 1337  loperamide (IMODIUM) capsule 2 mg         -- Verified PO PRN 03/19/22 1337    03/19/22 1337  LORazepam (ATIVAN) injection  2 mg  (Lorazepam IM or PO)        \"Or\" Linked Group Details    -- Verified IM EVERY 4 HOURS PRN 03/19/22 1337    03/19/22 1337  LORazepam (ATIVAN) tablet 2 mg  (Lorazepam IM or PO)        \"Or\" Linked Group Details    -- Verified PO EVERY 4 HOURS PRN 03/19/22 1337    03/19/22 1337  magnesium hydroxide (MILK OF MAGNESIA) 400 MG/5ML suspension 30 mL  (docusate sodium-sennosides (SENOKOT S), bisacodyl (DULCOLAX), and magnesium hydroxide (MILK OF MAGNESIA) for symptoms refractory to polyethylene glycol)         -- Verified PO DAILY PRN 03/19/22 1337    03/19/22 1337  OLANZapine (ZyPREXA ZYDIS) disintegrating tablet 5 mg         -- Verified PO EVERY 4 HOURS PRN 03/19/22 1337    03/19/22 1337  ondansetron (ZOFRAN ODT) disintegrating tablet 4 mg         -- Verified PO 2 TIMES DAILY PRN 03/19/22 1337    03/19/22 2100  QUEtiapine (SEROquel) tablet 100 mg        Note to Pharmacy: OP SIG:Take 100 mg by mouth nightly.      -- Verified PO NIGHTLY 03/19/22 1337    03/19/22 2100  traZODone (DESYREL) tablet 50 mg         -- Verified PO NIGHTLY PRN 03/19/22 1337    03/19/22 1800  vitamin D3 (CHOLECALCIFEROL) 1.25 MG (37385 UT) capsule 1.25 mg         -- Sent PO Once per day on Sat 03/19/22 1432          ALLERGIES:  No Known Allergies    Family history:  Not significant    Social History     Tobacco Use   • Smoking status: Former Smoker     Packs/day: 0.25     Types: Cigarettes     Start date: 5/1/2015   • Smokeless tobacco: Never Used   • Tobacco comment: quit 6 months ago- previous half pack a day for year   Substance Use Topics   • Alcohol use: No     Alcohol/week: 2.0 - 4.0 standard drinks     Types: 1 - 2 Cans of beer, 1 - 2 Shots of liquor per week     Comment: social    • Drug use: No       Review of Systems  CONSTITUTIONAL: Denies unintentional weight change, fatigue, fever, problematic headaches.  EYES:  Denies visual blurring, double vision.  ENT: Denies chronic sinus problems,dysphagia.  + rhinitis/nasal congestion.  CV:   Denies chest discomfort with exertion, SOB, palpitations.  RESPIRATORY: Denies  SOB.  +dry cough .  GI:  Denies abdominal pain, frequent nausea, vomiting, diarrhea, constipation, hematemesis, hematochezia, melena.  +acid reflux symptoms  : Denies frequency, dysuria.  MSK: Denies joint pains, Denies muscle aches  NEURO:  Denies muscle weakness or paralysis, numbness or tingling, seizures.  PSYCH: See HPI above.  ENDOCRINE:  Denies polyphagia, polydipsia, polyuria.  HEME/LYMPH:  Denies abnormal bruising or bleeding, lumps or bumps.  ALLERGY/IMMUN: Denies chronic sinus problems, chronic nasal problems.    All other systems reviewed and negative.      Objective:     Visit Vitals  /71 (BP Location: LUE - Left upper extremity, Patient Position: Standing)   Pulse (!) 105   Temp 99.1 °F (37.3 °C) (Oral)   Resp 16   Ht 5' (1.524 m)   Wt 61.2 kg (135 lb)   LMP 02/20/2022 (Approximate)   SpO2 97%   BMI 26.37 kg/m²       General Appearance:  Alert, cooperative, no distress, appears stated age.  Head:  Normocephalic, non tender and atraumatic  Eyes: PERRL, no icterus, EOM's intact.  Ears:  Hearing appears intact. Bilateral normal appearing tympanic membranes;  Nose:  Edematous appearing nasal mucosa; clear nasal congestion   Throat:  Normal oropharynx;    Neck: Supple, symmetrical, trachea midline, no adenopathy. Thyroid: no enlargement/tenderness/nodules.  Back: ROM normal, no CVA tenderness. No spine tenderness;   Lungs: Clear to auscultation bilaterally, respirations unlabored.  Heart: Regular rate and rhythm, S1 and S2 normal, no murmur, rub or gallop.  Abdomen: Soft, non-tender, bowel sounds active, no masses, no Hepatosplenomegaly.  Extremities: Extremities normal, atraumatic, no cyanosis. No stiffness, swelling.   Skin: Skin color, texture, turgor normal,   Lymph nodes: Cervical nodes and axillary lymph nodes normal.  Genitorectal: Deferred.  Neurologic:   Alert and oriented x3.  No gross deficit of sensory or  cranial nerves II through XII:  CRANIAL NERVE:   II: Acuity and visual fields are normal.  III, IV, VI:  External ocular movements are normal, and there is no presence of any nystagmus or ptosis.  Pupils are of equal size, regularity, and react equally to the light and accommodation.  No evidence of divergent or convergent strabismus or diplopia when looking down or to either side.  V:   Facial sensation is intact and equal.  There is no deviation of the jaw or weakness of masseter or temporal muscles.  VII: Facial expression, nasolabial folds, forehead wrinkle are normal.  VIII: Hearing is intact.  There is no evidence of nystagmus and cerebellar function is intact.  IX:  Normal gag reflex.  X:   No evidence of deviation of the soft palate or laryngeal paralysis.    XI:  Shrug of shoulders is equal and strong.  XII: No deviation of the protruded tongue.  No evidence of atrophy or fine fibrillation.  Finger-nose test is normal.   Rhomberg sign is negative.  Motor Strength 5/5 in all extremities. Sensory intact.   Cerebellar intact; Mental status normal.  Gait and station normal. Tremors none      Data Review:      CBC  Recent Labs   Lab 03/20/22  0702   WBC 7.2   RBC 4.55   HGB 13.9   HCT 41.0   MCV 90.1   MCH 30.5   MCHC 33.9   RDWCV 12.6      NRBCRE 0   SEG 67   TLYMPH 24   PMON 8   PEOS 1   PBASO 0   ANEUT 4.8   ALYMS 1.7   JORDAN 0.6   AEOS 0.1   ABASO 0.0       CMP  Recent Labs   Lab 03/20/22  0702   SODIUM 140   POTASSIUM 4.2   CHLORIDE 106   CO2 27   ANIONGAP 11   GLUCOSE 89   BUN 17   CREATININE 0.73   BCRAT 23   CALCIUM 9.3   ALBUMIN 4.0   BILIRUBIN 0.8   ALKPT 88   AST 9   GPT 22   GLOB 3.6   AGR 1.1       Lipid Panel  Recent Labs   Lab 03/20/22  0702   CHOLESTEROL 168   CALCLDL 122*   HDL 34*   TRIGLYCERIDE 60   NONHDL 134   CHOHDL 4.9*       Urinalysis  No results found    Other  Recent Labs   Lab 03/20/22  0702   TSH 1.869        Assessment:     1. Anxiety    2. Severe episode of recurrent major  depressive disorder, without psychotic features (CMS/HCC)    3. JESUSITA (generalized anxiety disorder)    4. Gastroesophageal reflux disease, unspecified whether esophagitis present    5. Vitamin D deficiency    6. Acute rhinitis          Plan:   1. Vitals:  Stable  2. Labs:  Reviewed  3. Zyrtec/Flonase for rhinitis with nasal congestion  4. Tessalon Perles for cough p.r.n.   5. Vitamin-D supplementation as ordered for vitamin-D deficiency  6. Protonix as ordered for the treatment of GERD    Patient is cleared for treatment here, and advised to follow up with primary care provider for medical concerns.    Thank you, Dr. Ken Bateman MD, for your kind referral.       Primitivo Woody MD  3/20/2022  \   401

## 2024-01-26 NOTE — PATIENT PROFILE ADULT - NSPROPOAURINARYCATHETER_GEN_A_NUR
Spray Paint Technique: No Medical Necessity Clause: This procedure was medically necessary because the lesions that were treated were: Spray Paint Text: The liquid nitrogen was applied to the skin utilizing a spray paint frosting technique. Show Applicator Variable?: Yes Medical Necessity Information: It is in your best interest to select a reason for this procedure from the list below. All of these items fulfill various CMS LCD requirements except the new and changing color options. Post-Care Instructions: I reviewed with the patient in detail post-care instructions. Patient is to wear sunprotection, and avoid picking at any of the treated lesions. Pt may apply Vaseline to crusted or scabbing areas. Detail Level: Detailed Consent: The patient's consent was obtained including but not limited to risks of crusting, scabbing, blistering, scarring, darker or lighter pigmentary change, recurrence, incomplete removal and infection. no

## 2024-01-26 NOTE — ED PROVIDER NOTE - CLINICAL SUMMARY MEDICAL DECISION MAKING FREE TEXT BOX
84 y/o M hx of DVT on eliquis, just discharged 2 days ago fro [facility] for RLE DVT, SUDHIR and physical deconditioning, DM, CAD s/p PCI, HLD presents for shortness of breath. daughter at bedside to provide collateral. states he had fever and began to have trouble breathing. states he appears more confused now compared to his baseline. was at rehab when they placed him on 2L nasal cannula for hypoxia.   patient arousable to painful/verbal stimuli but not speaking currently. on 4L nasal cannula due to hypoxia.   CTA - r/o PE. rectal temp obtained, febrile to 102.8. sepsis labs, empiric dose of vanc/cefepime for possible HA-PNA. check ua/uc. covid swab.  1L fluid bolus given hx of CKD and elevated BNP compared to baseline - concern for renal dsiease/volume overload. patient is normotensive, does not need the full 30 cc/kg fluid bolus at this time. fever control, cultures.     results reviewed, admit for hypoxia w/ O2 requirement and PNA.   EKG reviewed, no stemi/ischemia. 84 y/o M hx of DVT on eliquis, just discharged 2 days ago fro [facility] for RLE DVT, SUDHIR and physical deconditioning, DM, CAD s/p PCI, HLD presents for shortness of breath. daughter at bedside to provide collateral. states he had fever and began to have trouble breathing. states he appears more confused now compared to his baseline. was at rehab when they placed him on 2L nasal cannula for hypoxia.   patient arousable to painful/verbal stimuli but not speaking currently. on 4L nasal cannula due to hypoxia.   CTA - r/o PE. rectal temp obtained, febrile to 102.8. sepsis labs, empiric dose of vanc/cefepime for possible HA-PNA. check ua/uc. covid swab.  1L fluid bolus given hx of CKD and elevated BNP compared to baseline - concern for renal dsiease/volume overload. patient is normotensive, does not need the full 30 cc/kg fluid bolus at this time. fever control, cultures.     results reviewed, admit for hypoxia w/ O2 requirement and PNA. admit to medicine, dr. castañeda.   EKG reviewed, no stemi/ischemia.

## 2024-01-26 NOTE — ED PROVIDER NOTE - OBJECTIVE STATEMENT
84 y/o M hx of DVT on eliquis, just discharged 2 days ago fro [facility] for RLE DVT, SUDHIR and physical deconditioning, DM, CAD s/p PCI, HLD presents for shortness of breath. daughter at bedside to provide collateral. states he had fever and began to have trouble breathing. states he appears more confused now compared to his baseline. was at rehab when they placed him on 2L nasal cannula for hypoxia.    unable to obtain hx from patient due to current medical state. daughter giving collateral.

## 2024-01-26 NOTE — ED PROVIDER NOTE - CRITICAL CARE ATTENDING CONTRIBUTION TO CARE
Upon my evaluation, this patient had a high probability of imminent or life-threatening deterioration due to acute hypoxic respiratory failure, SUDHIR on CKD , which required my direct attention, intervention, and personal management.  The patient has a  medical condition that impairs one or more vital organ systems.  Frequent personal assessment and adjustment of medical interventions was performed.      I have personally provided 30 minutes of critical care time exclusive of time spent on separately billable procedures. Time includes review of laboratory data, radiology results, discussion with consultants, patient and family; monitoring for potential decompensation, as well as time spent retrieving data and reviewing the chart and documenting the visit. Interventions were performed as documented above.

## 2024-01-26 NOTE — ED ADULT NURSE REASSESSMENT NOTE - NS ED NURSE REASSESS COMMENT FT1
pts family is at bedside, family made aware of POC so far, pt is connected to the monitor, bed in low position, call light within reach, pt is still lethargic and only responding pain and stimuli, no other concerns are noted.

## 2024-01-26 NOTE — ED ADULT NURSE NOTE - NSFALLHARMRISKINTERV_ED_ALL_ED
Assistance OOB with selected safe patient handling equipment if applicable/Assistance with ambulation/Communicate risk of Fall with Harm to all staff, patient, and family/Monitor gait and stability/Monitor for mental status changes and reorient to person, place, and time, as needed/Move patient closer to nursing station/within visual sight of ED staff/Provide patient with walking aids/Provide visual cue: red socks, yellow wristband, yellow gown, etc/Reinforce activity limits and safety measures with patient and family/Toileting schedule using arm’s reach rule for commode and bathroom/Use of alarms - bed, stretcher, chair and/or video monitoring/Bed in lowest position, wheels locked, appropriate side rails in place/Call bell, personal items and telephone in reach/Instruct patient to call for assistance before getting out of bed/chair/stretcher/Non-slip footwear applied when patient is off stretcher/[place] to call system/Physically safe environment - no spills, clutter or unnecessary equipment/Purposeful Proactive Rounding/Room/bathroom lighting operational, light cord in reach

## 2024-01-26 NOTE — ED ADULT TRIAGE NOTE - CHIEF COMPLAINT QUOTE
Discharged from [facility] for DVT and transferred to [facility] for rehab, as per daughter, patient has been himself for three days, fever and chills and cough. Started on 2L NC yesterday. f/s 154 warm to touch in triage

## 2024-01-26 NOTE — ED ADULT NURSE NOTE - CHIEF COMPLAINT QUOTE
Discharged from [facility] for DVT and transferred to [place] [facility] for rehab, as per daughter, patient has been himself for three days, fever and chills and cough. Started on 2L NC yesterday. f/s 154 warm to touch in triage

## 2024-01-26 NOTE — ED ADULT NURSE NOTE - OBJECTIVE STATEMENT
pt is a 83 y.o. male arrived from rehab facility c/o dyspnea, lethargic, and altered mental status, pt is not talking, but responds to pain and stimuli, pt is ax0, pt is on 4L of NC, but per daughter pt is usually not on oxygen, pt breath sounds are shallow, and labored, pt was seen in the hospital for DVT and sent to rehab for mobility, per daughter the last few days pt has not been talking, pt just arrived back from [facility] from a vacation per daughter, and pt has not been like himself. pt is connected to the monitor, bed in low position, call light within reach, daughter is at bedside. pt arrived with a IV in the right upper arm, IV has not been used while here.

## 2024-01-26 NOTE — H&P ADULT - NSHPLABSRESULTS_GEN_ALL_CORE
10.0   23.09 )-----------( 226      ( 26 Jan 2024 13:44 )             30.1   01-26    140  |  111<H>  |  30<H>  ----------------------------<  153<H>  3.7   |  20<L>  |  2.09<H>    Ca    7.9<L>      26 Jan 2024 13:44    TPro  6.5  /  Alb  2.3<L>  /  TBili  0.6  /  DBili  x   /  AST  31  /  ALT  21  /  AlkPhos  104  01-26    Urinalysis Basic - ( 26 Jan 2024 13:44 )    Color: x / Appearance: x / SG: x / pH: x  Gluc: 153 mg/dL / Ketone: x  / Bili: x / Urobili: x   Blood: x / Protein: x / Nitrite: x   Leuk Esterase: x / RBC: x / WBC x   Sq Epi: x / Non Sq Epi: x / Bacteria: x    CT head without contrast 1/26/24  IMPRESSION:  No significant interval change.    No acute intracranial  hemorrhage or midline shift.    Moderate to extensive white matter small vessel ischemic disease. Generalized volume loss.    CTA chest PE protocol with IV contrast 1/26/24  FINDINGS:  LUNGS AND AIRWAYS: Patent central airways.  Low lung volumes. Small bilateral layering pleural effusions with bibasilar dependent atelectatic changes. Correlate clinically for concomitant aspiration/infection. Mildly prominent subpleural intralobular septa at the lung bases suggesting mild interstitial edema.  PLEURA: As above.  MEDIASTINUM AND PASCUAL: No lymphadenopathy. Patulous thoracic esophagus with fluid and debris suggesting gastroesophageal reflux. Multinodular thyroid. Correlate with ultrasound  VESSELS: Satisfactory bolus. Evaluation of the peripheral (segmental, subsegmental) branches limited by beam hardening artifact. No central pulmonary emboli. Nonaneurysmal thoracic aorta. Normal caliber pulmonary artery.  HEART: Mild cardiomegaly with coronary artery calcification. No pericardial effusion.  CHEST WALL AND LOWER NECK: Within normal limits.  VISUALIZED UPPER ABDOMEN: Stable hypodensities caudate and left hepatic lobe likely cysts.  BONES: Degenerative changes. Stable mild chronic T11 compression deformity    IMPRESSION:  Limited for peripheral emboli.  No central pulmonary embolus.  Small bilateral pleural effusions and mild interstitial edema.  Bibasilar dependent atelectatic changes. Correlate clinically for concomitant infection/aspiration.  Patulous fluid and debris filled thoracic esophagus suggesting gastroesophageal reflux  Additional findings as discussed

## 2024-01-26 NOTE — H&P ADULT - NSHPPHYSICALEXAM_GEN_ALL_CORE
T(C): 38.4 (01-26-24 @ 15:44), Max: 39.2 (01-26-24 @ 15:09)  HR: 85 (01-26-24 @ 15:44) (84 - 86)  BP: 107/71 (01-26-24 @ 15:44) (107/71 - 129/72)  RR: 26 (01-26-24 @ 15:44) (16 - 26)  SpO2: 91% (01-26-24 @ 15:44) (91% - 95%)    CONSTITUTIONAL: Well groomed, pleasant, coughing  EYES: PERRLA and symmetric, EOMI  ENMT: Oral mucosa with dry membranes  RESP: tachypneic, diminished breath sounds b/l, on 4L NC  CV: RRR  GI: Soft, NT, ND  NEURO: alert

## 2024-01-26 NOTE — H&P ADULT - NSICDXPASTMEDICALHX_GEN_ALL_CORE_FT
PAST MEDICAL HISTORY:  CAD S/P percutaneous coronary angioplasty     Diabetes mellitus type II, non insulin dependent     HTN (hypertension)     Hyperlipidemia     Right leg DVT     Stage 3 chronic kidney disease

## 2024-01-26 NOTE — ED ADULT NURSE REASSESSMENT NOTE - NS ED NURSE REASSESS COMMENT FT1
pt transferred to the floor on the monitor, on 4L of NC by a RN, pt and family aware of POC, pts vitals stable upon transfer, no other concerns are noted.

## 2024-01-26 NOTE — PATIENT PROFILE ADULT - FALL HARM RISK - HARM RISK INTERVENTIONS

## 2024-01-26 NOTE — H&P ADULT - HISTORY OF PRESENT ILLNESS
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     Patient is Azeri speaking but declined  services and preferred daughter to translate. As per daughter, patient has been having nonproductive cough since yesterday. Was not offered any antitussives at rehab. Tmax 101 yesterday which resolved with Tylenol. Today, he was persistently coughing and daughter requested patient to be sent to the ER for further evaluation.    Recent admission from 1/20/24 - 1/23/24 for RLE DVT. Started on apixaban. Course complicated by SUDHIR on CKD stage IIIa. CT A/P without evidence of hydronephrosis. Received IVF with improvement of renal function. Also with transient acute metabolic encephalopathy as per son-in-law. NCHCT without acute intracranial findings. Found to have vitamin B12 deficiency so supplementation was initiated. Discharged to [facility].    In the ED, VSS except Tmax 102.6 and RR as elevated as 26. No documented hypoxia but placed on 4L NC for symptomatic relief? WBC 23.09K, hgb 10.0, bicarb 20, BUN 30, Cr 2.09. Pro-BNP 9986. VBG 7.44 / 36 / 48 / 24. COVID/influenza negative. NCHCT without acute intracranial findings. CTA chest limited for peripheral emboli, no central pulmonary embolus and with small bilateral pleural effusions and mild interstitial edema and bibasilar dependent atelectatic changes. Received 1L LR bolus, cefepime 2 g and vancomycin 1 g IV.

## 2024-01-27 NOTE — CONSULT NOTE ADULT - ASSESSMENT
SUDHIR:  Cr celia from baseline. This occurs in the setting of  suspected pneumonia.  Possible Pre-Renal but will need to rule out ATN.  Also, he has radiographic contrast. Needs UA to rule out ATN.  - Urinalysis ordered.  - U Na ordered.  - Follow up BMP.    CKD 3A:  Possible DN.  - Random UPC.  - follow up BMP.      Davin Pryor MD  Nephrology

## 2024-01-27 NOTE — PROGRESS NOTE ADULT - SUBJECTIVE AND OBJECTIVE BOX
Patient is a 83y old  Male who presents with a chief complaint of Sepsis secondary to suspected HCAP, SUDHIR on CKD stage IIIa (2024 19:52)    INTERVAL HPI/OVERNIGHT EVENTS: Patients seen and examined at bedside this morning. No acute events overnight. Pt reports shortness of breath and productive cough.    MEDICATIONS  (STANDING):  amLODIPine   Tablet 10 milliGRAM(s) Oral daily  apixaban 5 milliGRAM(s) Oral every 12 hours  aspirin enteric coated 81 milliGRAM(s) Oral daily  atorvastatin 40 milliGRAM(s) Oral at bedtime  benzonatate 100 milliGRAM(s) Oral three times a day  cefepime   IVPB 2000 milliGRAM(s) IV Intermittent every 24 hours  cyanocobalamin 1000 MICROGram(s) Oral daily  dextrose 5%. 1000 milliLiter(s) (50 mL/Hr) IV Continuous <Continuous>  dextrose 5%. 1000 milliLiter(s) (100 mL/Hr) IV Continuous <Continuous>  dextrose 50% Injectable 25 Gram(s) IV Push once  dextrose 50% Injectable 25 Gram(s) IV Push once  dextrose 50% Injectable 12.5 Gram(s) IV Push once  folic acid 1 milliGRAM(s) Oral daily  glucagon  Injectable 1 milliGRAM(s) IntraMuscular once  influenza  Vaccine (HIGH DOSE) 0.7 milliLiter(s) IntraMuscular once  insulin lispro (ADMELOG) corrective regimen sliding scale   SubCutaneous three times a day before meals  metoprolol succinate ER 50 milliGRAM(s) Oral daily  ticagrelor 90 milliGRAM(s) Oral every 12 hours  vancomycin  IVPB 750 milliGRAM(s) IV Intermittent every 24 hours    MEDICATIONS  (PRN):  acetaminophen     Tablet .. 650 milliGRAM(s) Oral every 6 hours PRN Temp greater or equal to 38C (100.4F), Mild Pain (1 - 3)  aluminum hydroxide/magnesium hydroxide/simethicone Suspension 30 milliLiter(s) Oral every 4 hours PRN Dyspepsia  dextrose Oral Gel 15 Gram(s) Oral once PRN Blood Glucose LESS THAN 70 milliGRAM(s)/deciliter  melatonin 3 milliGRAM(s) Oral at bedtime PRN Insomnia  ondansetron Injectable 4 milliGRAM(s) IV Push every 8 hours PRN Nausea and/or Vomiting    Allergies    No Known Allergies    Intolerances      REVIEW OF SYSTEMS:  All other systems reviewed and are negative    Vital Signs Last 24 Hrs  T(C): 37.2 (2024 05:10), Max: 39.2 (2024 15:09)  T(F): 99 (2024 05:10), Max: 102.6 (2024 15:09)  HR: 84 (2024 05:10) (79 - 88)  BP: 107/66 (2024 05:10) (107/66 - 132/78)  BP(mean): --  RR: 21 (2024 05:10) (16 - 26)  SpO2: 98% (2024 05:10) (91% - 100%)    Parameters below as of 2024 23:40  Patient On (Oxygen Delivery Method): nasal cannula  O2 Flow (L/min): 3    Daily Height in cm: 165.1 (2024 13:01)    Daily Weight in k (2024 05:10)  I&O's Summary    2024 07:01  -  2024 07:00  --------------------------------------------------------  IN: 0 mL / OUT: 400 mL / NET: -400 mL      CAPILLARY BLOOD GLUCOSE      POCT Blood Glucose.: 135 mg/dL (2024 07:30)  POCT Blood Glucose.: 154 mg/dL (2024 13:07)    PHYSICAL EXAM:  CONSTITUTIONAL: Well groomed, pleasant, coughing  EYES: PERRLA and symmetric, EOMI  ENMT: Oral mucosa with dry membranes  RESP: tachypneic, diminished breath sounds b/l, on 4L NC  CV: RRR  GI: Soft, NT, ND  NEURO: alert      Labs                          9.7    23.32 )-----------( 207      ( 2024 05:23 )             28.7         139  |  110<H>  |  30<H>  ----------------------------<  119<H>  3.3<L>   |  22  |  2.30<H>    Ca    7.8<L>      2024 05:23    TPro  6.5  /  Alb  2.3<L>  /  TBili  0.6  /  DBili  x   /  AST  31  /  ALT  21  /  AlkPhos  104      PT/INR - ( 2024 13:44 )   PT: 21.4 sec;   INR: 1.83 ratio         PTT - ( 2024 13:44 )  PTT:32.9 sec      Urinalysis Basic - ( 2024 05:23 )    Color: x / Appearance: x / SG: x / pH: x  Gluc: 119 mg/dL / Ketone: x  / Bili: x / Urobili: x   Blood: x / Protein: x / Nitrite: x   Leuk Esterase: x / RBC: x / WBC x   Sq Epi: x / Non Sq Epi: x / Bacteria: x                      Radiology and Imaging reviewed.

## 2024-01-27 NOTE — CONSULT NOTE ADULT - SUBJECTIVE AND OBJECTIVE BOX
Davin Pryor MD  Nephrology        MD DAHIANA  Patient is a 83y old  Male who presents with a chief complaint of Sepsis secondary to suspected HCAP, SUDHIR on CKD stage IIIa (27 Jan 2024 09:46)    HPI:  This is a patient with CKD, admitted for Pneumonia. Has worsening of his renal function.  He follows with Dr Shore.      PAST MEDICAL & SURGICAL HISTORY:  Hyperlipidemia      HTN (hypertension)      CAD S/P percutaneous coronary angioplasty      Diabetes mellitus type II, non insulin dependent      Stage 3 chronic kidney disease      Right leg DVT      H/O colonoscopy  7/12      Other postprocedural status  S/P hemorrhoidectomy        MEDICATIONS  (STANDING):  amLODIPine   Tablet 10 milliGRAM(s) Oral daily  apixaban 5 milliGRAM(s) Oral every 12 hours  aspirin enteric coated 81 milliGRAM(s) Oral daily  atorvastatin 40 milliGRAM(s) Oral at bedtime  benzonatate 100 milliGRAM(s) Oral three times a day  cefepime   IVPB 2000 milliGRAM(s) IV Intermittent every 24 hours  cyanocobalamin 1000 MICROGram(s) Oral daily  dextrose 5%. 1000 milliLiter(s) (50 mL/Hr) IV Continuous <Continuous>  dextrose 5%. 1000 milliLiter(s) (100 mL/Hr) IV Continuous <Continuous>  dextrose 50% Injectable 25 Gram(s) IV Push once  dextrose 50% Injectable 25 Gram(s) IV Push once  dextrose 50% Injectable 12.5 Gram(s) IV Push once  folic acid 1 milliGRAM(s) Oral daily  glucagon  Injectable 1 milliGRAM(s) IntraMuscular once  influenza  Vaccine (HIGH DOSE) 0.7 milliLiter(s) IntraMuscular once  insulin lispro (ADMELOG) corrective regimen sliding scale   SubCutaneous three times a day before meals  metoprolol succinate ER 50 milliGRAM(s) Oral daily  ticagrelor 90 milliGRAM(s) Oral every 12 hours  vancomycin  IVPB 750 milliGRAM(s) IV Intermittent every 24 hours    Allergies    No Known Allergies    Intolerances      FAMILY HISTORY:  Family history of diabetes mellitus (Father, Mother, Sibling, Child, Grandparent, Aunt)  Family history of diabetes mellitus    Family history of cardiovascular disease (Father, Mother, Sibling, Child, Grandparent, Aunt)  Family history of hypertension        REVIEW OF SYSTEMS    General:  Fever and weakness.    Ophthalmologic: No changes in vision.  	  ENMT: No difficulty swallowing. 	    Respiratory and Thorax: Dyspnea.  	  Cardiovascular: No chest pains, tiredness or palpitations.	    Gastrointestinal: No dyspepsia, constipation or diarrhea.	    Genitourinary:	 No dysuria, hematuria or frequency.    Musculoskeletal: No joint pains or swelling. No muscle pains.    Neurological:	No weakness or numbness. No seizures.    Hematology/Lymphatics: No heat or cold intolerance.    Endocrine: No polyuria or polydipsia.	      Vital Signs Last 24 Hrs  T(C): 37.2 (27 Jan 2024 05:10), Max: 39.2 (26 Jan 2024 15:09)  T(F): 99 (27 Jan 2024 05:10), Max: 102.6 (26 Jan 2024 15:09)  HR: 84 (27 Jan 2024 05:10) (79 - 88)  BP: 107/66 (27 Jan 2024 05:10) (107/66 - 132/78)  BP(mean): --  RR: 21 (27 Jan 2024 05:10) (16 - 26)  SpO2: 98% (27 Jan 2024 05:10) (91% - 100%)    Parameters below as of 26 Jan 2024 23:40  Patient On (Oxygen Delivery Method): nasal cannula  O2 Flow (L/min): 3      PHYSICAL EXAMINATION:  Constitutional: He appears comfortable and not distressed. Not diaphoretic.    Neck:  The thyroid is normal. Trachea is midline.     Breasts: Normal examination.    Respiratory: The lungs are clear to auscultation. No dullness and expansion is normal.    Cardiovascular: S1 and S2 are normal. No mummurs, rubs or gallops are present.    Gastrointestinal: The abdomen is soft. No tenderness is present. No masses are present. Bowel sounds are normal.    Genitourinary: The bladder is not distended. No CVA tenderness is present.    Extremities: No edema is noted. No deformities are present.    Neurological: Cognition is normal. Tone, power and sensation are normal. Gait is steady.    Skin: No leasions are seen  or palpated.    Lymph Nodes: No lymphadenopathy is present.    Psychiatric: Mood is appropriate. No hallucinations or flight of ideas are noted.                            9.7    23.32 )-----------( 207      ( 27 Jan 2024 05:23 )             28.7     01-27    139  |  110<H>  |  30<H>  ----------------------------<  119<H>  3.3<L>   |  22  |  2.30<H>    Ca    7.8<L>      27 Jan 2024 05:23    Creatinine: 2.30 mg/dL (01.27.24 @ 05:23)   Creatinine: 2.09 mg/dL (01.26.24 @ 13:44)   Creatinine: 1.51 mg/dL (01.22.24 @ 07:41)   Creatinine: 1.64 mg/dL (01.21.24 @ 06:10)   Creatinine: 1.93 mg/dL (01.20.24 @ 19:01)     < from: CT Angio Chest PE Protocol w/ IV Cont (01.26.24 @ 14:44) >  IMPRESSION:  Limited for peripheral emboli.  No central pulmonary embolus.  Small bilateral pleural effusions and mild interstitial edema.  Bibasilar dependent atelectatic changes. Correlate clinically for   concomitant infection/aspiration.  Patulous fluid and debris filled thoracic esophagus suggesting   gastroesophageal reflux  Additional findings as discussed      < end of copied text >  < from: Xray Chest 1 View- PORTABLE-Urgent (Xray Chest 1 View- PORTABLE-Urgent .) (01.26.24 @ 14:37) >  Cardiomegaly.  Bilateral mild pleural effusion blunting costophrenic angles and/or   posterior basilar airspace disease..    < end of copied text >  < from: TTE Echo Doppler w/o Cont (09.12.19 @ 15:15) >   1. Left ventricular ejection fraction, by visual estimation, is 60 to   65%.   2. Technically good study.   3. Normal global left ventricular systolic function.   4. Normal left ventricular internal cavity size.   5. Spectral Doppler shows impaired relaxation pattern of left   ventricular myocardial filling (Grade I diastolic dysfunction).   6. Normal right ventricular size and function.   7. There is no evidence of pericardial effusion.   8. Mild thickening and calcification of the anterior and posterior   mitral valve leaflets.   9. Trace mitral valve regurgitation.    Hao Marina MD FACC, FASE, FACP  Electronically signed on 9/13/2019 at 1:35:06 PM    < end of copied text >      TPro  6.5  /  Alb  2.3<L>  /  TBili  0.6  /  DBili  x   /  AST  31  /  ALT  21  /  AlkPhos  104  01-26    LIVER FUNCTIONS - ( 26 Jan 2024 13:44 )  Alb: 2.3 g/dL / Pro: 6.5 gm/dL / ALK PHOS: 104 U/L / ALT: 21 U/L / AST: 31 U/L / GGT: x           Urinalysis Basic - ( 27 Jan 2024 05:23 )    Color: x / Appearance: x / SG: x / pH: x  Gluc: 119 mg/dL / Ketone: x  / Bili: x / Urobili: x   Blood: x / Protein: x / Nitrite: x   Leuk Esterase: x / RBC: x / WBC x   Sq Epi: x / Non Sq Epi: x / Bacteria: x

## 2024-01-28 NOTE — PROVIDER CONTACT NOTE (EICU) - ACTION/TREATMENT ORDERED:
I have personally provided care, evaluated the patient, reviewed all orders/labs/imaging and/or discussed case with site/Tele provider(s) through telehealth encounter.    Total CC Telehealth Time : 60mins

## 2024-01-28 NOTE — PROGRESS NOTE ADULT - ASSESSMENT
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    Sepsis secondary to suspected HCAP  Complaints of cough and fever x 2 days, recent discharge from [facility] 3 days ago  Temp 102.6, RR 26, WBC 23.09K on admission  VBG 7.44 / 36 / 48 / 24, COVID/influenza negative  CTA chest without central pulmonary embolus and with small b/l pleural effusions and mild interstitial edema and bibasilar dependent atelectatic changes  S/p 1L LR bolus, cefepime 2 g IV, and vancomycin 1 g IV in the ED  Empirically continued on cefepime 2 g q8 and vancomycin 750 mg, dosing discussed with pharmacy given CrCl 25  Antitussives with benzonatate 100 mg q8 started, incentive spirometry  F/u blood cultures, sputum culture, procal, MRSA/MSSA PCR, urine Legionella, Mycoplasma  Monitor fever curve and WBC trend  Telemetry    SUDHIR on CKD stage IIIa, suspect etiology prerenal due to decreased PO intake  Reports decreased PO intake x 2 days due to coughing so much  BUN 30 and Cr 2.09 on admission, baseline Cr ~1.4-1.5  S/p 1L LR bolus in the ED  Avoid nephrotoxins, renally dose meds  Encourage PO hydration  Monitor renal function    Multinodular thyroid, incidental finding  As seen on CTA chest  TSH stable    Chronic medical conditions:  HTN: PTA metoprolol succinate 50 mg, losartan 50 mg held due to SUDHIR  HLD: PTA atorvastatin 40 mg  CAD (s/p PCI x 2 in 11/2018): PTA ASA 81 mg, ticagrelor 90 mg BID, atorvastatin 40 mg  NIDDM2: last known A1c 7.0 (on 1/21/24), POC qac and qhs, PTA glimepiride 1 mg held, low dose SSI qac started, blood glucose goal < 180  RLE DVT: PTA apixaban 10 mg BID x 1 more day, 5 mg BID to start on 1/27/24  Macrocytic anemia, suspect secondary to vitamin B12 deficiency: hgb 10.0 and  on admission, baseline ~12?, no signs of bleeding, monitor hgb, PTA cyanocobalamin 1000 mcg     
SUDHIR:  Cr celia from baseline.  He is Oliguric and Hypotensive. Likely ATN.  - Maintain MAP > 65.  - Follow up BMP.  - Will discuss the possibility of needing Renal Replacement Therapy if his Renal function does not improve with his Family.    CKD 3A:  Possible DN.  - Random UPC.  - follow up BMP.      Davin Pryor MD  Nephrology

## 2024-01-28 NOTE — RAPID RESPONSE TEAM SUMMARY - NSSITUATIONBACKGROUNDRRT_GEN_ALL_CORE
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     Patient is English speaking but declined  services and preferred daughter to translate. As per daughter, patient has been having nonproductive cough since yesterday. Was not offered any antitussives at rehab. Tmax 101 yesterday which resolved with Tylenol. Today, he was persistently coughing and daughter requested patient to be sent to the ER for further evaluation.    Recent admission from 1/20/24 - 1/23/24 for RLE DVT. Started on apixaban. Course complicated by SUDHIR on CKD stage IIIa. CT A/P without evidence of hydronephrosis. Received IVF with improvement of renal function. Also with transient acute metabolic encephalopathy as per son-in-law. NCHCT without acute intracranial findings. Found to have vitamin B12 deficiency so supplementation was initiated. Discharged to [facility].    In the ED, VSS except Tmax 102.6 and RR as elevated as 26. No documented hypoxia but placed on 4L NC for symptomatic relief? WBC 23.09K, hgb 10.0, bicarb 20, BUN 30, Cr 2.09. Pro-BNP 9986. VBG 7.44 / 36 / 48 / 24. COVID/influenza negative. NCHCT without acute intracranial findings. CTA chest limited for peripheral emboli, no central pulmonary embolus and with small bilateral pleural effusions and mild interstitial edema and bibasilar dependent atelectatic changes. Received 1L LR bolus, cefepime 2 g and vancomycin 1 g IV. 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     Patient is Turkmen speaking but declined  services and preferred daughter to translate. As per daughter, patient has been having nonproductive cough since yesterday. Was not offered any antitussives at rehab. Tmax 101 yesterday which resolved with Tylenol. Today, he was persistently coughing and daughter requested patient to be sent to the ER for further evaluation.    Recent admission from 1/20/24 - 1/23/24 for RLE DVT. Started on apixaban. Course complicated by SUDHIR on CKD stage IIIa. CT A/P without evidence of hydronephrosis. Received IVF with improvement of renal function. Also with transient acute metabolic encephalopathy as per son-in-law. NCHCT without acute intracranial findings. Found to have vitamin B12 deficiency so supplementation was initiated. Discharged to [facility].    In the ED, VSS except Tmax 102.6 and RR as elevated as 26. No documented hypoxia but placed on 4L NC for symptomatic relief? WBC 23.09K, hgb 10.0, bicarb 20, BUN 30, Cr 2.09. Pro-BNP 9986. VBG 7.44 / 36 / 48 / 24. COVID/influenza negative. NCHCT without acute intracranial findings. CTA chest limited for peripheral emboli, no central pulmonary embolus and with small bilateral pleural effusions and mild interstitial edema and bibasilar dependent atelectatic changes. Received 1L LR bolus, cefepime 2 g and vancomycin 1 g IV. (26 Jan 2024 19:52)

## 2024-01-28 NOTE — PROVIDER CONTACT NOTE (EICU) - RECOMMENDATIONS
vasopressor for septic shock  sedation for vent synchrony  abx for pna  aggressive suctioning. send sputum culture  monitor UO and creatinine  needs palliative care

## 2024-01-28 NOTE — CONSULT NOTE ADULT - SUBJECTIVE AND OBJECTIVE BOX
Patient is a 83y old  Male who presents with a chief complaint of suspected HCAP (27 Jan 2024 11:17)    BRIEF HOSPITAL COURSE: ***    Events last 24 hours: ***    PAST MEDICAL & SURGICAL HISTORY:  Hyperlipidemia  HTN (hypertension)  CAD S/P percutaneous coronary angioplasty  Diabetes mellitus type II, non insulin dependent  Stage 3 chronic kidney disease  Right leg DVT  H/O colonoscopy  7/12  OthEr postprocedural status  S/P hemorrhoidectomy    Allergies  No Known Allergies    Intolerances    FAMILY HISTORY:  Family history of diabetes mellitus (Father, Mother, Sibling, Child, Grandparent, Aunt)  Family history of diabetes mellitus  Family history of cardiovascular disease (Father, Mother, Sibling, Child, Grandparent, Aunt)  Family history of hypertension    SOCIAL HISTORY:   Unable to obtain in the setting of altered mental status/respiratory distress.    Review of Systems:  Unable to obtain. See above.    Medications:  cefepime   IVPB 2000 milliGRAM(s) IV Intermittent every 24 hours  vancomycin  IVPB 750 milliGRAM(s) IV Intermittent every 24 hours  amLODIPine   Tablet 10 milliGRAM(s) Oral daily  metoprolol succinate ER 50 milliGRAM(s) Oral daily  norepinephrine Infusion 0.05 MICROgram(s)/kG/Min IV Continuous <Continuous>  albuterol/ipratropium for Nebulization 3 milliLiter(s) Nebulizer once  albuterol/ipratropium for Nebulization 3 milliLiter(s) Nebulizer every 6 hours  benzonatate 100 milliGRAM(s) Oral three times a day  acetaminophen     Tablet .. 650 milliGRAM(s) Oral every 6 hours PRN  acetaminophen   IVPB .. 1000 milliGRAM(s) IV Intermittent once  etomidate Injectable 20 milliGRAM(s) IV Push once  melatonin 3 milliGRAM(s) Oral at bedtime PRN  ondansetron Injectable 4 milliGRAM(s) IV Push every 8 hours PRN  propofol Infusion 10 MICROgram(s)/kG/Min IV Continuous <Continuous>  apixaban 5 milliGRAM(s) Oral every 12 hours  aspirin enteric coated 81 milliGRAM(s) Oral daily  ticagrelor 90 milliGRAM(s) Oral every 12 hours  aluminum hydroxide/magnesium hydroxide/simethicone Suspension 30 milliLiter(s) Oral every 4 hours PRN  pantoprazole  Injectable 40 milliGRAM(s) IV Push daily  atorvastatin 40 milliGRAM(s) Oral at bedtime  dextrose 50% Injectable 25 Gram(s) IV Push once  dextrose 50% Injectable 25 Gram(s) IV Push once  dextrose 50% Injectable 12.5 Gram(s) IV Push once  dextrose Oral Gel 15 Gram(s) Oral once PRN  glucagon  Injectable 1 milliGRAM(s) IntraMuscular once  insulin lispro (ADMELOG) corrective regimen sliding scale   SubCutaneous every 6 hours  cyanocobalamin 1000 MICROGram(s) Oral daily  dextrose 5%. 1000 milliLiter(s) IV Continuous <Continuous>  dextrose 5%. 1000 milliLiter(s) IV Continuous <Continuous>  folic acid 1 milliGRAM(s) Oral daily  influenza  Vaccine (HIGH DOSE) 0.7 milliLiter(s) IntraMuscular once  chlorhexidine 0.12% Liquid 15 milliLiter(s) Oral Mucosa every 12 hours  chlorhexidine 2% Cloths 1 Application(s) Topical <User Schedule>    ICU Vital Signs Last 24 Hrs  T(C): 38.9 (28 Jan 2024 00:57), Max: 38.9 (28 Jan 2024 00:57)  T(F): 102.1 (28 Jan 2024 00:57), Max: 102.1 (28 Jan 2024 00:57)  HR: 90 (28 Jan 2024 00:30) (65 - 92)  BP: 110/65 (28 Jan 2024 00:30) (103/66 - 115/70)  BP(mean): --  ABP: --  ABP(mean): --  RR: 32 (28 Jan 2024 00:30) (20 - 32)  SpO2: 93% (28 Jan 2024 00:30) (93% - 98%)    O2 Parameters below as of 28 Jan 2024 00:30  Patient On (Oxygen Delivery Method): nasal cannula  O2 Flow (L/min): 3    Vital Signs Last 24 Hrs  T(C): 38.9 (28 Jan 2024 00:57), Max: 38.9 (28 Jan 2024 00:57)  T(F): 102.1 (28 Jan 2024 00:57), Max: 102.1 (28 Jan 2024 00:57)  HR: 90 (28 Jan 2024 00:30) (65 - 92)  BP: 110/65 (28 Jan 2024 00:30) (103/66 - 115/70)  BP(mean): --  RR: 32 (28 Jan 2024 00:30) (20 - 32)  SpO2: 93% (28 Jan 2024 00:30) (93% - 98%)    Parameters below as of 28 Jan 2024 00:30  Patient On (Oxygen Delivery Method): nasal cannula  O2 Flow (L/min): 3    ABG - ( 28 Jan 2024 00:50 )  pH, Arterial: 7.31  pH, Blood: x     /  pCO2: 35    /  pO2: 61    / HCO3: 18    / Base Excess: -7.8  /  SaO2: 87.3      I&O's Detail    26 Jan 2024 07:01  -  27 Jan 2024 07:00  --------------------------------------------------------  IN:  Total IN: 0 mL    OUT:    Voided (mL): 400 mL  Total OUT: 400 mL    Total NET: -400 mL    LABS:                        10.3   24.30 )-----------( 271      ( 28 Jan 2024 00:39 )             31.4     01-27    139  |  110<H>  |  30<H>  ----------------------------<  119<H>  3.3<L>   |  22  |  2.30<H>    Ca    7.8<L>      27 Jan 2024 05:23    TPro  6.5  /  Alb  2.3<L>  /  TBili  0.6  /  DBili  x   /  AST  31  /  ALT  21  /  AlkPhos  104  01-26    CAPILLARY BLOOD GLUCOSE    POCT Blood Glucose.: 209 mg/dL (28 Jan 2024 00:37)    PT/INR - ( 26 Jan 2024 13:44 )   PT: 21.4 sec;   INR: 1.83 ratio    PTT - ( 26 Jan 2024 13:44 )  PTT:32.9 sec    Urinalysis Basic - ( 27 Jan 2024 05:23 )  Color: x / Appearance: x / SG: x / pH: x  Gluc: 119 mg/dL / Ketone: x  / Bili: x / Urobili: x   Blood: x / Protein: x / Nitrite: x   Leuk Esterase: x / RBC: x / WBC x   Sq Epi: x / Non Sq Epi: x / Bacteria: x    CULTURES:  Culture Results:   No growth at 24 hours (01-26 @ 13:44)  Culture Results:   No growth at 24 hours (01-26 @ 13:44)    Physical Examination:    General: Well appearing, lying in bed in NAD.    HEENT: Pupils equal, reactive to light. Symmetric. No scleral icterus or injection.    PULM: Clear to auscultation B/L. No wheezes, rales, or rhonchi appreciated. No significant sputum production or increased respiratory effort.    NECK: Supple, no lymphadenopathy, trachea midline.    CVS: Regular rate and rhythm, no murmurs appreciated, +s1/s2.    ABD: Soft, nondistended, nontender, normoactive bowel sounds.    EXT: No edema, nontender.    SKIN: Warm and well perfused, no rashes noted.    NEURO: Alert, oriented, interactive, nonfocal.    RADIOLOGY: ***    CRITICAL CARE TIME SPENT: 60 minutes    Date of entry of this note is equal to the date of services rendered.   Patient is a 83y old  Male who presents with a chief complaint of suspected HCAP (27 Jan 2024 11:17)    BRIEF HOSPITAL COURSE: 83 year old M with PMHx of HTN, HLD, CAD s/p JOSH x2 in 11/2018,     Events last 24 hours: ***    PAST MEDICAL & SURGICAL HISTORY:  Hyperlipidemia  HTN (hypertension)  CAD S/P percutaneous coronary angioplasty  Diabetes mellitus type II, non insulin dependent  Stage 3 chronic kidney disease  Right leg DVT  H/O colonoscopy  7/12  OthEr postprocedural status  S/P hemorrhoidectomy    Allergies  No Known Allergies    Intolerances    FAMILY HISTORY:  Family history of diabetes mellitus (Father, Mother, Sibling, Child, Grandparent, Aunt)  Family history of diabetes mellitus  Family history of cardiovascular disease (Father, Mother, Sibling, Child, Grandparent, Aunt)  Family history of hypertension    SOCIAL HISTORY:   Unable to obtain in the setting of altered mental status/respiratory distress.    Review of Systems:  Unable to obtain. See above.    Medications:  cefepime   IVPB 2000 milliGRAM(s) IV Intermittent every 24 hours  vancomycin  IVPB 750 milliGRAM(s) IV Intermittent every 24 hours  amLODIPine   Tablet 10 milliGRAM(s) Oral daily  metoprolol succinate ER 50 milliGRAM(s) Oral daily  norepinephrine Infusion 0.05 MICROgram(s)/kG/Min IV Continuous <Continuous>  albuterol/ipratropium for Nebulization 3 milliLiter(s) Nebulizer once  albuterol/ipratropium for Nebulization 3 milliLiter(s) Nebulizer every 6 hours  benzonatate 100 milliGRAM(s) Oral three times a day  acetaminophen     Tablet .. 650 milliGRAM(s) Oral every 6 hours PRN  acetaminophen   IVPB .. 1000 milliGRAM(s) IV Intermittent once  etomidate Injectable 20 milliGRAM(s) IV Push once  melatonin 3 milliGRAM(s) Oral at bedtime PRN  ondansetron Injectable 4 milliGRAM(s) IV Push every 8 hours PRN  propofol Infusion 10 MICROgram(s)/kG/Min IV Continuous <Continuous>  apixaban 5 milliGRAM(s) Oral every 12 hours  aspirin enteric coated 81 milliGRAM(s) Oral daily  ticagrelor 90 milliGRAM(s) Oral every 12 hours  aluminum hydroxide/magnesium hydroxide/simethicone Suspension 30 milliLiter(s) Oral every 4 hours PRN  pantoprazole  Injectable 40 milliGRAM(s) IV Push daily  atorvastatin 40 milliGRAM(s) Oral at bedtime  dextrose 50% Injectable 25 Gram(s) IV Push once  dextrose 50% Injectable 25 Gram(s) IV Push once  dextrose 50% Injectable 12.5 Gram(s) IV Push once  dextrose Oral Gel 15 Gram(s) Oral once PRN  glucagon  Injectable 1 milliGRAM(s) IntraMuscular once  insulin lispro (ADMELOG) corrective regimen sliding scale   SubCutaneous every 6 hours  cyanocobalamin 1000 MICROGram(s) Oral daily  dextrose 5%. 1000 milliLiter(s) IV Continuous <Continuous>  dextrose 5%. 1000 milliLiter(s) IV Continuous <Continuous>  folic acid 1 milliGRAM(s) Oral daily  influenza  Vaccine (HIGH DOSE) 0.7 milliLiter(s) IntraMuscular once  chlorhexidine 0.12% Liquid 15 milliLiter(s) Oral Mucosa every 12 hours  chlorhexidine 2% Cloths 1 Application(s) Topical <User Schedule>    ICU Vital Signs Last 24 Hrs  T(C): 38.9 (28 Jan 2024 00:57), Max: 38.9 (28 Jan 2024 00:57)  T(F): 102.1 (28 Jan 2024 00:57), Max: 102.1 (28 Jan 2024 00:57)  HR: 90 (28 Jan 2024 00:30) (65 - 92)  BP: 110/65 (28 Jan 2024 00:30) (103/66 - 115/70)  BP(mean): --  ABP: --  ABP(mean): --  RR: 32 (28 Jan 2024 00:30) (20 - 32)  SpO2: 93% (28 Jan 2024 00:30) (93% - 98%)    O2 Parameters below as of 28 Jan 2024 00:30  Patient On (Oxygen Delivery Method): nasal cannula  O2 Flow (L/min): 3    Vital Signs Last 24 Hrs  T(C): 38.9 (28 Jan 2024 00:57), Max: 38.9 (28 Jan 2024 00:57)  T(F): 102.1 (28 Jan 2024 00:57), Max: 102.1 (28 Jan 2024 00:57)  HR: 90 (28 Jan 2024 00:30) (65 - 92)  BP: 110/65 (28 Jan 2024 00:30) (103/66 - 115/70)  BP(mean): --  RR: 32 (28 Jan 2024 00:30) (20 - 32)  SpO2: 93% (28 Jan 2024 00:30) (93% - 98%)    Parameters below as of 28 Jan 2024 00:30  Patient On (Oxygen Delivery Method): nasal cannula  O2 Flow (L/min): 3    ABG - ( 28 Jan 2024 00:50 )  pH, Arterial: 7.31  pH, Blood: x     /  pCO2: 35    /  pO2: 61    / HCO3: 18    / Base Excess: -7.8  /  SaO2: 87.3      I&O's Detail    26 Jan 2024 07:01  -  27 Jan 2024 07:00  --------------------------------------------------------  IN:  Total IN: 0 mL    OUT:    Voided (mL): 400 mL  Total OUT: 400 mL    Total NET: -400 mL    LABS:                        10.3   24.30 )-----------( 271      ( 28 Jan 2024 00:39 )             31.4     01-27    139  |  110<H>  |  30<H>  ----------------------------<  119<H>  3.3<L>   |  22  |  2.30<H>    Ca    7.8<L>      27 Jan 2024 05:23    TPro  6.5  /  Alb  2.3<L>  /  TBili  0.6  /  DBili  x   /  AST  31  /  ALT  21  /  AlkPhos  104  01-26    CAPILLARY BLOOD GLUCOSE    POCT Blood Glucose.: 209 mg/dL (28 Jan 2024 00:37)    PT/INR - ( 26 Jan 2024 13:44 )   PT: 21.4 sec;   INR: 1.83 ratio    PTT - ( 26 Jan 2024 13:44 )  PTT:32.9 sec    Urinalysis Basic - ( 27 Jan 2024 05:23 )  Color: x / Appearance: x / SG: x / pH: x  Gluc: 119 mg/dL / Ketone: x  / Bili: x / Urobili: x   Blood: x / Protein: x / Nitrite: x   Leuk Esterase: x / RBC: x / WBC x   Sq Epi: x / Non Sq Epi: x / Bacteria: x    CULTURES:  Culture Results:   No growth at 24 hours (01-26 @ 13:44)  Culture Results:   No growth at 24 hours (01-26 @ 13:44)    Physical Examination:    General: Well appearing, lying in bed in NAD.    HEENT: Pupils equal, reactive to light. Symmetric. No scleral icterus or injection.    PULM: Clear to auscultation B/L. No wheezes, rales, or rhonchi appreciated. No significant sputum production or increased respiratory effort.    NECK: Supple, no lymphadenopathy, trachea midline.    CVS: Regular rate and rhythm, no murmurs appreciated, +s1/s2.    ABD: Soft, nondistended, nontender, normoactive bowel sounds.    EXT: No edema, nontender.    SKIN: Warm and well perfused, no rashes noted.    NEURO: Alert, oriented, interactive, nonfocal.    RADIOLOGY: ***    CRITICAL CARE TIME SPENT: 60 minutes    Date of entry of this note is equal to the date of services rendered.   Patient is a 83y old  Male who presents with a chief complaint of suspected HCAP (27 Jan 2024 11:17)    BRIEF HOSPITAL COURSE: 83 year old M with PMHx of HTN, HLD, CAD s/p JOSH x2 in 11/2018, DM2, CKD stage III, and recently diagnosed RLE DVT on eliquis who presented to ER on 1/26 with cough and fever. Pt was admitted to telemetry with suspected HCAP and started on abx.    Of note, pt was admitted to [facility] from 1/20 to 1/23 during which time he was diagnosed with RLE DVT and started on anticoagulation.    Events last 24 hours: Rapid response was called for unresponsiveness and tachypnea. Found to be febrile (102 degrees). All other vital signs were stable. ABG obtained which revealed hypoxemia. STAT CT head ordered, and ICU was consulted.    Pt seen and examined at the bedside. Minimally responsive to deep noxious stimuli. Gurgling on oral secretions despite aggressive suctioning. Decision was made to intubate pt for airway protection.    PAST MEDICAL & SURGICAL HISTORY:  Hyperlipidemia  HTN (hypertension)  CAD S/P percutaneous coronary angioplasty  Diabetes mellitus type II, non insulin dependent  Stage 3 chronic kidney disease  Right leg DVT  H/O colonoscopy  7/12  OthEr postprocedural status  S/P hemorrhoidectomy    Allergies  No Known Allergies    Intolerances    FAMILY HISTORY:  Family history of diabetes mellitus (Father, Mother, Sibling, Child, Grandparent, Aunt)  Family history of diabetes mellitus  Family history of cardiovascular disease (Father, Mother, Sibling, Child, Grandparent, Aunt)  Family history of hypertension    SOCIAL HISTORY:   Unable to obtain in the setting of altered mental status/respiratory distress.    Review of Systems:  Unable to obtain. See above.    Medications:  cefepime   IVPB 2000 milliGRAM(s) IV Intermittent every 24 hours  vancomycin  IVPB 750 milliGRAM(s) IV Intermittent every 24 hours  amLODIPine   Tablet 10 milliGRAM(s) Oral daily  metoprolol succinate ER 50 milliGRAM(s) Oral daily  norepinephrine Infusion 0.05 MICROgram(s)/kG/Min IV Continuous <Continuous>  albuterol/ipratropium for Nebulization 3 milliLiter(s) Nebulizer once  albuterol/ipratropium for Nebulization 3 milliLiter(s) Nebulizer every 6 hours  benzonatate 100 milliGRAM(s) Oral three times a day  acetaminophen     Tablet .. 650 milliGRAM(s) Oral every 6 hours PRN  acetaminophen   IVPB .. 1000 milliGRAM(s) IV Intermittent once  etomidate Injectable 20 milliGRAM(s) IV Push once  melatonin 3 milliGRAM(s) Oral at bedtime PRN  ondansetron Injectable 4 milliGRAM(s) IV Push every 8 hours PRN  propofol Infusion 10 MICROgram(s)/kG/Min IV Continuous <Continuous>  apixaban 5 milliGRAM(s) Oral every 12 hours  aspirin enteric coated 81 milliGRAM(s) Oral daily  ticagrelor 90 milliGRAM(s) Oral every 12 hours  aluminum hydroxide/magnesium hydroxide/simethicone Suspension 30 milliLiter(s) Oral every 4 hours PRN  pantoprazole  Injectable 40 milliGRAM(s) IV Push daily  atorvastatin 40 milliGRAM(s) Oral at bedtime  dextrose 50% Injectable 25 Gram(s) IV Push once  dextrose 50% Injectable 25 Gram(s) IV Push once  dextrose 50% Injectable 12.5 Gram(s) IV Push once  dextrose Oral Gel 15 Gram(s) Oral once PRN  glucagon  Injectable 1 milliGRAM(s) IntraMuscular once  insulin lispro (ADMELOG) corrective regimen sliding scale   SubCutaneous every 6 hours  cyanocobalamin 1000 MICROGram(s) Oral daily  dextrose 5%. 1000 milliLiter(s) IV Continuous <Continuous>  dextrose 5%. 1000 milliLiter(s) IV Continuous <Continuous>  folic acid 1 milliGRAM(s) Oral daily  influenza  Vaccine (HIGH DOSE) 0.7 milliLiter(s) IntraMuscular once  chlorhexidine 0.12% Liquid 15 milliLiter(s) Oral Mucosa every 12 hours  chlorhexidine 2% Cloths 1 Application(s) Topical <User Schedule>    ICU Vital Signs Last 24 Hrs  T(C): 38.9 (28 Jan 2024 00:57), Max: 38.9 (28 Jan 2024 00:57)  T(F): 102.1 (28 Jan 2024 00:57), Max: 102.1 (28 Jan 2024 00:57)  HR: 90 (28 Jan 2024 00:30) (65 - 92)  BP: 110/65 (28 Jan 2024 00:30) (103/66 - 115/70)  BP(mean): --  ABP: --  ABP(mean): --  RR: 32 (28 Jan 2024 00:30) (20 - 32)  SpO2: 93% (28 Jan 2024 00:30) (93% - 98%)    O2 Parameters below as of 28 Jan 2024 00:30  Patient On (Oxygen Delivery Method): nasal cannula  O2 Flow (L/min): 3    Vital Signs Last 24 Hrs  T(C): 38.9 (28 Jan 2024 00:57), Max: 38.9 (28 Jan 2024 00:57)  T(F): 102.1 (28 Jan 2024 00:57), Max: 102.1 (28 Jan 2024 00:57)  HR: 90 (28 Jan 2024 00:30) (65 - 92)  BP: 110/65 (28 Jan 2024 00:30) (103/66 - 115/70)  BP(mean): --  RR: 32 (28 Jan 2024 00:30) (20 - 32)  SpO2: 93% (28 Jan 2024 00:30) (93% - 98%)    Parameters below as of 28 Jan 2024 00:30  Patient On (Oxygen Delivery Method): nasal cannula  O2 Flow (L/min): 3    ABG - ( 28 Jan 2024 00:50 )  pH, Arterial: 7.31  pH, Blood: x     /  pCO2: 35    /  pO2: 61    / HCO3: 18    / Base Excess: -7.8  /  SaO2: 87.3      I&O's Detail    26 Jan 2024 07:01  -  27 Jan 2024 07:00  --------------------------------------------------------  IN:  Total IN: 0 mL    OUT:    Voided (mL): 400 mL  Total OUT: 400 mL    Total NET: -400 mL    LABS:                        10.3   24.30 )-----------( 271      ( 28 Jan 2024 00:39 )             31.4     01-27    139  |  110<H>  |  30<H>  ----------------------------<  119<H>  3.3<L>   |  22  |  2.30<H>    Ca    7.8<L>      27 Jan 2024 05:23    TPro  6.5  /  Alb  2.3<L>  /  TBili  0.6  /  DBili  x   /  AST  31  /  ALT  21  /  AlkPhos  104  01-26    CAPILLARY BLOOD GLUCOSE    POCT Blood Glucose.: 209 mg/dL (28 Jan 2024 00:37)    PT/INR - ( 26 Jan 2024 13:44 )   PT: 21.4 sec;   INR: 1.83 ratio    PTT - ( 26 Jan 2024 13:44 )  PTT:32.9 sec    Urinalysis Basic - ( 27 Jan 2024 05:23 )  Color: x / Appearance: x / SG: x / pH: x  Gluc: 119 mg/dL / Ketone: x  / Bili: x / Urobili: x   Blood: x / Protein: x / Nitrite: x   Leuk Esterase: x / RBC: x / WBC x   Sq Epi: x / Non Sq Epi: x / Bacteria: x    CULTURES:  Culture Results:   No growth at 24 hours (01-26 @ 13:44)  Culture Results:   No growth at 24 hours (01-26 @ 13:44)    Physical Examination:    General: Tachypneic, accessory muscle use, audibly gurgling on secretions.    HEENT: Pupils equal, reactive to light. Symmetric. No scleral icterus or injection.    PULM: Diminished breath sounds b/l.    NECK: Supple, no lymphadenopathy, trachea midline.    CVS: Regular rate and rhythm, no murmurs appreciated, +s1/s2.    ABD: Soft, nondistended, nontender, normoactive bowel sounds.    EXT: No edema, nontender.    SKIN: Warm and well perfused, no rashes noted.    NEURO: Minimally responsive to deep noxious stimuli.    RADIOLOGY: ***    CRITICAL CARE TIME SPENT: 60 minutes    Date of entry of this note is equal to the date of services rendered.

## 2024-01-28 NOTE — PROGRESS NOTE ADULT - SUBJECTIVE AND OBJECTIVE BOX
MD DAHIANA  83y  Patient is a 83y old  Male who presents with a chief complaint of HCAP, SUDHIR on CKD (28 Jan 2024 01:36)    HPI:  Seen and examined. Followed for SUDHIR on CKD. Overnight events noted, now in shock and intubated.    HEALTH ISSUES - PROBLEM Dx:  Sepsis due to pneumonia    Hospital acquired PNA    Acute kidney injury superimposed on CKD    Multinodular thyroid    HTN (hypertension)    HLD (hyperlipidemia)    CAD S/P percutaneous coronary angioplasty    Diabetes mellitus type II, non insulin dependent    Right leg DVT    Macrocytic anemia          MEDICATIONS  (STANDING):  albuterol/ipratropium for Nebulization 3 milliLiter(s) Nebulizer every 6 hours  amLODIPine   Tablet 10 milliGRAM(s) Oral daily  apixaban 5 milliGRAM(s) Oral every 12 hours  aspirin enteric coated 81 milliGRAM(s) Oral daily  atorvastatin 40 milliGRAM(s) Oral at bedtime  benzonatate 100 milliGRAM(s) Oral three times a day  cefepime   IVPB 2000 milliGRAM(s) IV Intermittent every 24 hours  chlorhexidine 0.12% Liquid 15 milliLiter(s) Oral Mucosa every 12 hours  chlorhexidine 2% Cloths 1 Application(s) Topical <User Schedule>  cyanocobalamin 1000 MICROGram(s) Oral daily  dextrose 5%. 1000 milliLiter(s) (50 mL/Hr) IV Continuous <Continuous>  dextrose 5%. 1000 milliLiter(s) (100 mL/Hr) IV Continuous <Continuous>  dextrose 50% Injectable 25 Gram(s) IV Push once  dextrose 50% Injectable 12.5 Gram(s) IV Push once  dextrose 50% Injectable 25 Gram(s) IV Push once  fentaNYL   Infusion. 0.5 MICROgram(s)/kG/Hr (3.63 mL/Hr) IV Continuous <Continuous>  folic acid 1 milliGRAM(s) Oral daily  glucagon  Injectable 1 milliGRAM(s) IntraMuscular once  influenza  Vaccine (HIGH DOSE) 0.7 milliLiter(s) IntraMuscular once  insulin lispro (ADMELOG) corrective regimen sliding scale   SubCutaneous every 6 hours  metoprolol succinate ER 50 milliGRAM(s) Oral daily  norepinephrine Infusion 0.05 MICROgram(s)/kG/Min (6.72 mL/Hr) IV Continuous <Continuous>  pantoprazole  Injectable 40 milliGRAM(s) IV Push daily  propofol Infusion 10 MICROgram(s)/kG/Min (4.3 mL/Hr) IV Continuous <Continuous>  sodium chloride 3%  Inhalation 4 milliLiter(s) Inhalation every 6 hours  ticagrelor 90 milliGRAM(s) Oral every 12 hours  vancomycin  IVPB 750 milliGRAM(s) IV Intermittent every 24 hours    MEDICATIONS  (PRN):  acetaminophen     Tablet .. 650 milliGRAM(s) Oral every 6 hours PRN Temp greater or equal to 38C (100.4F), Mild Pain (1 - 3)  aluminum hydroxide/magnesium hydroxide/simethicone Suspension 30 milliLiter(s) Oral every 4 hours PRN Dyspepsia  dextrose Oral Gel 15 Gram(s) Oral once PRN Blood Glucose LESS THAN 70 milliGRAM(s)/deciliter  fentaNYL    Injectable 50 MICROGram(s) IV Push every 2 hours PRN Ventilator asynchrony/agitation  melatonin 3 milliGRAM(s) Oral at bedtime PRN Insomnia  ondansetron Injectable 4 milliGRAM(s) IV Push every 8 hours PRN Nausea and/or Vomiting    Vital Signs Last 24 Hrs  T(C): 36.5 (28 Jan 2024 10:30), Max: 38.9 (28 Jan 2024 00:57)  T(F): 97.7 (28 Jan 2024 10:30), Max: 102.1 (28 Jan 2024 00:57)  HR: 65 (28 Jan 2024 10:30) (58 - 108)  BP: 115/58 (28 Jan 2024 09:00) (70/49 - 124/59)  BP(mean): 73 (28 Jan 2024 09:00) (45 - 78)  RR: 31 (28 Jan 2024 10:30) (12 - 34)  SpO2: 83% (28 Jan 2024 10:30) (79% - 98%)    Parameters below as of 28 Jan 2024 06:32  Patient On (Oxygen Delivery Method): ventilator      Daily     Daily     PHYSICAL EXAM:  Constitutional:  He appears comfortable and not distressed. Not diaphoretic.    Neck:  The thyroid is normal. Trachea is midline.     Breasts: Normal examination.    Respiratory: The lungs are clear to auscultation. No dullness and expansion is normal.    Cardiovascular: S1 and S2 are normal. No mummurs, rubs or gallops are present.    Gastrointestinal: The abdomen is soft. No tenderness is present.     Genitourinary: The bladder is not distended. No CVA tenderness is present.    Extremities: No edema is noted. No deformities are present.    Neurological: sedated.    Skin: No lesions are seen  or palpated.    Lymph Nodes: No lymphadenopathy is present.      I&O's Summary    27 Jan 2024 07:01  -  28 Jan 2024 07:00  --------------------------------------------------------  IN: 1247.9 mL / OUT: 420 mL / NET: 827.9 mL    28 Jan 2024 07:01  -  28 Jan 2024 11:51  --------------------------------------------------------  IN: 239.2 mL / OUT: 65 mL / NET: 174.2 mL                              9.5    36.39 )-----------( 278      ( 28 Jan 2024 09:50 )             27.9     01-28    142  |  111<H>  |  40<H>  ----------------------------<  221<H>  4.4   |  18<L>  |  2.77<H>    Ca    8.3<L>      28 Jan 2024 00:39  Phos  4.6     01-28  Mg     2.4     01-28    TPro  6.5  /  Alb  2.3<L>  /  TBili  0.6  /  DBili  x   /  AST  31  /  ALT  21  /  AlkPhos  104  01-26    Urinalysis Basic - ( 28 Jan 2024 00:39 )

## 2024-01-28 NOTE — DISCHARGE NOTE FOR THE EXPIRED PATIENT - HOSPITAL COURSE
83 year old M with PMHx of HTN, HLD, CAD s/p JOSH x2 in 2018, DM2, CKD stage III, and recently diagnosed RLE DVT on eliquis who presented to ER on  with cough and fever. Pt was admitted to telemetry with suspected HCAP and started on abx. Hospital course complicated by altered mental status and acute hypoxic respiratory failure requiring emergent intubation overnight. Pt was transferred to ICU. Underwent bronchoscopy x 3. Hospital course further complicated by dual vasopressor shock state, SUDHIR, metabolic/lactic acidosis, and transaminitis. Pt  at 22:00. A line tracing flat, asystole on the monitor, no palpable pulses, and pupils fixed and dilated.

## 2024-01-28 NOTE — PROCEDURE NOTE - NSBRONCHPROCDETAILS_GEN_A_CORE_FT
A flexible bronchoscope was inserted through ET tube. Alejandra visualized. Mucosa inflamed. Thick, bloody secretions noted throughout. Large mucus plug noted above right main stem bronchus. Multiple attempts were made to lavage and suction this out. SpO2 improved from high 70s to 89% post procedure.     Post procedure CXR revealed complete white out of left lung. Repeat bronchoscopy to be performed.

## 2024-01-28 NOTE — PROCEDURE NOTE - ADDITIONAL PROCEDURE DETAILS
Copious amount of bloody secretions were suctioned from airway. Grade 1 view of vocal cords. Was unable to pass 7.5mm ET tube through cords. Upon second visualization, able to pass 7.0mm ET tube. SpO2 remained > 88% throughout the entire event.    Dx: Altered mental status, acute hypoxic respiratory failure    Procedure performed independent of critical care time.

## 2024-01-28 NOTE — CHART NOTE - NSCHARTNOTEFT_GEN_A_CORE
Responded to RRT called at 12:36 AM for acute mental status change. Patient seen and examined at bedside. Patient is Telugu-speaking but currently AAO x 0, minimally responsive to sternal rub, actively gurgling, and does not appear to be protecting his airway. Compared to time of admission yesterday, this is a drastic change in mentation. Temp 102.1, HR 90, /65, SpO2 90-93% on 3L NC. STAT ABG, NCHCT, and ammonia ordered. Discussed with son-in-law ([phone]) who confirms code status is full code. States he is on his way to the hospital from [place]. Discussed with nursing supervisor who states it is okay for son-in-law to come in at this time. Discussed with ICU PA who will evaluate patient for acute hypoxic respiratory failure secondary to suspected HCAP. Plan for intubation and upgrade to ICU.     ABG - ( 28 Jan 2024 00:50 )  pH, Arterial: 7.31  pH, Blood: x     /  pCO2: 35    /  pO2: 61    / HCO3: 18    / Base Excess: -7.8  /  SaO2: 87.3

## 2024-01-28 NOTE — RAPID RESPONSE TEAM SUMMARY - NSOTHERINTERVENTIONSRRT_GEN_ALL_CORE
Acute [place] in mS   Unresponsive with stable vitals   R/O stroke     Ct brain urgent   Labs with blood cx and ammonia levels   ABG    DR Roberts at bedside for the RRT . Discussed case with the son and he will be coming in . As of now pt to be full code  Resp distress , ( intubated )   Sepsis  Tachypnea  Acute change in MS   Unresponsive  R/O stroke     Ct brain urgent   Labs with blood cx and ammonia levels   ABG    DR Roberts at bedside for the RRT . Discussed case with the son and he will be coming in . As of now pt to be full code

## 2024-01-28 NOTE — CONSULT NOTE ADULT - CRITICAL CARE ATTENDING COMMENT
Agree with above  83M PMH CKD Stage III, CAD, recent RLE DVT p/w cough and fever. Worsening respiratory failure requiring intubation for Type 1 respiratory failure and altered mental status. Also with worsening renal failure  - Patient now intubated on 22/500/100%/+12, plateau 34, decreased TV to 450. Will re-check ABG  - Intermittent episodes of hypoxemia with significant drops in spO2. Patiient underwent bronchoscopy by me, which shows R side completely clear, but significant secretions on L at level of MARTINA and LLL. Saline infused in attempt to loosen secretions. Some secretions were removed, but patient could not tolerate rest of procedure due to hypoxemia. Will c/w NEBs + hyperSAL + pulmozyme. Compared with earlier bronchoscopy, there was minimal bleeding and no clots seen.  - Patient was sedated and paralyzed for ventilator synchrony, as posterior membrane showed significant upward excursion during the bronchoscopy, causing a dynamic tracheal obstruction.  - ABG 7.19/31/60/14 BE -14 c/w severe hypoxemia (p:F 60) and metabolic acidosis. Lactate 3.7. Will give some bicarb administration in the setting of SUDHIR/CKD in attempt to improve metabolic acidosis.  - Given patient's age and comorbidities, his prognosis is very poor. GOC discussions ongoing, decision was made to make patient DNR, but continue all current therapies.

## 2024-01-28 NOTE — PROVIDER CONTACT NOTE (EICU) - BACKGROUND
83 year old M with PMHx of HTN, HLD, CAD s/p JOSH x2 in 11/2018, DM2, CKD stage III, and recently diagnosed RLE DVT on eliquis who presented to ER on 1/26 with cough and fever. RRT called for respiratory failure.

## 2024-01-28 NOTE — CONSULT NOTE ADULT - ASSESSMENT
83 year old M with PMHx of HTN, HLD, CAD s/p JOSH x2 in 11/2018, DM2, CKD stage III, and recently diagnosed RLE DVT admitted with:    Acute hypoxic respiratory failure  Altered mental status  Aspiration pneumonitis, suspected  Lobar PNA, hospital acquired?  SUDHIR on CKD  Lactic acidosis    PLAN:  - Actively titrating deshaun    - Change diet to NPO.  - Add GI prophylaxis with protonix.   83 year old M with PMHx of HTN, HLD, CAD s/p JOSH x2 in 11/2018, DM2, CKD stage III, and recently diagnosed RLE DVT admitted with:    Acute hypoxic respiratory failure  Altered mental status  Distributive shock  Aspiration pneumonitis, suspected  Lobar PNA, hospital acquired?  SUDHIR on CKD  Lactic acidosis    PLAN:  - CT head negative for acute intracranial event.  - Actively titrating ventilator settings to maintain SpO2 > 92% and adequate minute ventilation.  - Obtain post intubation ABG.  - Follow up CT chest results.  - Propofol infusion initiated to maintain ventilator synchrony and promote patient comfort.  - Actively titrating levophed to maintain MAP > 65. Suspect that hypotension is sedation related.  - Change diet to NPO.  - Add GI prophylaxis with protonix.  - Abx coverage with cefepime and zosyn.  - Send sputum culture if able.  - Blood cultures without growth. Repeat blood cultures were sent during rapid response.  - Monitor renal function.  - Trend lactate until clearance.  - Change Accu-Cheks to q6 hrs while NPO.  - Bloody secretions noted in oropharynx during intubation and are being endotracheally suctioned. Hgb and plt count stable. Will monitor closely as he is on full dose anticoagulation for RLE DVT.    Pt's daughter, son-in-law, and wife present at the bedside. Updated/all questions answered.    Case discussed with eICU attending, Dr. Garay.

## 2024-01-28 NOTE — RAPID RESPONSE TEAM SUMMARY - NSADDTLFINDINGSRRT_GEN_ALL_CORE
See Rapid response sheet for full vital     Upon arrival pt unresponsive breathing heavy with Stable vitals and BS . Pt minimally response to sternal rub   PHYSICAL EXAM:  GENERAL: unresponsive   HEAD:  Normocephalic  EYES:  PERRLA  ENT: Moist mucous membranes  NECK: Supple, No JVD  CHEST/LUNG: + rhonchi   HEART: Regular rate and rhythm  ABDOMEN: Bowel sounds present; Soft, Nontender, Nondistended.  NERVOUS SYSTEM: unresponsive    See Rapid response sheet for full vital     Upon arrival pt unresponsive, breathing heavy , tachypneac, febrile 102 rectal , with stable BS . Pt minimally response to sternal rub   PHYSICAL EXAM:  GENERAL: unresponsive   HEAD:  Normocephalic  EYES:  PERRLA  ENT: Moist mucous membranes  NECK: Supple, No JVD  CHEST/LUNG: + rhonchi   HEART: Regular rate and rhythm  ABDOMEN: Bowel sounds present; Soft, Nontender, Nondistended.  NERVOUS SYSTEM: unresponsive

## 2024-01-28 NOTE — GOALS OF CARE CONVERSATION - ADVANCED CARE PLANNING - CONVERSATION DETAILS
Decision making has been deferred to pt's son-in-law based on prior goals of care documentation by ICU NP, Shi.     I spoke to pt's son-in-law regarding pt's current condition. Pt is in multisystem organ failure and is requiring two vasopressors (levophed 2.6mcg, fixed dose vasopressin). Despite aggressive interventions, pt's condition continues to worsen. At this time, son-in-law has agreed that we should not escalate care any further as this will only prolong pt's suffering/the natural dying process. We will continue all current interventions, but not increase vasopressor dosage, add additional therapies, or draw labs. We also spoke about the possibility of transitioning to full comfort measures. Pt's son-in-law would like to reassess in the morning with the understanding that even despite the interventions he is currently receiving, he may still pass away.
Discussed with patients daughter and son-in law at bedside the patients current medical condition, diagnosis and prognosis. We discussed that the patient is critically ill, and very unstable. He has had sevaral episodes of low BP and Oxygen that has required intervention.   We discussed that if his heart should stop that Chest compressions may not be successful in reviving patient.   The wife and daughter deferred to the son-in -law who wants to continue medical management but in the event the patient heart should stop , Not to do chest compressions.

## 2024-01-28 NOTE — PROCEDURE NOTE - NSINDICATIONS_GEN_A_CORE
Venous access
Venous access
airway protection/mental status change
critical illness/emergency venous access

## 2024-01-29 LAB
CULTURE RESULTS: SIGNIFICANT CHANGE UP
GRAM STN FLD: SIGNIFICANT CHANGE UP
SPECIMEN SOURCE: SIGNIFICANT CHANGE UP

## 2024-01-31 LAB
CULTURE RESULTS: SIGNIFICANT CHANGE UP
CULTURE RESULTS: SIGNIFICANT CHANGE UP
M PNEUMO IGM SER-ACNC: 0.4 INDEX — SIGNIFICANT CHANGE UP (ref 0–0.9)
MYCOPLASMA AG SPEC QL: NEGATIVE — SIGNIFICANT CHANGE UP
SPECIMEN SOURCE: SIGNIFICANT CHANGE UP
SPECIMEN SOURCE: SIGNIFICANT CHANGE UP

## 2024-02-01 DIAGNOSIS — E87.20 ACIDOSIS, UNSPECIFIED: ICD-10-CM

## 2024-02-01 DIAGNOSIS — J96.01 ACUTE RESPIRATORY FAILURE WITH HYPOXIA: ICD-10-CM

## 2024-02-01 DIAGNOSIS — J90 PLEURAL EFFUSION, NOT ELSEWHERE CLASSIFIED: ICD-10-CM

## 2024-02-01 DIAGNOSIS — Z79.02 LONG TERM (CURRENT) USE OF ANTITHROMBOTICS/ANTIPLATELETS: ICD-10-CM

## 2024-02-01 DIAGNOSIS — J18.9 PNEUMONIA, UNSPECIFIED ORGANISM: ICD-10-CM

## 2024-02-01 DIAGNOSIS — Z79.82 LONG TERM (CURRENT) USE OF ASPIRIN: ICD-10-CM

## 2024-02-01 DIAGNOSIS — G93.41 METABOLIC ENCEPHALOPATHY: ICD-10-CM

## 2024-02-01 DIAGNOSIS — N17.0 ACUTE KIDNEY FAILURE WITH TUBULAR NECROSIS: ICD-10-CM

## 2024-02-01 DIAGNOSIS — Z11.52 ENCOUNTER FOR SCREENING FOR COVID-19: ICD-10-CM

## 2024-02-01 DIAGNOSIS — Z95.5 PRESENCE OF CORONARY ANGIOPLASTY IMPLANT AND GRAFT: ICD-10-CM

## 2024-02-01 DIAGNOSIS — Z79.84 LONG TERM (CURRENT) USE OF ORAL HYPOGLYCEMIC DRUGS: ICD-10-CM

## 2024-02-01 DIAGNOSIS — I25.10 ATHEROSCLEROTIC HEART DISEASE OF NATIVE CORONARY ARTERY WITHOUT ANGINA PECTORIS: ICD-10-CM
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
## 2024-02-01 DIAGNOSIS — E04.2 NONTOXIC MULTINODULAR GOITER: ICD-10-CM

## 2024-02-01 DIAGNOSIS — D51.9 VITAMIN B12 DEFICIENCY ANEMIA, UNSPECIFIED: ICD-10-CM

## 2024-02-01 DIAGNOSIS — J69.0 PNEUMONITIS DUE TO INHALATION OF FOOD AND VOMIT: ICD-10-CM

## 2024-02-01 DIAGNOSIS — R74.01 ELEVATION OF LEVELS OF LIVER TRANSAMINASE LEVELS: ICD-10-CM
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
## 2024-02-01 DIAGNOSIS — A41.9 SEPSIS, UNSPECIFIED ORGANISM: ICD-10-CM

## 2024-02-01 DIAGNOSIS — Z78.1 PHYSICAL RESTRAINT STATUS: ICD-10-CM

## 2024-02-01 DIAGNOSIS — E78.5 HYPERLIPIDEMIA, UNSPECIFIED: ICD-10-CM

## 2024-02-01 DIAGNOSIS — Z66 DO NOT RESUSCITATE: ICD-10-CM

## 2024-02-01 DIAGNOSIS — R57.8 OTHER SHOCK: ICD-10-CM

## 2024-02-01 DIAGNOSIS — Z79.01 LONG TERM (CURRENT) USE OF ANTICOAGULANTS: ICD-10-CM

## 2024-02-01 DIAGNOSIS — N18.31 CHRONIC KIDNEY DISEASE, STAGE 3A: ICD-10-CM

## 2024-02-01 DIAGNOSIS — Z86.718 PERSONAL HISTORY OF OTHER VENOUS THROMBOSIS AND EMBOLISM: ICD-10-CM

## 2024-02-01 DIAGNOSIS — E11.22 TYPE 2 DIABETES MELLITUS WITH DIABETIC CHRONIC KIDNEY DISEASE: ICD-10-CM

## 2024-02-02 LAB
CULTURE RESULTS: SIGNIFICANT CHANGE UP
SPECIMEN SOURCE: SIGNIFICANT CHANGE UP

## 2024-08-28 NOTE — INITIAL ORGAN DONATION REFERRAL - NSLIVEONNYREPRESENTATIVENAME
-Negative depression screen  -GC chlamydia screen to be completed  - Vaccines updated          Salvatore Carlton